# Patient Record
Sex: MALE | Race: WHITE | NOT HISPANIC OR LATINO | Employment: OTHER | URBAN - NONMETROPOLITAN AREA
[De-identification: names, ages, dates, MRNs, and addresses within clinical notes are randomized per-mention and may not be internally consistent; named-entity substitution may affect disease eponyms.]

---

## 2017-03-06 RX ORDER — DOXYCYCLINE 100 MG/1
100 CAPSULE ORAL 2 TIMES DAILY
Qty: 20 CAPSULE | Refills: 0 | Status: SHIPPED | OUTPATIENT
Start: 2017-03-06 | End: 2017-05-22

## 2017-03-06 RX ORDER — DOXYCYCLINE 100 MG/1
100 CAPSULE ORAL 2 TIMES DAILY
Qty: 20 CAPSULE | Refills: 0 | Status: SHIPPED | OUTPATIENT
Start: 2017-03-06 | End: 2017-03-06 | Stop reason: SDUPTHER

## 2017-05-05 RX ORDER — LOVASTATIN 20 MG/1
20 TABLET ORAL NIGHTLY
Qty: 30 TABLET | Refills: 3 | Status: SHIPPED | OUTPATIENT
Start: 2017-05-05 | End: 2017-05-05 | Stop reason: SDUPTHER

## 2017-05-05 RX ORDER — LOVASTATIN 20 MG/1
20 TABLET ORAL NIGHTLY
Qty: 30 TABLET | Refills: 0 | Status: SHIPPED | OUTPATIENT
Start: 2017-05-05 | End: 2017-06-20

## 2017-05-22 ENCOUNTER — OFFICE VISIT (OUTPATIENT)
Dept: FAMILY MEDICINE CLINIC | Facility: CLINIC | Age: 57
End: 2017-05-22

## 2017-05-22 ENCOUNTER — TELEPHONE (OUTPATIENT)
Dept: FAMILY MEDICINE CLINIC | Facility: CLINIC | Age: 57
End: 2017-05-22

## 2017-05-22 VITALS
HEART RATE: 78 BPM | BODY MASS INDEX: 29.73 KG/M2 | SYSTOLIC BLOOD PRESSURE: 152 MMHG | HEIGHT: 66 IN | OXYGEN SATURATION: 97 % | DIASTOLIC BLOOD PRESSURE: 70 MMHG | WEIGHT: 185 LBS | RESPIRATION RATE: 16 BRPM | TEMPERATURE: 98.4 F

## 2017-05-22 DIAGNOSIS — E03.9 ACQUIRED HYPOTHYROIDISM: ICD-10-CM

## 2017-05-22 DIAGNOSIS — G60.0 CHARCOT-MARIE-TOOTH DISEASE: ICD-10-CM

## 2017-05-22 DIAGNOSIS — E78.00 HYPERCHOLESTEROLEMIA: ICD-10-CM

## 2017-05-22 DIAGNOSIS — R91.1 SOLITARY PULMONARY NODULE ON LUNG CT: Primary | ICD-10-CM

## 2017-05-22 PROCEDURE — 99396 PREV VISIT EST AGE 40-64: CPT | Performed by: INTERNAL MEDICINE

## 2017-05-22 PROCEDURE — 99214 OFFICE O/P EST MOD 30 MIN: CPT | Performed by: INTERNAL MEDICINE

## 2017-05-22 PROCEDURE — 96160 PT-FOCUSED HLTH RISK ASSMT: CPT | Performed by: INTERNAL MEDICINE

## 2017-05-22 PROCEDURE — G0439 PPPS, SUBSEQ VISIT: HCPCS | Performed by: INTERNAL MEDICINE

## 2017-05-23 DIAGNOSIS — E87.5 HYPERKALEMIA: Primary | ICD-10-CM

## 2017-05-23 LAB
ALBUMIN SERPL-MCNC: 4 G/DL (ref 3.5–5)
ALBUMIN/GLOB SERPL: 1.7 G/DL (ref 1–2)
ALP SERPL-CCNC: 121 U/L (ref 38–126)
ALT SERPL-CCNC: 57 U/L (ref 13–69)
AST SERPL-CCNC: 56 U/L (ref 15–46)
BASOPHILS # BLD AUTO: 0.07 10*3/MM3 (ref 0–0.2)
BASOPHILS NFR BLD AUTO: 1.1 % (ref 0–2.5)
BILIRUB SERPL-MCNC: 0.3 MG/DL (ref 0.2–1.3)
BUN SERPL-MCNC: 27 MG/DL (ref 7–20)
BUN/CREAT SERPL: 22.5 (ref 6.3–21.9)
CALCIUM SERPL-MCNC: 9.5 MG/DL (ref 8.4–10.2)
CHLORIDE SERPL-SCNC: 104 MMOL/L (ref 98–107)
CHOLEST SERPL-MCNC: 168 MG/DL (ref 0–199)
CO2 SERPL-SCNC: 25 MMOL/L (ref 26–30)
CREAT SERPL-MCNC: 1.2 MG/DL (ref 0.6–1.3)
EOSINOPHIL # BLD AUTO: 0.24 10*3/MM3 (ref 0–0.7)
EOSINOPHIL NFR BLD AUTO: 3.6 % (ref 0–7)
ERYTHROCYTE [DISTWIDTH] IN BLOOD BY AUTOMATED COUNT: 13.2 % (ref 11.5–14.5)
GLOBULIN SER CALC-MCNC: 2.4 GM/DL
GLUCOSE SERPL-MCNC: 92 MG/DL (ref 74–98)
HCT VFR BLD AUTO: 45.6 % (ref 42–52)
HDLC SERPL-MCNC: 45 MG/DL (ref 40–60)
HGB BLD-MCNC: 15.1 G/DL (ref 14–18)
IMM GRANULOCYTES # BLD: 0.01 10*3/MM3 (ref 0–0.06)
IMM GRANULOCYTES NFR BLD: 0.2 % (ref 0–0.6)
LDLC SERPL CALC-MCNC: 79 MG/DL (ref 0–99)
LYMPHOCYTES # BLD AUTO: 2.2 10*3/MM3 (ref 0.6–3.4)
LYMPHOCYTES NFR BLD AUTO: 33.1 % (ref 10–50)
MCH RBC QN AUTO: 31.2 PG (ref 27–31)
MCHC RBC AUTO-ENTMCNC: 33.1 G/DL (ref 30–37)
MCV RBC AUTO: 94.2 FL (ref 80–94)
MONOCYTES # BLD AUTO: 0.68 10*3/MM3 (ref 0–0.9)
MONOCYTES NFR BLD AUTO: 10.2 % (ref 0–12)
NEUTROPHILS # BLD AUTO: 3.45 10*3/MM3 (ref 2–6.9)
NEUTROPHILS NFR BLD AUTO: 51.8 % (ref 37–80)
NRBC BLD AUTO-RTO: 0 /100 WBC (ref 0–0)
PLATELET # BLD AUTO: 207 10*3/MM3 (ref 130–400)
POTASSIUM SERPL-SCNC: 5.7 MMOL/L (ref 3.5–5.1)
PROT SERPL-MCNC: 6.4 G/DL (ref 6.3–8.2)
RBC # BLD AUTO: 4.84 10*6/MM3 (ref 4.7–6.1)
SODIUM SERPL-SCNC: 138 MMOL/L (ref 137–145)
T3FREE SERPL-MCNC: 2.7 PG/ML (ref 2–4.4)
T4 FREE SERPL-MCNC: 1.56 NG/DL (ref 0.78–2.19)
TRIGL SERPL-MCNC: 219 MG/DL
TSH SERPL DL<=0.005 MIU/L-ACNC: 1 MIU/ML (ref 0.47–4.68)
VIT B12 SERPL-MCNC: 757 PG/ML (ref 239–931)
VLDLC SERPL CALC-MCNC: 43.8 MG/DL
WBC # BLD AUTO: 6.65 10*3/MM3 (ref 4.8–10.8)

## 2017-06-20 ENCOUNTER — OFFICE VISIT (OUTPATIENT)
Dept: CARDIOLOGY | Facility: CLINIC | Age: 57
End: 2017-06-20

## 2017-06-20 VITALS
SYSTOLIC BLOOD PRESSURE: 114 MMHG | DIASTOLIC BLOOD PRESSURE: 66 MMHG | HEART RATE: 76 BPM | HEIGHT: 66 IN | BODY MASS INDEX: 29.89 KG/M2 | WEIGHT: 186 LBS

## 2017-06-20 DIAGNOSIS — Z95.0 PACEMAKER: ICD-10-CM

## 2017-06-20 PROCEDURE — 93288 INTERROG EVL PM/LDLS PM IP: CPT | Performed by: INTERNAL MEDICINE

## 2017-06-20 NOTE — PROGRESS NOTES
"              Electrophysiology PM Check           Current Outpatient Prescriptions:   •  aspirin 81 MG tablet, Take  by mouth daily., Disp: , Rfl:   •  Cholecalciferol (VITAMIN D-3) 1000 UNITS capsule, Take  by mouth., Disp: , Rfl:   •  citalopram (CeleXA) 20 MG tablet, Take 1 tablet by mouth Daily., Disp: 90 tablet, Rfl: 3  •  Docusate Calcium (STOOL SOFTENER PO), Take 100 mg by mouth 3 (Three) Times a Day., Disp: , Rfl:   •  fexofenadine (ALLEGRA) 180 MG tablet, Take 180 mg by mouth Daily., Disp: , Rfl:   •  ipratropium-albuterol (DUO-NEB) 0.5-2.5 mg/mL nebulizer, Take 3 mL by nebulization As Needed (as needed)., Disp: 360 mL, Rfl: 3  •  levothyroxine (LEVOXYL) 100 MCG tablet, Take 1 tablet by mouth Daily., Disp: 90 tablet, Rfl: 3  •  morphine (MS CONTIN) 60 MG 12 hr tablet, Take  by mouth 2 (Two) Times a Day., Disp: , Rfl:   •  omeprazole (priLOSEC) 20 MG capsule, Take 1 capsule by mouth Daily., Disp: 90 capsule, Rfl: 3  •  oxyCODONE (ROXICODONE) 10 MG tablet, Take  by mouth Daily As Needed., Disp: , Rfl:   •  promethazine (PHENERGAN) 25 MG tablet, Take  by mouth Daily., Disp: , Rfl:   •  tiZANidine (ZANAFLEX) 4 MG tablet, Take 1 tablet by mouth As Needed for muscle spasms., Disp: 30 tablet, Rfl: 5     /66 (BP Location: Right arm, Patient Position: Sitting)  Pulse 76  Ht 66\" (167.6 cm)  Wt 186 lb (84.4 kg)  BMI 30.02 kg/m2.    Physical Exam   Pulmonary/Chest:              Company SJM  Mode DDDR  Lower Rate 70 bpm  Upper rate 130 bpm       Thresholds  % pacing 89  Atrial Pacing 0.5 Volts @ 0.5 ms  Atrial Sensing 4.7 mV  Atrial Impedence 400 Ohms    % pacing 2.3  Right Ventricular Pacing 0.75 Volts @ 0.5 ms  Right Ventricular Sensing 712 mV  Right Ventricular Impedence 510 Ohms      Battery Voltage 2.93 Volts  Longevity 7.2-8.1        Episodes 0    Reprogramming 0                           Comments       NORMAL FUNCTION      "

## 2017-09-08 RX ORDER — LOVASTATIN 20 MG/1
20 TABLET ORAL NIGHTLY
Qty: 90 TABLET | Refills: 3 | Status: SHIPPED | OUTPATIENT
Start: 2017-09-08 | End: 2018-09-14 | Stop reason: SDUPTHER

## 2017-10-03 ENCOUNTER — CLINICAL SUPPORT NO REQUIREMENTS (OUTPATIENT)
Dept: CARDIOLOGY | Facility: CLINIC | Age: 57
End: 2017-10-03

## 2017-10-03 DIAGNOSIS — R00.1 BRADYCARDIA: ICD-10-CM

## 2017-10-03 PROCEDURE — 93294 REM INTERROG EVL PM/LDLS PM: CPT | Performed by: PHYSICIAN ASSISTANT

## 2017-10-03 PROCEDURE — 93296 REM INTERROG EVL PM/IDS: CPT | Performed by: PHYSICIAN ASSISTANT

## 2017-10-19 ENCOUNTER — HOSPITAL ENCOUNTER (OUTPATIENT)
Dept: CT IMAGING | Facility: HOSPITAL | Age: 57
Discharge: HOME | End: 2017-10-19
Attending: INTERNAL MEDICINE | Admitting: INTERNAL MEDICINE

## 2017-10-19 DIAGNOSIS — R91.1 SOLITARY PULMONARY NODULE ON LUNG CT: ICD-10-CM

## 2017-10-19 PROCEDURE — 71250 CT THORAX DX C-: CPT

## 2017-10-30 ENCOUNTER — OFFICE VISIT (OUTPATIENT)
Dept: FAMILY MEDICINE CLINIC | Facility: CLINIC | Age: 57
End: 2017-10-30

## 2017-10-30 VITALS
DIASTOLIC BLOOD PRESSURE: 80 MMHG | WEIGHT: 191.12 LBS | RESPIRATION RATE: 18 BRPM | OXYGEN SATURATION: 99 % | HEART RATE: 87 BPM | HEIGHT: 66 IN | SYSTOLIC BLOOD PRESSURE: 163 MMHG | TEMPERATURE: 98.5 F | BODY MASS INDEX: 30.71 KG/M2

## 2017-10-30 DIAGNOSIS — Z23 NEED FOR IMMUNIZATION AGAINST INFLUENZA: ICD-10-CM

## 2017-10-30 DIAGNOSIS — G47.33 OSA ON CPAP: ICD-10-CM

## 2017-10-30 DIAGNOSIS — E78.00 HYPERCHOLESTEROLEMIA: Primary | ICD-10-CM

## 2017-10-30 DIAGNOSIS — Z99.89 OSA ON CPAP: ICD-10-CM

## 2017-10-30 DIAGNOSIS — R91.1 SOLITARY PULMONARY NODULE ON LUNG CT: ICD-10-CM

## 2017-10-30 DIAGNOSIS — Z72.0 TOBACCO ABUSE: ICD-10-CM

## 2017-10-30 DIAGNOSIS — E03.9 ACQUIRED HYPOTHYROIDISM: ICD-10-CM

## 2017-10-30 PROCEDURE — 99213 OFFICE O/P EST LOW 20 MIN: CPT | Performed by: INTERNAL MEDICINE

## 2017-10-30 PROCEDURE — 90686 IIV4 VACC NO PRSV 0.5 ML IM: CPT | Performed by: INTERNAL MEDICINE

## 2017-10-30 PROCEDURE — 90471 IMMUNIZATION ADMIN: CPT | Performed by: INTERNAL MEDICINE

## 2017-10-30 RX ORDER — MORPHINE SULFATE 30 MG/1
30 TABLET, FILM COATED, EXTENDED RELEASE ORAL 2 TIMES DAILY
Refills: 0 | COMMUNITY
Start: 2017-10-23

## 2017-10-30 NOTE — PROGRESS NOTES
Subjective     Patient ID: Terry Mcguire is a 57 y.o. male. Patient is here for management of multiple medical problems.     Chief Complaint   Patient presents with   • Follow-up     Follow up on CT on chest      History of Present Illness     cpap waking pt ever hour.  Pt just waking up.  No smothering.   Increase sweating with c pap.  Nose stops up with cpap. Asking for options.        Recent ct.   Nodule reviewed. No nodule.              The following portions of the patient's history were reviewed and updated as appropriate: allergies, current medications, past family history, past medical history, past social history, past surgical history and problem list.    Review of Systems   Constitutional: Positive for fatigue.   Respiratory: Positive for shortness of breath. Negative for wheezing.    Psychiatric/Behavioral: Positive for sleep disturbance.       Current Outpatient Prescriptions:   •  aspirin 81 MG tablet, Take  by mouth daily., Disp: , Rfl:   •  citalopram (CeleXA) 20 MG tablet, Take 1 tablet by mouth Daily., Disp: 90 tablet, Rfl: 3  •  Docusate Calcium (STOOL SOFTENER PO), Take 100 mg by mouth 3 (Three) Times a Day., Disp: , Rfl:   •  fexofenadine (ALLEGRA) 180 MG tablet, Take 180 mg by mouth Daily., Disp: , Rfl:   •  ipratropium-albuterol (DUO-NEB) 0.5-2.5 mg/mL nebulizer, Take 3 mL by nebulization As Needed (as needed)., Disp: 360 mL, Rfl: 3  •  levothyroxine (LEVOXYL) 100 MCG tablet, Take 1 tablet by mouth Daily., Disp: 90 tablet, Rfl: 3  •  lovastatin (MEVACOR) 20 MG tablet, Take 1 tablet by mouth Every Night., Disp: 90 tablet, Rfl: 3  •  omeprazole (priLOSEC) 20 MG capsule, Take 1 capsule by mouth Daily., Disp: 90 capsule, Rfl: 3  •  oxyCODONE (ROXICODONE) 10 MG tablet, Take  by mouth Daily As Needed., Disp: , Rfl:   •  promethazine (PHENERGAN) 25 MG tablet, Take  by mouth Daily., Disp: , Rfl:   •  tiZANidine (ZANAFLEX) 4 MG tablet, Take 1 tablet by mouth As Needed for muscle spasms., Disp: 30  "tablet, Rfl: 5  •  Cholecalciferol (VITAMIN D-3) 1000 UNITS capsule, Take  by mouth., Disp: , Rfl:   •  Morphine (MS CONTIN) 30 MG 12 hr tablet, take 1 tablet by mouth three times a day, Disp: , Rfl: 0    Objective      Blood pressure 163/80, pulse 87, temperature 98.5 °F (36.9 °C), temperature source Oral, resp. rate 18, height 66\" (167.6 cm), weight 191 lb 1.9 oz (86.7 kg), SpO2 99 %.    Physical Exam     General Appearance:    Alert, cooperative, no distress, appears stated age   Head:    Normocephalic, without obvious abnormality, atraumatic   Eyes:    PERRL, conjunctiva/corneas clear, EOM's intact   Ears:    Normal TM's and external ear canals, both ears   Nose:   Nares normal, septum midline, mucosa normal, no drainage   or sinus tenderness   Throat:   Lips, mucosa, and tongue normal; teeth and gums normal   Neck:   Supple, symmetrical, trachea midline, no adenopathy;        thyroid:  No enlargement/tenderness/nodules; no carotid    bruit or JVD   Back:     Symmetric, no curvature, ROM normal, no CVA tenderness   Lungs:     Clear to auscultation bilaterally, respirations unlabored   Chest wall:    No tenderness or deformity   Heart:    Regular rate and rhythm, S1 and S2 normal, no murmur,        rub or gallop   Abdomen:     Soft, non-tender, bowel sounds active all four quadrants,     no masses, no organomegaly   Extremities:   Extremities normal, atraumatic, no cyanosis or edema   Pulses:   2+ and symmetric all extremities   Skin:   Skin color, texture, turgor normal, no rashes or lesions   Lymph nodes:   Cervical, supraclavicular, and axillary nodes normal   Neurologic:   CNII-XII intact. Normal strength, sensation and reflexes       throughout      Results for orders placed or performed in visit on 05/22/17   TSH   Result Value Ref Range    TSH 1.000 0.470 - 4.680 mIU/mL   T4, Free   Result Value Ref Range    Free T4 1.56 0.78 - 2.19 ng/dL   T3, Free   Result Value Ref Range    T3, Free 2.7 2.0 - 4.4 pg/mL "   Vitamin B12   Result Value Ref Range    Vitamin B-12 757 239 - 931 pg/mL   Comprehensive Metabolic Panel   Result Value Ref Range    Glucose 92 74 - 98 mg/dL    BUN 27 (H) 7 - 20 mg/dL    Creatinine 1.20 0.60 - 1.30 mg/dL    eGFR Non African Am 63 >60 mL/min/1.73    eGFR African Am 76 >60 mL/min/1.73    BUN/Creatinine Ratio 22.5 (H) 6.3 - 21.9    Sodium 138 137 - 145 mmol/L    Potassium 5.7 (C) 3.5 - 5.1 mmol/L    Chloride 104 98 - 107 mmol/L    Total CO2 25.0 (L) 26.0 - 30.0 mmol/L    Calcium 9.5 8.4 - 10.2 mg/dL    Total Protein 6.4 6.3 - 8.2 g/dL    Albumin 4.00 3.50 - 5.00 g/dL    Globulin 2.4 gm/dL    A/G Ratio 1.7 1.0 - 2.0 g/dL    Total Bilirubin 0.3 0.2 - 1.3 mg/dL    Alkaline Phosphatase 121 38 - 126 U/L    AST (SGOT) 56 (H) 15 - 46 U/L    ALT (SGPT) 57 13 - 69 U/L   Lipid Panel   Result Value Ref Range    Total Cholesterol 168 0 - 199 mg/dL    Triglycerides 219 (H) <150 mg/dL    HDL Cholesterol 45 40 - 60 mg/dL    VLDL Cholesterol 43.8 mg/dL    LDL Cholesterol  79 0 - 99 mg/dL   CBC & Differential   Result Value Ref Range    WBC 6.65 4.80 - 10.80 10*3/mm3    RBC 4.84 4.70 - 6.10 10*6/mm3    Hemoglobin 15.1 14.0 - 18.0 g/dL    Hematocrit 45.6 42.0 - 52.0 %    MCV 94.2 (H) 80.0 - 94.0 fL    MCH 31.2 (H) 27.0 - 31.0 pg    MCHC 33.1 30.0 - 37.0 g/dL    RDW 13.2 11.5 - 14.5 %    Platelets 207 130 - 400 10*3/mm3    Neutrophil Rel % 51.8 37.0 - 80.0 %    Lymphocyte Rel % 33.1 10.0 - 50.0 %    Monocyte Rel % 10.2 0.0 - 12.0 %    Eosinophil Rel % 3.6 0.0 - 7.0 %    Basophil Rel % 1.1 0.0 - 2.5 %    Neutrophils Absolute 3.45 2.00 - 6.90 10*3/mm3    Lymphocytes Absolute 2.20 0.60 - 3.40 10*3/mm3    Monocytes Absolute 0.68 0.00 - 0.90 10*3/mm3    Eosinophils Absolute 0.24 0.00 - 0.70 10*3/mm3    Basophils Absolute 0.07 0.00 - 0.20 10*3/mm3    Immature Granulocyte Rel % 0.2 0.0 - 0.6 %    Immature Grans Absolute 0.01 0.00 - 0.06 10*3/mm3    nRBC 0.0 0.0 - 0.0 /100 WBC         Assessment/Plan     Pt on oxycodone and  morphine.  Pt encouraged to use cpap every night given his pain meds and risk of hypoxia at night.    Ct neg of nodule.           Terry was seen today for follow-up.    Diagnoses and all orders for this visit:    Hypercholesterolemia  -     TSH  -     T4, Free  -     Vitamin B12  -     Comprehensive Metabolic Panel  -     CBC & Differential  -     Lipid Panel    Acquired hypothyroidism  -     TSH  -     T4, Free  -     Vitamin B12  -     Comprehensive Metabolic Panel  -     CBC & Differential  -     Lipid Panel    Tobacco abuse  -     TSH  -     T4, Free  -     Vitamin B12  -     Comprehensive Metabolic Panel  -     CBC & Differential  -     Lipid Panel    NETTIE on CPAP  -     TSH  -     T4, Free  -     Vitamin B12  -     Comprehensive Metabolic Panel  -     CBC & Differential  -     Lipid Panel    Solitary pulmonary nodule on lung CT      Return in about 6 months (around 4/30/2018).          There are no Patient Instructions on file for this visit.     Segundo Vo MD    Assessment/Plan           Terry was seen today for follow-up.    Diagnoses and all orders for this visit:    Hypercholesterolemia  -     TSH  -     T4, Free  -     Vitamin B12  -     Comprehensive Metabolic Panel  -     CBC & Differential  -     Lipid Panel    Acquired hypothyroidism  -     TSH  -     T4, Free  -     Vitamin B12  -     Comprehensive Metabolic Panel  -     CBC & Differential  -     Lipid Panel    Tobacco abuse  -     TSH  -     T4, Free  -     Vitamin B12  -     Comprehensive Metabolic Panel  -     CBC & Differential  -     Lipid Panel    NETTIE on CPAP  -     TSH  -     T4, Free  -     Vitamin B12  -     Comprehensive Metabolic Panel  -     CBC & Differential  -     Lipid Panel    Solitary pulmonary nodule on lung CT      Return in about 6 months (around 4/30/2018).                   There are no Patient Instructions on file for this visit.

## 2017-11-16 RX ORDER — OMEPRAZOLE 20 MG/1
CAPSULE, DELAYED RELEASE ORAL
Qty: 90 CAPSULE | Refills: 3 | Status: SHIPPED | OUTPATIENT
Start: 2017-11-16 | End: 2018-12-04 | Stop reason: SDUPTHER

## 2017-11-16 RX ORDER — LEVOTHYROXINE SODIUM 0.1 MG/1
TABLET ORAL
Qty: 90 TABLET | Refills: 3 | Status: SHIPPED | OUTPATIENT
Start: 2017-11-16 | End: 2018-12-04 | Stop reason: SDUPTHER

## 2017-11-16 RX ORDER — TIZANIDINE 4 MG/1
TABLET ORAL
Qty: 270 TABLET | Refills: 3 | Status: SHIPPED | OUTPATIENT
Start: 2017-11-16 | End: 2018-07-12

## 2018-02-01 RX ORDER — CITALOPRAM 20 MG/1
TABLET ORAL
Qty: 90 TABLET | Refills: 3 | Status: SHIPPED | OUTPATIENT
Start: 2018-02-01 | End: 2019-02-16 | Stop reason: SDUPTHER

## 2018-02-06 ENCOUNTER — CLINICAL SUPPORT NO REQUIREMENTS (OUTPATIENT)
Dept: CARDIOLOGY | Facility: CLINIC | Age: 58
End: 2018-02-06

## 2018-02-06 DIAGNOSIS — I63.9 CEREBROVASCULAR ACCIDENT (CVA), UNSPECIFIED MECHANISM (HCC): ICD-10-CM

## 2018-02-06 DIAGNOSIS — I49.5 SICK SINUS SYNDROME (HCC): Primary | ICD-10-CM

## 2018-03-08 ENCOUNTER — OFFICE VISIT (OUTPATIENT)
Dept: CARDIOLOGY | Facility: CLINIC | Age: 58
End: 2018-03-08

## 2018-03-08 VITALS
HEIGHT: 66 IN | HEART RATE: 49 BPM | WEIGHT: 201 LBS | BODY MASS INDEX: 32.3 KG/M2 | DIASTOLIC BLOOD PRESSURE: 52 MMHG | SYSTOLIC BLOOD PRESSURE: 106 MMHG

## 2018-03-08 DIAGNOSIS — I63.9 CEREBROVASCULAR ACCIDENT (CVA), UNSPECIFIED MECHANISM (HCC): ICD-10-CM

## 2018-03-08 DIAGNOSIS — E78.5 DYSLIPIDEMIA: ICD-10-CM

## 2018-03-08 DIAGNOSIS — G47.33 OSA ON CPAP: ICD-10-CM

## 2018-03-08 DIAGNOSIS — J43.1 PANLOBULAR EMPHYSEMA (HCC): ICD-10-CM

## 2018-03-08 DIAGNOSIS — R94.31 ABNORMAL ELECTROCARDIOGRAM: ICD-10-CM

## 2018-03-08 DIAGNOSIS — I49.5 SICK SINUS SYNDROME (HCC): Primary | ICD-10-CM

## 2018-03-08 DIAGNOSIS — Z72.0 TOBACCO ABUSE: ICD-10-CM

## 2018-03-08 DIAGNOSIS — I10 BENIGN HYPERTENSION: ICD-10-CM

## 2018-03-08 DIAGNOSIS — G60.0 HEREDITARY SENSORIMOTOR NEUROPATHY: ICD-10-CM

## 2018-03-08 DIAGNOSIS — Z99.89 OSA ON CPAP: ICD-10-CM

## 2018-03-08 DIAGNOSIS — R00.1 BRADYCARDIA: ICD-10-CM

## 2018-03-08 PROCEDURE — 99214 OFFICE O/P EST MOD 30 MIN: CPT | Performed by: INTERNAL MEDICINE

## 2018-03-08 PROCEDURE — 93280 PM DEVICE PROGR EVAL DUAL: CPT | Performed by: INTERNAL MEDICINE

## 2018-03-08 RX ORDER — GABAPENTIN 300 MG/1
300 CAPSULE ORAL 3 TIMES DAILY
COMMUNITY
End: 2018-07-10 | Stop reason: SDDI

## 2018-03-08 RX ORDER — BISOPROLOL FUMARATE 5 MG/1
2.5 TABLET, FILM COATED ORAL DAILY
Qty: 45 TABLET | Refills: 4 | Status: SHIPPED | OUTPATIENT
Start: 2018-03-08 | End: 2018-07-12

## 2018-03-08 NOTE — PROGRESS NOTES
Subjective:     Encounter Date:03/08/2018 - [place] Office    Patient ID: Terry Mcguire is a 57 y.o.  white male, retired/disabled , and resident of [place], [place].    PHYSICIAN:  Segundo Vo MD  [facility] CARDIOLOGIST:  Segundo Duran MD, [facility]  NEUROLOGIST:  Shukri Taylor MD  PAIN SPECIALIST:  [facility]    Chief Complaint:   Chief Complaint   Patient presents with   • Slow Heart Rate     Problem List:  1. Symptomatic bradycardia:  a. Status post [employer] pacemaker implantation in 2006.  b. Subsequent [employer] Bishop Accent DR/RF 2210 pacemaker implant, 05/22/2013  c. Abnormal interrogation with intermittent atrial tachycardias and nonsustained ventricular tachycardia, March 2018  2. Benign hypertension.  3. Hypercholesterolemia.  4.  Chest pain syndrome:  a. Abnormal Cardiolite, 09/26/2006, with inferior  wall defect.  b. Normal cardiac catheterization, 10/06/2006; normal EF.  5. Ongoing tobacco abuse with chronic obstructive pulmonary disease (1.5 PPD)  6. Obstructive sleep apnea, with CPAP.  7. Seasonal allergies/rhinitis.  8. Osteoarthritis with chronic pain issues.  9. Gastroesophageal reflux disease with hiatal hernia.  10. Hypothyroidism  on chronic supplementation.  11. Essential tremors.  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  13. Mild obesity, BMI 32.4  14. Family history of hypertension.  15. Surgical history:  a. Pacemaker implantation.  b. Left ankle surgery.  c. Multiple colonoscopies.  d. Herniorrhaphy.  e. Bilateral elbow surgery.  f. Bilateral carpal tunnel release.    No Known Allergies      Current Outpatient Prescriptions:   •  aspirin 81 MG tablet, Take  by mouth daily., Disp: , Rfl:   •  citalopram (CeleXA) 20 MG tablet, TAKE 1 TABLET BY MOUTH DAILY., Disp: 90 tablet, Rfl: 3  •  Docusate Calcium (STOOL SOFTENER PO), Take 100 mg by mouth 3 (Three) Times a Day., Disp: , Rfl:   •  fexofenadine (ALLEGRA) 180 MG tablet, Take  180 mg by mouth Daily., Disp: , Rfl:   •  gabapentin (NEURONTIN) 300 MG capsule, Take 300 mg by mouth 3 (Three) Times a Day., Disp: , Rfl:   •  levothyroxine (SYNTHROID, LEVOTHROID) 100 MCG tablet, TAKE 1 TABLET EVERY DAY, Disp: 90 tablet, Rfl: 3  •  lovastatin (MEVACOR) 20 MG tablet, Take 1 tablet by mouth Every Night., Disp: 90 tablet, Rfl: 3  •  Morphine (MS CONTIN) 30 MG 12 hr tablet, take 1 tablet by mouth three times a day, Disp: , Rfl: 0  •  omeprazole (priLOSEC) 20 MG capsule, TAKE 1 CAPSULE BY MOUTH DAILY., Disp: 90 capsule, Rfl: 3  •  oxyCODONE (ROXICODONE) 10 MG tablet, Take  by mouth Daily As Needed., Disp: , Rfl:   •  promethazine (PHENERGAN) 25 MG tablet, Take  by mouth Daily., Disp: , Rfl:   •  tiZANidine (ZANAFLEX) 4 MG tablet, TAKE 1 TABLET THREE TIMES DAILY AS NEEDED, Disp: 270 tablet, Rfl: 3    HISTORY OF PRESENT ILLNESS: Patient returns for followup after a 1-1/2 year hiatus.  He is a former patient of Dr. Ya. He has been retired since 2002 due to damaged nerves in his hands that prevented him from feeling anything.  He takes care of his dogs and does yard work and a little bit of house work.  He has shoveled snow this winter, but he does not do it all at one time.  He used to live in [place].  He does not have any chest pain, tightness, or pressure.  His lower back bothers him when he is out in the cold but not his chest.  He sleeps well at night as long as he uses his CPAP.  He has had lab work drawn, both at his physician's office and at his pain medicine specialist's office, and this has been good; data deficit.  He does complain of his feet and legs being red and almost purple when he gets up in the morning; he takes gabapentin for his leg pain.  He smokes on a daily basis, about a pack and a half per day.  He states that he and his wife have plans to quit.  The use of Chantix is discussed with him; he has never tried Chantix in the past.  Patient otherwise denies chest pain, shortness  "of breath, PND, edema, palpitations, syncope or presyncope at this time on limited activity.  No lower extremity claudication or focal motor-sensory changes.        Review of Systems   Cardiovascular: Positive for irregular heartbeat.   Endocrine:        When patient gets up in the morning, his legs are red, and his feet look purple.      Obtained and otherwise negative except as outlined in problem list and HPI.    Procedures [employer] [id number]  90% right atrial paced, 1.5% RV paced.  P wave 4.3, R wave >12.0.  Nominal atrial and ventricular threshold and impedance.  Battery voltage 2.93 volts.  Expected longevity 8 years.  14 episodes of atrial tachycardia with 1 nonsustained ventricular tachycardia consisting of 14 beats at a rate of 160/minute.  Reprogrammed with increased sensor threshold to -0.5, all manual tests completed.  Abnormal interrogation.       Objective:       Vitals:    03/08/18 1330 03/08/18 1331   BP: 102/58 106/52   BP Location: Right arm Right arm   Patient Position: Sitting Standing   Pulse: (!) 49    Weight: 91.2 kg (201 lb)    Height: 167.6 cm (66\")      Body mass index is 32.44 kg/(m^2).   Last weight:  187 lbs.    Physical Exam   Constitutional: He is oriented to person, place, and time. He appears well-developed and well-nourished.   Neck: No JVD present. Carotid bruit is not present. No thyromegaly present.   Cardiovascular: Regular rhythm, S1 normal and S2 normal.  Exam reveals no gallop, no S3 and no friction rub.    Murmur heard.   Medium-pitched early systolic murmur is present with a grade of 2/6  at the lower left sternal border  Pulses:       Carotid pulses are 1+ on the right side, and 1+ on the left side.       Radial pulses are 1+ on the right side, and 1+ on the left side.        Femoral pulses are 1+ on the right side, and 1+ on the left side.       Popliteal pulses are 1+ on the right side, and 1+ on the left side.        Dorsalis pedis pulses are 1+ on the right side, and 1+ " on the left side.        Posterior tibial pulses are 1+ on the right side, and 1+ on the left side.   Pulmonary/Chest: Effort normal. He has decreased breath sounds. He has no wheezes. He has no rhonchi. He has no rales.   Left precordial pacemaker site is nominal   Abdominal: Soft. He exhibits no mass. There is no hepatosplenomegaly. There is no tenderness. There is no guarding.   Bowel sounds audible x4   Musculoskeletal: Normal range of motion. He exhibits no edema.   Lymphadenopathy:     He has no cervical adenopathy.   Neurological: He is alert and oriented to person, place, and time.   Skin: Skin is warm, dry and intact. No rash noted.   Strong tobacco smell   Vitals reviewed.        Lab Review:   Lab Results   Component Value Date    BUN 27 (H) 05/22/2017    CREATININE 1.20 05/22/2017    EGFRIFNONA 63 05/22/2017    EGFRIFAFRI 76 05/22/2017    BCR 22.5 (H) 05/22/2017    CO2 25.0 (L) 05/22/2017    CALCIUM 9.5 05/22/2017    PROTENTOTREF 6.4 05/22/2017    ALBUMIN 4.00 05/22/2017    LABIL2 1.7 05/22/2017    AST 56 (H) 05/22/2017    ALT 57 05/22/2017       Lab Results   Component Value Date    WBC 6.65 05/22/2017    HGB 15.1 05/22/2017    HCT 45.6 05/22/2017    MCV 94.2 (H) 05/22/2017     05/22/2017       Lab Results   Component Value Date    TSH 1.000 05/22/2017     Lab Results   Component Value Date    TRIG 219 (H) 05/22/2017     Lab Results   Component Value Date    HDL 45 05/22/2017     Lab Results   Component Value Date    LDL 79 05/22/2017           Assessment:   Overall continued acceptable course with no interim cardiopulmonary complaints with acceptable functional status. We will defer additional diagnostic or therapeutic intervention from a cardiac perspective at this time other than to screen him for development of LV dysfunction and/or myocardial ischemia/scar with his continued tobacco use.  He states he will attempt to obtain and fax laboratory results to us.  I have had a long discussion with  him about the immediate and chronic need to not use tobacco.       Diagnosis Plan   1. Sick sinus syndrome  Stress Test With Myocardial Perfusion (1 Day)   2. Benign hypertension  Stress Test With Myocardial Perfusion (1 Day)   3. Bradycardia  Stress Test With Myocardial Perfusion (1 Day)   4. Abnormal electrocardiogram   Stress Test With Myocardial Perfusion (1 Day)   5. Cerebrovascular accident (CVA), unspecified mechanism  Continue current treatment   6. Panlobular emphysema  Tobacco cessation stressed   7. NETTIE on CPAP  Compliance strongly encouraged   8. Hereditary sensorimotor neuropathy  Continue followup and monitoring with Dr. Taylor   9. Tobacco abuse  Chantix prescribed   10. Dyslipidemia  No data to review          Plan:         1. Patient to continue current medications and close follow up with the above providers with the following alterations:   A. Bisoprolol 2.5 mg daily   B. Chantix   2. Tentative cardiology follow up in July 2018, or patient may return sooner PRN.  3. The patient will return in the next few weeks, as schedule tolerates, for IV Lexiscan Cardiolite study  4. 0-325 card is provided with fax number so patient can share his laboratory results.    Transcribed by Silvana David for Dr. Noam Foster at 8:12 AM on 03/08/2018      INoam MD, [facility], personally performed the services described in this documentation as scribed by the above named individual in my presence, and it is both accurate and complete. At 1:49 PM on 03/08/2018

## 2018-03-15 ENCOUNTER — TELEPHONE (OUTPATIENT)
Dept: CARDIOLOGY | Facility: CLINIC | Age: 58
End: 2018-03-15

## 2018-03-15 NOTE — TELEPHONE ENCOUNTER
Patient called inquiring where his refills were sent last week. I attempted to call him back to let him know they were sent to [facility]. There was no answer and he had no VM. I also forwarded his info to scheduling as he wondered when he would be scheduled for his stress test.

## 2018-03-20 RX ORDER — VARENICLINE TARTRATE 1 MG/1
1 TABLET, FILM COATED ORAL DAILY
Qty: 30 TABLET | Refills: 6 | Status: SHIPPED | OUTPATIENT
Start: 2018-03-20 | End: 2018-07-10 | Stop reason: SDDI

## 2018-03-26 ENCOUNTER — HOSPITAL ENCOUNTER (OUTPATIENT)
Dept: CARDIOLOGY | Facility: HOSPITAL | Age: 58
Discharge: HOME | End: 2018-03-26
Attending: INTERNAL MEDICINE

## 2018-03-26 VITALS
HEART RATE: 70 BPM | WEIGHT: 201 LBS | BODY MASS INDEX: 32.3 KG/M2 | DIASTOLIC BLOOD PRESSURE: 80 MMHG | SYSTOLIC BLOOD PRESSURE: 132 MMHG | HEIGHT: 66 IN

## 2018-03-26 DIAGNOSIS — I10 BENIGN HYPERTENSION: ICD-10-CM

## 2018-03-26 DIAGNOSIS — R00.1 BRADYCARDIA: ICD-10-CM

## 2018-03-26 DIAGNOSIS — R94.31 ABNORMAL ELECTROCARDIOGRAM: ICD-10-CM

## 2018-03-26 DIAGNOSIS — I49.5 SICK SINUS SYNDROME (HCC): ICD-10-CM

## 2018-03-26 LAB
BH CV NUCLEAR PRIOR STUDY: 3
BH CV STRESS BP STAGE 2: NORMAL
BH CV STRESS BP STAGE 3: NORMAL
BH CV STRESS COMMENTS STAGE 1: NORMAL
BH CV STRESS DOSE REGADENOSON STAGE 1: 0.4
BH CV STRESS DURATION MIN STAGE 1: 1
BH CV STRESS DURATION MIN STAGE 2: 1
BH CV STRESS DURATION MIN STAGE 3: 1
BH CV STRESS DURATION MIN STAGE 4: 1
BH CV STRESS DURATION SEC STAGE 2: 0
BH CV STRESS HR STAGE 1: 72
BH CV STRESS HR STAGE 2: 85
BH CV STRESS HR STAGE 3: 82
BH CV STRESS HR STAGE 4: 78
BH CV STRESS PROTOCOL 1: NORMAL
BH CV STRESS RECOVERY BP: NORMAL MMHG
BH CV STRESS RECOVERY HR: 75 BPM
BH CV STRESS STAGE 1: 1
BH CV STRESS STAGE 2: 2
BH CV STRESS STAGE 3: 3
BH CV STRESS STAGE 4: 4
LV EF NUC BP: 77 %
MAXIMAL PREDICTED HEART RATE: 163 BPM
PERCENT MAX PREDICTED HR: 52.76 %
STRESS BASELINE BP: NORMAL MMHG
STRESS BASELINE HR: 70 BPM
STRESS PERCENT HR: 62 %
STRESS POST EXERCISE DUR MIN: 4 MIN
STRESS POST EXERCISE DUR SEC: 0 SEC
STRESS POST PEAK BP: NORMAL MMHG
STRESS POST PEAK HR: 86 BPM
STRESS TARGET HR: 139 BPM

## 2018-03-26 PROCEDURE — 25010000002 AMINOPHYLLINE PER 250 MG: Performed by: INTERNAL MEDICINE

## 2018-03-26 PROCEDURE — 0 TECHNETIUM SESTAMIBI: Performed by: INTERNAL MEDICINE

## 2018-03-26 PROCEDURE — 78452 HT MUSCLE IMAGE SPECT MULT: CPT | Performed by: INTERNAL MEDICINE

## 2018-03-26 PROCEDURE — 78452 HT MUSCLE IMAGE SPECT MULT: CPT

## 2018-03-26 PROCEDURE — 93017 CV STRESS TEST TRACING ONLY: CPT

## 2018-03-26 PROCEDURE — 25010000002 REGADENOSON 0.4 MG/5ML SOLUTION: Performed by: INTERNAL MEDICINE

## 2018-03-26 PROCEDURE — A9500 TC99M SESTAMIBI: HCPCS | Performed by: INTERNAL MEDICINE

## 2018-03-26 PROCEDURE — 93018 CV STRESS TEST I&R ONLY: CPT | Performed by: INTERNAL MEDICINE

## 2018-03-26 RX ORDER — AMINOPHYLLINE DIHYDRATE 25 MG/ML
25 INJECTION, SOLUTION INTRAVENOUS ONCE
Status: COMPLETED | OUTPATIENT
Start: 2018-03-26 | End: 2018-03-26

## 2018-03-26 RX ADMIN — REGADENOSON 0.4 MG: 0.08 INJECTION, SOLUTION INTRAVENOUS at 11:58

## 2018-03-26 RX ADMIN — TECHNETIUM TC 99M SESTAMIBI 1 DOSE: 1 INJECTION INTRAVENOUS at 10:05

## 2018-03-26 RX ADMIN — TECHNETIUM TC 99M SESTAMIBI 1 DOSE: 1 INJECTION INTRAVENOUS at 12:05

## 2018-03-26 RX ADMIN — AMINOPHYLLINE 25 MG: 25 INJECTION, SOLUTION INTRAVENOUS at 12:28

## 2018-04-10 ENCOUNTER — OFFICE VISIT (OUTPATIENT)
Dept: INTERNAL MEDICINE | Facility: CLINIC | Age: 58
End: 2018-04-10

## 2018-04-10 VITALS
BODY MASS INDEX: 31.98 KG/M2 | SYSTOLIC BLOOD PRESSURE: 138 MMHG | DIASTOLIC BLOOD PRESSURE: 78 MMHG | HEIGHT: 66 IN | OXYGEN SATURATION: 99 % | HEART RATE: 70 BPM | RESPIRATION RATE: 16 BRPM | TEMPERATURE: 98.2 F | WEIGHT: 199 LBS

## 2018-04-10 DIAGNOSIS — M25.551 HIP PAIN, BILATERAL: ICD-10-CM

## 2018-04-10 DIAGNOSIS — E55.9 VITAMIN D DEFICIENCY: ICD-10-CM

## 2018-04-10 DIAGNOSIS — G89.29 CHRONIC BILATERAL LOW BACK PAIN WITHOUT SCIATICA: ICD-10-CM

## 2018-04-10 DIAGNOSIS — E03.9 ACQUIRED HYPOTHYROIDISM: ICD-10-CM

## 2018-04-10 DIAGNOSIS — E78.00 HYPERCHOLESTEROLEMIA: Primary | ICD-10-CM

## 2018-04-10 DIAGNOSIS — M54.50 CHRONIC BILATERAL LOW BACK PAIN WITHOUT SCIATICA: ICD-10-CM

## 2018-04-10 DIAGNOSIS — I10 BENIGN HYPERTENSION: ICD-10-CM

## 2018-04-10 DIAGNOSIS — M25.552 HIP PAIN, BILATERAL: ICD-10-CM

## 2018-04-10 PROCEDURE — 99214 OFFICE O/P EST MOD 30 MIN: CPT | Performed by: INTERNAL MEDICINE

## 2018-04-10 NOTE — PROGRESS NOTES
Subjective     Patient ID: Terry Mcguire is a 57 y.o. male. Patient is here for management of multiple medical problems.     Chief Complaint   Patient presents with   • Hyperlipidemia     6 month follow-up   • Pain     patient having pain in lower back and both hips x 2 weeks     History of Present Illness     Back and hip pain all the time for many years with acute worsening over the past 2 weeks. Woke with pain 2 weeks ago. No trauma no lifting.   Take pain meds from ptc.   Pain is getting some better. Pt can now stand.    Lying in bed helps.  No fever chills or ab pain. Eating peanuts now and now not constipated.  Both hips hurting.    Pt was ask to come in for regular visit today. He didn't get labs done yet.              The following portions of the patient's history were reviewed and updated as appropriate: allergies, current medications, past family history, past medical history, past social history, past surgical history and problem list.    Review of Systems   Constitutional: Negative for fatigue.   Musculoskeletal: Positive for arthralgias, back pain, gait problem and joint swelling.   Psychiatric/Behavioral: Positive for sleep disturbance.   All other systems reviewed and are negative.      Current Outpatient Prescriptions:   •  aspirin 81 MG tablet, Take  by mouth daily., Disp: , Rfl:   •  bisoprolol (ZEBeta) 5 MG tablet, Take 0.5 tablets by mouth Daily., Disp: 45 tablet, Rfl: 4  •  citalopram (CeleXA) 20 MG tablet, TAKE 1 TABLET BY MOUTH DAILY., Disp: 90 tablet, Rfl: 3  •  Docusate Calcium (STOOL SOFTENER PO), Take 100 mg by mouth 3 (Three) Times a Day., Disp: , Rfl:   •  fexofenadine (ALLEGRA) 180 MG tablet, Take 180 mg by mouth Daily., Disp: , Rfl:   •  gabapentin (NEURONTIN) 300 MG capsule, Take 300 mg by mouth 3 (Three) Times a Day., Disp: , Rfl:   •  levothyroxine (SYNTHROID, LEVOTHROID) 100 MCG tablet, TAKE 1 TABLET EVERY DAY, Disp: 90 tablet, Rfl: 3  •  lovastatin (MEVACOR) 20 MG tablet,  "Take 1 tablet by mouth Every Night., Disp: 90 tablet, Rfl: 3  •  Morphine (MS CONTIN) 30 MG 12 hr tablet, take 1 tablet by mouth two times a day, Disp: , Rfl: 0  •  omeprazole (priLOSEC) 20 MG capsule, TAKE 1 CAPSULE BY MOUTH DAILY., Disp: 90 capsule, Rfl: 3  •  oxyCODONE (ROXICODONE) 10 MG tablet, Take  by mouth 2 (Two) Times a Day., Disp: , Rfl:   •  promethazine (PHENERGAN) 25 MG tablet, Take  by mouth Daily., Disp: , Rfl:   •  tiZANidine (ZANAFLEX) 4 MG tablet, TAKE 1 TABLET THREE TIMES DAILY AS NEEDED, Disp: 270 tablet, Rfl: 3  •  varenicline (CHANTIX CONTINUING MONTH PAK) 1 MG tablet, Take 1 tablet by mouth Daily., Disp: 30 tablet, Rfl: 6  •  varenicline (CHANTIX STARTING MONTH PAK) 0.5 MG X 11 & 1 MG X 42 tablet, Take 0.5 mg one daily on days 1-3 and and 0.5 mg twice daily on days 4-7.Then 1 mg twice daily for a total of 12 weeks., Disp: 53 tablet, Rfl: 0  •  Tdap (BOOSTRIX) 5-2.5-18.5 LF-MCG/0.5 injection, Inject 0.5 mL into the shoulder, thigh, or buttocks 1 (One) Time for 1 dose., Disp: 0.5 mL, Rfl: 0    Objective      Blood pressure 138/78, pulse 70, temperature 98.2 °F (36.8 °C), temperature source Oral, resp. rate 16, height 167.6 cm (66\"), weight 90.3 kg (199 lb), SpO2 99 %.    Physical Exam     General Appearance:    Alert, cooperative, no distress, appears stated age   Head:    Normocephalic, without obvious abnormality, atraumatic   Eyes:    PERRL, conjunctiva/corneas clear, EOM's intact   Ears:    Normal TM's and external ear canals, both ears   Nose:   Nares normal, septum midline, mucosa normal, no drainage   or sinus tenderness   Throat:   Lips, mucosa, and tongue normal; teeth and gums normal   Neck:   Supple, symmetrical, trachea midline, no adenopathy;        thyroid:  No enlargement/tenderness/nodules; no carotid    bruit or JVD   Back:     Symmetric, no curvature, ROM normal, no CVA tenderness   Lungs:     Clear to auscultation bilaterally, respirations unlabored   Chest wall:    No " tenderness or deformity   Heart:    Regular rate and rhythm, S1 and S2 normal, no murmur,        rub or gallop   Abdomen:     Soft, non-tender, bowel sounds active all four quadrants,     no masses, no organomegaly   Extremities:   Pain in both hip with rom testing. No ttp of troch.  Neg slr.  Neg ttp of spine.     Pulses:   2+ and symmetric all extremities   Skin:   Skin color, texture, turgor normal, no rashes or lesions   Lymph nodes:   Cervical, supraclavicular, and axillary nodes normal   Neurologic:   CNII-XII intact. Normal strength, sensation and reflexes       throughout      Results for orders placed or performed during the hospital encounter of 03/26/18   Stress Test With Myocardial Perfusion (1 Day)   Result Value Ref Range    Target HR (85%) 139 bpm    Max. Pred. HR (100%) 163 bpm    BH CV STRESS PROTOCOL 1 Pharmacologic     Stage 1 1     HR Stage 1 72     Duration Min Stage 1 1     Stress Dose Regadenoson Stage 1 0.4     Stress Comments Stage 1 10 sec bolus injection     Stage 2 2     HR Stage 2 85     BP Stage 2 102/60     Duration Min Stage 2 1     Duration Sec Stage 2 0     Stage 3 3     HR Stage 3 82     BP Stage 3 120/60     Duration Min Stage 3 1     Stage 4 4     Duration Min Stage 4 1     Baseline HR 70 bpm    Baseline /80 mmHg    HR Stage 4 78     Peak HR 86 bpm    Percent Max Pred HR 52.76 %    Percent Target HR 62 %    Peak /80 mmHg    Recovery HR 75 bpm    Recovery /70 mmHg    Exercise duration (min) 4 min    Exercise duration (sec) 0 sec    Nuc Stress EF 77 %    Nuclear Prior Study 3          Assessment/Plan   Pt still smoking and not yet used chantix.  States he needs to do it.     Pt will stretch the psoas to see if that help.         Terry was seen today for hyperlipidemia and pain.    Diagnoses and all orders for this visit:    Hypercholesterolemia    Benign hypertension    Vitamin D deficiency    Acquired hypothyroidism    Hip pain, bilateral    Chronic bilateral  low back pain without sciatica  -     XR Spine Lumbar Complete With Flex & Ext; Future    Other orders  -     Tdap (BOOSTRIX) 5-2.5-18.5 LF-MCG/0.5 injection; Inject 0.5 mL into the shoulder, thigh, or buttocks 1 (One) Time for 1 dose.      Return in about 6 weeks (around 5/22/2018).          There are no Patient Instructions on file for this visit.     Segundo Vo MD    Assessment/Plan

## 2018-05-21 ENCOUNTER — TELEPHONE (OUTPATIENT)
Dept: INTERNAL MEDICINE | Facility: CLINIC | Age: 58
End: 2018-05-21

## 2018-05-31 ENCOUNTER — CLINICAL SUPPORT NO REQUIREMENTS (OUTPATIENT)
Dept: CARDIOLOGY | Facility: CLINIC | Age: 58
End: 2018-05-31

## 2018-05-31 DIAGNOSIS — I49.5 SICK SINUS SYNDROME (HCC): ICD-10-CM

## 2018-05-31 PROCEDURE — 93296 REM INTERROG EVL PM/IDS: CPT | Performed by: INTERNAL MEDICINE

## 2018-05-31 PROCEDURE — 93294 REM INTERROG EVL PM/LDLS PM: CPT | Performed by: INTERNAL MEDICINE

## 2018-07-10 ENCOUNTER — OFFICE VISIT (OUTPATIENT)
Dept: INTERNAL MEDICINE | Facility: CLINIC | Age: 58
End: 2018-07-10

## 2018-07-10 VITALS
RESPIRATION RATE: 16 BRPM | BODY MASS INDEX: 31.15 KG/M2 | SYSTOLIC BLOOD PRESSURE: 135 MMHG | OXYGEN SATURATION: 94 % | WEIGHT: 193 LBS | DIASTOLIC BLOOD PRESSURE: 68 MMHG | TEMPERATURE: 98 F | HEART RATE: 94 BPM

## 2018-07-10 DIAGNOSIS — Z00.00 ROUTINE GENERAL MEDICAL EXAMINATION AT A HEALTH CARE FACILITY: ICD-10-CM

## 2018-07-10 DIAGNOSIS — E03.9 ACQUIRED HYPOTHYROIDISM: ICD-10-CM

## 2018-07-10 DIAGNOSIS — I73.9 PVD (PERIPHERAL VASCULAR DISEASE) (HCC): Primary | ICD-10-CM

## 2018-07-10 PROCEDURE — 99396 PREV VISIT EST AGE 40-64: CPT | Performed by: INTERNAL MEDICINE

## 2018-07-10 PROCEDURE — 96160 PT-FOCUSED HLTH RISK ASSMT: CPT | Performed by: INTERNAL MEDICINE

## 2018-07-10 PROCEDURE — 99214 OFFICE O/P EST MOD 30 MIN: CPT | Performed by: INTERNAL MEDICINE

## 2018-07-10 PROCEDURE — G0438 PPPS, INITIAL VISIT: HCPCS | Performed by: INTERNAL MEDICINE

## 2018-07-10 NOTE — PROGRESS NOTES
QUICK REFERENCE INFORMATION:  The ABCs of the Annual Wellness Visit    Initial Medicare Wellness Visit    HEALTH RISK ASSESSMENT    1960    Recent Hospitalizations:  No hospitalization(s) within the last year..    Pain Scale: 4/5 daily.      Current Medical Providers:  Patient Care Team:  Segundo Vo MD as PCP - General        Smoking Status:  History   Smoking Status   • Current Every Day Smoker   • Packs/day: 1.50   • Types: Cigarettes   Smokeless Tobacco   • Never Used       Alcohol Consumption:  History   Alcohol Use   • Yes     Comment: once a year       Depression Screen:   PHQ-2/PHQ-9 Depression Screening 7/10/2018   Little interest or pleasure in doing things 0   Feeling down, depressed, or hopeless 0   Trouble falling or staying asleep, or sleeping too much -   Feeling tired or having little energy -   Poor appetite or overeating -   Feeling bad about yourself - or that you are a failure or have let yourself or your family down -   Trouble concentrating on things, such as reading the newspaper or watching television -   Moving or speaking so slowly that other people could have noticed. Or the opposite - being so fidgety or restless that you have been moving around a lot more than usual -   Thoughts that you would be better off dead, or of hurting yourself in some way -   Total Score 0   If you checked off any problems, how difficult have these problems made it for you to do your work, take care of things at home, or get along with other people? -       Health Habits and Functional and Cognitive Screening:  Functional & Cognitive Status 7/10/2018   Do you have difficulty preparing food and eating? No   Do you have difficulty bathing yourself, getting dressed or grooming yourself? No   Do you have difficulty using the toilet? No   Do you have difficulty moving around from place to place? No   Do you have trouble with steps or getting out of a bed or a chair? No   In the past year have you fallen or  experienced a near fall? No   Current Diet Well Balanced Diet   Dental Exam Up to date   Eye Exam Up to date   Exercise (times per week) 0 times per week   Current Exercise Activities Include None   Do you need help using the phone?  No   Are you deaf or do you have serious difficulty hearing?  No   Do you need help with transportation? No   Do you need help shopping? No   Do you need help preparing meals?  No   Do you need help with housework?  No   Do you need help with laundry? No   Do you need help taking your medications? No   Do you need help managing money? No   Do you ever drive or ride in a car without wearing a seat belt? No   Have you felt unusual stress, anger or loneliness in the last month? No   Who do you live with? Spouse   If you need help, do you have trouble finding someone available to you? No   Have you been bothered in the last four weeks by sexual problems? No   Do you have difficulty concentrating, remembering or making decisions? No           Does the patient have evidence of cognitive impairment? No    Asiprin use counseling: Taking ASA appropriately as indicated      Recent Lab Results:    Visual Acuity:  No exam data present    Age-appropriate Screening Schedule:  Refer to the list below for future screening recommendations based on patient's age, sex and/or medical conditions. Orders for these recommended tests are listed in the plan section. The patient has been provided with a written plan.    Health Maintenance   Topic Date Due   • TDAP/TD VACCINES (1 - Tdap) 06/04/1979   • ZOSTER VACCINE (1 of 2) 06/04/2010   • LIPID PANEL  05/22/2018   • INFLUENZA VACCINE  08/01/2018   • COLONOSCOPY  10/12/2026   • PNEUMOCOCCAL VACCINE (19-64 MEDIUM RISK)  Completed        Subjective   History of Present Illness    Terry Mcguire is a 58 y.o. male who presents for an Annual Wellness Visit.    The following portions of the patient's history were reviewed and updated as appropriate: allergies,  current medications, past family history, past medical history, past social history, past surgical history and problem list.    Outpatient Medications Prior to Visit   Medication Sig Dispense Refill   • aspirin 81 MG tablet Take  by mouth daily.     • bisoprolol (ZEBeta) 5 MG tablet Take 0.5 tablets by mouth Daily. 45 tablet 4   • citalopram (CeleXA) 20 MG tablet TAKE 1 TABLET BY MOUTH DAILY. 90 tablet 3   • Docusate Calcium (STOOL SOFTENER PO) Take 100 mg by mouth 3 (Three) Times a Day.     • fexofenadine (ALLEGRA) 180 MG tablet Take 180 mg by mouth Daily.     • levothyroxine (SYNTHROID, LEVOTHROID) 100 MCG tablet TAKE 1 TABLET EVERY DAY 90 tablet 3   • lovastatin (MEVACOR) 20 MG tablet Take 1 tablet by mouth Every Night. 90 tablet 3   • Morphine (MS CONTIN) 30 MG 12 hr tablet take 1 tablet by mouth two times a day  0   • omeprazole (priLOSEC) 20 MG capsule TAKE 1 CAPSULE BY MOUTH DAILY. 90 capsule 3   • oxyCODONE (ROXICODONE) 10 MG tablet Take  by mouth 2 (Two) Times a Day.     • promethazine (PHENERGAN) 25 MG tablet Take  by mouth Daily.     • tiZANidine (ZANAFLEX) 4 MG tablet TAKE 1 TABLET THREE TIMES DAILY AS NEEDED 270 tablet 3   • gabapentin (NEURONTIN) 300 MG capsule Take 300 mg by mouth 3 (Three) Times a Day.     • varenicline (CHANTIX CONTINUING MONTH SOUMYA) 1 MG tablet Take 1 tablet by mouth Daily. 30 tablet 6   • varenicline (CHANTIX STARTING MONTH SOUMYA) 0.5 MG X 11 & 1 MG X 42 tablet Take 0.5 mg one daily on days 1-3 and and 0.5 mg twice daily on days 4-7.Then 1 mg twice daily for a total of 12 weeks. 53 tablet 0     No facility-administered medications prior to visit.        Patient Active Problem List   Diagnosis   • Chronic obstructive pulmonary disease (CMS/HCC)   • Eczema   • Hypercholesterolemia   • Hyperthyroidism   • Hypothyroidism   • Nausea   • NETTIE on CPAP   • Hereditary sensorimotor neuropathy   • Cerebrovascular accident (CMS/HCC)   • Vitamin D deficiency   • Benign hypertension   • Chest pain  syndrome   • Tobacco abuse   • Seasonal allergic rhinitis   • Osteoarthritis   • Gastroesophageal reflux disease with hiatal hernia   • Essential tremor   • Charcot-Jolene-Tooth disease   • Obesity   • Sick sinus syndrome (CMS/HCC)   • Skin lesion of chest wall   • Dyslipidemia   • Abnormal electrocardiogram        Advance Care Planning:  has NO advance directive - not interested in additional information    Identification of Risk Factors:  Risk factors include: weight , unhealthy diet, increased fall risk and polypharmacy.    Review of Systems    Compared to one year ago, the patient feels his physical health is worse.  Compared to one year ago, the patient feels his mental health is the same.    Objective     Physical Exam    Vitals:    07/10/18 0806   BP: 135/68   Pulse: 94   Resp: 16   Temp: 98 °F (36.7 °C)   TempSrc: Oral   SpO2: 94%   Weight: 87.5 kg (193 lb)       Patient's Body mass index is 31.15 kg/m². BMI is above normal parameters. Recommendations include: no follow-up required.      Assessment/Plan   Patient Self-Management and Personalized Health Advice  The patient has been provided with information about: diet, exercise and fall prevention and preventive services including:   · Advance directive, Fall Risk assessment done, Fall Risk plan of care done.    Visit Diagnoses:    ICD-10-CM ICD-9-CM   1. PVD (peripheral vascular disease) (CMS/HCC) I73.9 443.9   2. Acquired hypothyroidism E03.9 244.9       Orders Placed This Encounter   Procedures   • TSH   • T4, Free   • Comprehensive Metabolic Panel       Outpatient Encounter Prescriptions as of 7/10/2018   Medication Sig Dispense Refill   • aspirin 81 MG tablet Take  by mouth daily.     • bisoprolol (ZEBeta) 5 MG tablet Take 0.5 tablets by mouth Daily. 45 tablet 4   • citalopram (CeleXA) 20 MG tablet TAKE 1 TABLET BY MOUTH DAILY. 90 tablet 3   • Docusate Calcium (STOOL SOFTENER PO) Take 100 mg by mouth 3 (Three) Times a Day.     • fexofenadine (ALLEGRA)  180 MG tablet Take 180 mg by mouth Daily.     • levothyroxine (SYNTHROID, LEVOTHROID) 100 MCG tablet TAKE 1 TABLET EVERY DAY 90 tablet 3   • lovastatin (MEVACOR) 20 MG tablet Take 1 tablet by mouth Every Night. 90 tablet 3   • Morphine (MS CONTIN) 30 MG 12 hr tablet take 1 tablet by mouth two times a day  0   • omeprazole (priLOSEC) 20 MG capsule TAKE 1 CAPSULE BY MOUTH DAILY. 90 capsule 3   • oxyCODONE (ROXICODONE) 10 MG tablet Take  by mouth 2 (Two) Times a Day.     • promethazine (PHENERGAN) 25 MG tablet Take  by mouth Daily.     • tiZANidine (ZANAFLEX) 4 MG tablet TAKE 1 TABLET THREE TIMES DAILY AS NEEDED 270 tablet 3   • [DISCONTINUED] gabapentin (NEURONTIN) 300 MG capsule Take 300 mg by mouth 3 (Three) Times a Day.     • [DISCONTINUED] varenicline (CHANTIX CONTINUING MONTH SOUMYA) 1 MG tablet Take 1 tablet by mouth Daily. 30 tablet 6   • [DISCONTINUED] varenicline (CHANTIX STARTING MONTH SOUMYA) 0.5 MG X 11 & 1 MG X 42 tablet Take 0.5 mg one daily on days 1-3 and and 0.5 mg twice daily on days 4-7.Then 1 mg twice daily for a total of 12 weeks. 53 tablet 0     No facility-administered encounter medications on file as of 7/10/2018.        Reviewed use of high risk medication in the elderly: yes  Reviewed for potential of harmful drug interactions in the elderly: yes    Follow Up:  Return in about 3 months (around 10/10/2018).     An After Visit Summary and PPPS with all of these plans were given to the patient.

## 2018-07-10 NOTE — PROGRESS NOTES
Subjective:     Encounter Date:07/12/2018 - [place] [facility]    Patient ID: Terry Mcguire is a 58 y.o.  white male, retired/disabled , and resident of [place], [place].     INTERNIST:  Segundo Vo MD  [facility] CARDIOLOGIST:  Segundo Duran MD, [facility]  NEUROLOGIST:  Shukri Taylor MD  PAIN SPECIALIST:  [facility]    Chief Complaint:   Chief Complaint   Patient presents with   • sick sinus syndrome     Problem List:  1. Symptomatic bradycardia:  a. Status post [employer] pacemaker implantation in 2006.  b. Subsequent [employer] Accent DR/RF 2210 pacemaker implant, 05/22/2013  c. Abnormal interrogation with intermittent atrial tachycardias and nonsustained ventricular tachycardia, March 2018, with normal nuclear stress test  2. Benign hypertension.  3. Hypercholesterolemia.  4.  Chest pain syndrome:  a. Abnormal Cardiolite, 09/26/2006, with inferior  wall defect.  b. Normal cardiac catheterization, 10/06/2006; normal EF.  c. Acceptable negative IV Lexiscan Cardiolite stress study suggestive of low probability for significant focal obstructive coronary artery disease with preserved systolic left ventricular function (LVEF 0.77).  5. Ongoing tobacco abuse with chronic obstructive pulmonary disease (1.5 PPD)  6. Obstructive sleep apnea, with CPAP.  7. Seasonal allergies/rhinitis.  8. Osteoarthritis with chronic pain issues.  9. Gastroesophageal reflux disease with hiatal hernia.  10. Hypothyroidism  on chronic supplementation.  11. Essential tremors.  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  13. Mild obesity, BMI 30.7  14. Family history of hypertension.  15. Surgical history:  a. Pacemaker implantation.  b. Left ankle surgery.  c. Multiple colonoscopies.  d. Herniorrhaphy.  e. Bilateral elbow surgery.  f. Bilateral carpal tunnel release.     No Known Allergies    Current Outpatient Prescriptions:   •  aspirin 81 MG tablet, Take  by mouth daily., Disp:  ", Rfl:   •  citalopram (CeleXA) 20 MG tablet, TAKE 1 TABLET BY MOUTH DAILY., Disp: 90 tablet, Rfl: 3  •  Docusate Calcium (STOOL SOFTENER PO), Take 100 mg by mouth 3 (Three) Times a Day., Disp: , Rfl:   •  fexofenadine (ALLEGRA) 180 MG tablet, Take 180 mg by mouth Daily., Disp: , Rfl:   •  levothyroxine (SYNTHROID, LEVOTHROID) 100 MCG tablet, TAKE 1 TABLET EVERY DAY, Disp: 90 tablet, Rfl: 3  •  lovastatin (MEVACOR) 20 MG tablet, Take 1 tablet by mouth Every Night., Disp: 90 tablet, Rfl: 3  •  Morphine (MS CONTIN) 30 MG 12 hr tablet, take 1 tablet by mouth two times a day, Disp: , Rfl: 0  •  omeprazole (priLOSEC) 20 MG capsule, TAKE 1 CAPSULE BY MOUTH DAILY., Disp: 90 capsule, Rfl: 3  •  oxyCODONE (ROXICODONE) 10 MG tablet, Take  by mouth 2 (Two) Times a Day., Disp: , Rfl:     History of Present Illness: Patient returns for scheduled 4-month followup. He says he has not made any progress on quitting smoking; he notes his wife smokes as much as he does, and she has been too stressed out recently to try to quit.  He did not try Chantix in the past because \"that's when everything all started going to hell.\"  They had to put a dog to sleep, his wife's sister , and his wife's son moved in with them and drinks a lot.  He asks about having a scan on his legs to check out his arteries \"because of my legs changing colors.\"  He notes that when he gets up in the morning, \"my legs are red, and my feet are purple.\" He states that his blood pressure has occasionally been dropping down into the 90s, so he quit taking the bisoprolol.  He notes that Dr. Vo ordered lab work and an NAYLA for him, but those results are not in his chart.  He says that he had an x-ray on his back, but he \"had to  the report and the disk from them and take it to him because they didn't send it\"; however, Dr. Vo was not able to read the disk due to the program required.  He walks for exercise and mows and \"cleans up the yard from the dogs,\" " "but he has to take pain medicine afterwards because it hurts so much.  He has no symptoms from a cardiopulmonary perspective with these activities.        Review of Systems   Eyes: Positive for blurred vision.   Cardiovascular: Positive for irregular heartbeat.   Respiratory: Positive for sleep disturbances due to breathing and snoring.    Hematologic/Lymphatic: Bruises/bleeds easily.   Skin: Positive for itching.   Musculoskeletal: Positive for arthritis, back pain, muscle cramps and muscle weakness.      Obtained and negative except as outlined in problem list and HPI.    Procedures       Objective:       Vitals:    07/12/18 1409 07/12/18 1410   BP: 122/75 105/70   BP Location: Right arm Right arm   Patient Position: Sitting Standing   Pulse: 90 89   Weight: 86.2 kg (190 lb)    Height: 167.6 cm (66\")      Body mass index is 30.67 kg/m².   Last weight:  201 lbs.    Physical Exam   Constitutional: He is oriented to person, place, and time. He appears well-developed and well-nourished.   Neck: No JVD present. Carotid bruit is not present. No thyromegaly present.   Cardiovascular: Regular rhythm, S1 normal and S2 normal.  Exam reveals no gallop, no S3 and no friction rub.    Murmur heard.   Medium-pitched early systolic murmur is present with a grade of 2/6  at the lower left sternal border  Pulses:       Carotid pulses are 1+ on the right side, and 1+ on the left side.       Radial pulses are 1+ on the right side, and 1+ on the left side.        Femoral pulses are 1+ on the right side, and 1+ on the left side.       Popliteal pulses are 1+ on the right side, and 1+ on the left side.        Dorsalis pedis pulses are 1+ on the right side, and 1+ on the left side.        Posterior tibial pulses are 1+ on the right side, and 1+ on the left side.   Pulmonary/Chest: Effort normal and breath sounds normal. He has no wheezes. He has no rhonchi. He has no rales.   Left precordial pacemaker site nominal   Abdominal: Soft. He " exhibits no mass. There is no hepatosplenomegaly. There is no tenderness. There is no guarding.   Bowel sounds audible x4   Musculoskeletal: Normal range of motion. He exhibits no edema.   Lymphadenopathy:     He has no cervical adenopathy.   Neurological: He is alert and oriented to person, place, and time.   Skin: Skin is warm, dry and intact. No rash noted.   Marked tobacco smell   Vitals reviewed.        Lab Review:   Lab Results   Component Value Date    BUN 27 (H) 05/22/2017    CREATININE 1.20 05/22/2017    EGFRIFNONA 63 05/22/2017    EGFRIFAFRI 76 05/22/2017    BCR 22.5 (H) 05/22/2017    CO2 25.0 (L) 05/22/2017    CALCIUM 9.5 05/22/2017    PROTENTOTREF 6.4 05/22/2017    ALBUMIN 4.00 05/22/2017    LABIL2 1.7 05/22/2017    AST 56 (H) 05/22/2017    ALT 57 05/22/2017       Lab Results   Component Value Date    WBC 6.65 05/22/2017    HGB 15.1 05/22/2017    HCT 45.6 05/22/2017    MCV 94.2 (H) 05/22/2017     05/22/2017       Lab Results   Component Value Date    TSH 1.000 05/22/2017       Lab Results   Component Value Date    TRIG 219 (H) 05/22/2017     Lab Results   Component Value Date    HDL 45 05/22/2017     IV Lexiscan Cardiolite stress study, March 2018:  · Patient denied chest discomfort.  · No significant ST or T wave changes noted.  · Left ventricular ejection fraction is hyperdynamic (Calculated EF > 70%) TID = 1.02.  · Diaphragmatic attenuation and GI artifacts are present; no marked qualitative epicardial coronary artery calcification noted on review of thoracic CT scan images.  · Raw images reviewed with the following abnormalities noted: mild movement at rest and post stress.  · Myocardial perfusion imaging indicates a normal myocardial perfusion study with no evidence of ischemia.  · Impressions are consistent with a low risk study.  · Findings consistent with an acceptable normal ECG stress test.  · Acceptable negative IV Lexiscan Cardiolite stress study suggestive of low probability for  significant focal obstructive coronary artery disease with preserved systolic left ventricular function (LVEF 0.77).      Assessment:       Overall continued acceptable course with no interim cardiopulmonary complaints with marginal but stable functional status. We will defer additional diagnostic or therapeutic intervention from a cardiac perspective at this time.  Hopefully, the results of his NAYLA +/- bilateral lower extremity duplex study will be shared with us; Dr. Vo has ordered  this to be performed at [facility].     Diagnosis Plan   1. Sick sinus syndrome (CMS/HCC)  Continue current treatment   2. Panlobular emphysema (CMS/HCC)  Strongly encouraged to discontinue tobacco use   3. NETTIE on CPAP  Compliance stressed   4. Tobacco abuse  I advised the patient of the risks of continuing to use tobacco, and I provided this patient with smoking cessation educational materials and discussed how to quit smoking and patient has not expressed the willingness to quit.  Counseled patient for 3-5 minutes     5. Dyslipidemia  No data to review          Plan:         1. Patient to continue current medications and close follow up with the above providers.  2. Tentative cardiology follow up in January 2019 with pacemaker check, or patient may return sooner PRN.    Transcribed by Silvana David for Dr. Noam Foster at 8:48 AM on 07/12/2018    I, Noam Foster MD, [facility], personally performed the services described in this documentation as scribed by the above named individual in my presence, and it is both accurate and complete. At 2:34 PM on 07/12/2018

## 2018-07-10 NOTE — PROGRESS NOTES
Subjective     Patient ID: Terry Mcguire is a 58 y.o. male. Patient is here for management of multiple medical problems.     Chief Complaint   Patient presents with   • Hyperlipidemia   • Back Pain     Follow up. Patient sees pain clinic.      History of Present Illness       Episodes of low bp sbp in the 80's 5-6 times a week.    Recent stress test.  Dr Foster.   Pt concerned of leg PVD.   Low bp started prior to starting bisoprolol.  No syncope.    Doesn't drink water.  Drinks a lot of diet Mt dew and milk.        The following portions of the patient's history were reviewed and updated as appropriate: allergies, current medications, past family history, past medical history, past social history, past surgical history and problem list.    Review of Systems   Constitutional: Negative for fatigue.   Respiratory: Negative for cough and shortness of breath.    Musculoskeletal: Positive for arthralgias, gait problem and joint swelling.   Psychiatric/Behavioral: Negative for sleep disturbance.   All other systems reviewed and are negative.      Current Outpatient Prescriptions:   •  aspirin 81 MG tablet, Take  by mouth daily., Disp: , Rfl:   •  citalopram (CeleXA) 20 MG tablet, TAKE 1 TABLET BY MOUTH DAILY., Disp: 90 tablet, Rfl: 3  •  Docusate Calcium (STOOL SOFTENER PO), Take 100 mg by mouth 3 (Three) Times a Day., Disp: , Rfl:   •  fexofenadine (ALLEGRA) 180 MG tablet, Take 180 mg by mouth Daily., Disp: , Rfl:   •  levothyroxine (SYNTHROID, LEVOTHROID) 100 MCG tablet, TAKE 1 TABLET EVERY DAY, Disp: 90 tablet, Rfl: 3  •  lovastatin (MEVACOR) 20 MG tablet, Take 1 tablet by mouth Every Night., Disp: 90 tablet, Rfl: 3  •  Morphine (MS CONTIN) 30 MG 12 hr tablet, take 1 tablet by mouth two times a day, Disp: , Rfl: 0  •  omeprazole (priLOSEC) 20 MG capsule, TAKE 1 CAPSULE BY MOUTH DAILY., Disp: 90 capsule, Rfl: 3  •  oxyCODONE (ROXICODONE) 10 MG tablet, Take  by mouth 2 (Two) Times a Day., Disp: , Rfl:     Objective       Blood pressure 135/68, pulse 94, temperature 98 °F (36.7 °C), temperature source Oral, resp. rate 16, weight 87.5 kg (193 lb), SpO2 94 %.    Physical Exam     General Appearance:    Alert, cooperative, no distress, appears stated age   Head:    Normocephalic, without obvious abnormality, atraumatic   Eyes:    PERRL, conjunctiva/corneas clear, EOM's intact   Ears:    Normal TM's and external ear canals, both ears   Nose:   Nares normal, septum midline, mucosa normal, no drainage   or sinus tenderness   Throat:   Lips, mucosa, and tongue normal; teeth and gums normal   Neck:   Supple, symmetrical, trachea midline, no adenopathy;        thyroid:  No enlargement/tenderness/nodules; no carotid    bruit or JVD   Back:     Symmetric, no curvature, ROM normal, no CVA tenderness   Lungs:     Clear to auscultation bilaterally, respirations unlabored   Chest wall:    No tenderness or deformity   Heart:    Regular rate and rhythm, S1 and S2 normal, no murmur,        rub or gallop   Abdomen:     Soft, non-tender, bowel sounds active all four quadrants,     no masses, no organomegaly   Extremities:   Extremities normal, atraumatic, no cyanosis or edema   Pulses:   2+ and symmetric all extremities   Skin:   Skin color, texture, turgor normal, no rashes or lesions   Lymph nodes:   Cervical, supraclavicular, and axillary nodes normal   Neurologic:   CNII-XII intact. Normal strength, sensation and reflexes       throughout      Results for orders placed or performed during the hospital encounter of 03/26/18   Stress Test With Myocardial Perfusion (1 Day)   Result Value Ref Range    Target HR (85%) 139 bpm    Max. Pred. HR (100%) 163 bpm    BH CV STRESS PROTOCOL 1 Pharmacologic     Stage 1 1     HR Stage 1 72     Duration Min Stage 1 1     Stress Dose Regadenoson Stage 1 0.4     Stress Comments Stage 1 10 sec bolus injection     Stage 2 2     HR Stage 2 85     BP Stage 2 102/60     Duration Min Stage 2 1     Duration Sec Stage 2  0     Stage 3 3     HR Stage 3 82     BP Stage 3 120/60     Duration Min Stage 3 1     Stage 4 4     Duration Min Stage 4 1     Baseline HR 70 bpm    Baseline /80 mmHg    HR Stage 4 78     Peak HR 86 bpm    Percent Max Pred HR 52.76 %    Percent Target HR 62 %    Peak /80 mmHg    Recovery HR 75 bpm    Recovery /70 mmHg    Exercise duration (min) 4 min    Exercise duration (sec) 0 sec    Nuc Stress EF 77 %    Nuclear Prior Study 3          Assessment/Plan   Low bp like volum depletion with med concentration. Pt cautioned on opiate use.  Pt will have to aggressively hydrate.        Terry was seen today for hyperlipidemia and back pain.    Diagnoses and all orders for this visit:    PVD (peripheral vascular disease) (CMS/HCC)  -     Doppler Ankle Brachial Index Multi Level CAR  -     Comprehensive Metabolic Panel    Acquired hypothyroidism  -     TSH  -     T4, Free  -     Comprehensive Metabolic Panel    Routine general medical examination at a health care facility      Return in about 3 months (around 10/10/2018).          There are no Patient Instructions on file for this visit.     Segundo Vo MD    Assessment/Plan

## 2018-07-12 ENCOUNTER — OFFICE VISIT (OUTPATIENT)
Dept: CARDIOLOGY | Facility: CLINIC | Age: 58
End: 2018-07-12

## 2018-07-12 VITALS
SYSTOLIC BLOOD PRESSURE: 105 MMHG | BODY MASS INDEX: 30.53 KG/M2 | HEART RATE: 89 BPM | WEIGHT: 190 LBS | HEIGHT: 66 IN | DIASTOLIC BLOOD PRESSURE: 70 MMHG

## 2018-07-12 DIAGNOSIS — Z72.0 TOBACCO ABUSE: ICD-10-CM

## 2018-07-12 DIAGNOSIS — J43.1 PANLOBULAR EMPHYSEMA (HCC): ICD-10-CM

## 2018-07-12 DIAGNOSIS — G47.33 OSA ON CPAP: ICD-10-CM

## 2018-07-12 DIAGNOSIS — Z99.89 OSA ON CPAP: ICD-10-CM

## 2018-07-12 DIAGNOSIS — I49.5 SICK SINUS SYNDROME (HCC): Primary | ICD-10-CM

## 2018-07-12 DIAGNOSIS — E78.5 DYSLIPIDEMIA: ICD-10-CM

## 2018-07-12 PROCEDURE — 99214 OFFICE O/P EST MOD 30 MIN: CPT | Performed by: INTERNAL MEDICINE

## 2018-09-04 ENCOUNTER — CLINICAL SUPPORT NO REQUIREMENTS (OUTPATIENT)
Dept: CARDIOLOGY | Facility: CLINIC | Age: 58
End: 2018-09-04

## 2018-09-04 DIAGNOSIS — R00.1 SINUS BRADYCARDIA: ICD-10-CM

## 2018-09-04 PROCEDURE — 93296 REM INTERROG EVL PM/IDS: CPT | Performed by: INTERNAL MEDICINE

## 2018-09-04 PROCEDURE — 93294 REM INTERROG EVL PM/LDLS PM: CPT | Performed by: INTERNAL MEDICINE

## 2018-09-14 RX ORDER — LOVASTATIN 20 MG/1
20 TABLET ORAL NIGHTLY
Qty: 90 TABLET | Refills: 3 | Status: SHIPPED | OUTPATIENT
Start: 2018-09-14 | End: 2019-10-04 | Stop reason: SDUPTHER

## 2018-10-04 ENCOUNTER — CLINICAL SUPPORT NO REQUIREMENTS (OUTPATIENT)
Dept: CARDIOLOGY | Facility: CLINIC | Age: 58
End: 2018-10-04

## 2018-10-04 DIAGNOSIS — I49.5 SICK SINUS SYNDROME (HCC): ICD-10-CM

## 2018-11-29 ENCOUNTER — CLINICAL SUPPORT NO REQUIREMENTS (OUTPATIENT)
Dept: CARDIOLOGY | Facility: CLINIC | Age: 58
End: 2018-11-29

## 2018-11-29 DIAGNOSIS — R00.1 BRADYCARDIA: Primary | ICD-10-CM

## 2018-12-05 RX ORDER — TIZANIDINE 4 MG/1
TABLET ORAL
Qty: 90 TABLET | Refills: 0 | Status: SHIPPED | OUTPATIENT
Start: 2018-12-05 | End: 2019-02-16 | Stop reason: SDUPTHER

## 2018-12-05 RX ORDER — OMEPRAZOLE 20 MG/1
CAPSULE, DELAYED RELEASE ORAL
Qty: 30 CAPSULE | Refills: 0 | Status: SHIPPED | OUTPATIENT
Start: 2018-12-05 | End: 2019-02-16 | Stop reason: SDUPTHER

## 2018-12-05 RX ORDER — LEVOTHYROXINE SODIUM 0.1 MG/1
TABLET ORAL
Qty: 30 TABLET | Refills: 0 | Status: SHIPPED | OUTPATIENT
Start: 2018-12-05 | End: 2019-02-16 | Stop reason: SDUPTHER

## 2019-01-28 ENCOUNTER — PATIENT MESSAGE (OUTPATIENT)
Dept: CARDIOLOGY | Facility: CLINIC | Age: 59
End: 2019-01-28

## 2019-02-16 RX ORDER — OMEPRAZOLE 20 MG/1
20 CAPSULE, DELAYED RELEASE ORAL DAILY
Qty: 90 CAPSULE | Refills: 0 | Status: SHIPPED | OUTPATIENT
Start: 2019-02-16 | End: 2019-05-30 | Stop reason: SDUPTHER

## 2019-02-16 RX ORDER — TIZANIDINE 4 MG/1
4 TABLET ORAL 3 TIMES DAILY PRN
Qty: 90 TABLET | Refills: 0 | Status: SHIPPED | OUTPATIENT
Start: 2019-02-16 | End: 2019-05-30 | Stop reason: SDUPTHER

## 2019-02-16 RX ORDER — LEVOTHYROXINE SODIUM 0.1 MG/1
100 TABLET ORAL DAILY
Qty: 90 TABLET | Refills: 0 | Status: SHIPPED | OUTPATIENT
Start: 2019-02-16 | End: 2019-05-30 | Stop reason: SDUPTHER

## 2019-02-16 RX ORDER — CITALOPRAM 20 MG/1
20 TABLET ORAL DAILY
Qty: 90 TABLET | Refills: 0 | Status: SHIPPED | OUTPATIENT
Start: 2019-02-16 | End: 2019-05-30 | Stop reason: SDUPTHER

## 2019-03-23 ENCOUNTER — CLINICAL SUPPORT NO REQUIREMENTS (OUTPATIENT)
Dept: CARDIOLOGY | Facility: CLINIC | Age: 59
End: 2019-03-23

## 2019-03-23 DIAGNOSIS — R00.1 BRADYCARDIA, SINUS: ICD-10-CM

## 2019-03-23 PROCEDURE — 93296 REM INTERROG EVL PM/IDS: CPT | Performed by: INTERNAL MEDICINE

## 2019-03-23 PROCEDURE — 93294 REM INTERROG EVL PM/LDLS PM: CPT | Performed by: INTERNAL MEDICINE

## 2019-04-03 NOTE — PROGRESS NOTES
Subjective:     Encounter Date:04/11/2019 - [place] [facility]     Patient ID: Terry Mcguire is a 58 y.o.  white male, retired/disabled , and resident of [place], [place].     INTERNIST:  Segundo Vo MD  [facility] CARDIOLOGIST:  Segundo Duran MD, [facility]  NEUROLOGIST:  Shukri Taylor MD  PAIN SPECIALIST:  [facility]    Chief Complaint:   Chief Complaint   Patient presents with   • SSS     Problem List:  1. Symptomatic bradycardia:  a. Status post [employer] pacemaker implantation in 2006.  b. Subsequent [employer] Accent DR/RF 2210 pacemaker implant, 05/22/2013  c. Abnormal interrogation with intermittent atrial tachycardias and nonsustained ventricular tachycardia, March 2018, with normal nuclear stress test  d. Abnormal acceptable pacemaker interrogation, April 2019  2. Benign hypertension.  3. Hypercholesterolemia.  4.  Chest pain syndrome:  a. Abnormal Cardiolite, 09/26/2006, with inferior  wall defect.  b. Normal cardiac catheterization, 10/06/2006; normal EF.  c. Acceptable negative IV Lexiscan Cardiolite stress study suggestive of low probability for significant focal obstructive coronary artery disease with preserved systolic left ventricular function (LVEF 0.77).  5. Ongoing tobacco abuse with chronic obstructive pulmonary disease (1.5 PPD)  6. Obstructive sleep apnea, with CPAP.  7. Seasonal allergies/rhinitis.  8. Osteoarthritis with chronic pain issues.  9. Gastroesophageal reflux disease with hiatal hernia.  10. Hypothyroidism  on chronic supplementation.  11. Essential tremors.  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  13. Mild obesity, BMI 31  14. Family history of hypertension.  15. Surgical history:  a. Pacemaker implantation.  b. Left ankle surgery.  c. Multiple colonoscopies.  d. Herniorrhaphy.  e. Bilateral elbow surgery.  f. Bilateral carpal tunnel release.    No Known Allergies    Current Outpatient Medications:   •  aspirin 81  "MG tablet, Take  by mouth daily., Disp: , Rfl:   •  citalopram (CeleXA) 20 MG tablet, Take 1 tablet by mouth Daily. PATIENT NEEDS FOLLOW UP APPT FOR FURTHER REFILLS., Disp: 90 tablet, Rfl: 0  •  fexofenadine (ALLEGRA) 180 MG tablet, Take 180 mg by mouth Daily., Disp: , Rfl:   •  levothyroxine (SYNTHROID, LEVOTHROID) 100 MCG tablet, Take 1 tablet by mouth Daily. PATIENT NEEDS FOLLOW UP APPT FOR FURTHER REFILLS., Disp: 90 tablet, Rfl: 0  •  lovastatin (MEVACOR) 20 MG tablet, TAKE 1 TABLET BY MOUTH EVERY NIGHT., Disp: 90 tablet, Rfl: 3  •  Morphine (MS CONTIN) 30 MG 12 hr tablet, take 1 tablet by mouth two times a day, Disp: , Rfl: 0  •  omeprazole (priLOSEC) 20 MG capsule, Take 1 capsule by mouth Daily. PATIENT NEEDS FOLLOW UP APPT FOR FURTHER REFILLS., Disp: 90 capsule, Rfl: 0  •  oxyCODONE (ROXICODONE) 10 MG tablet, Take  by mouth 2 (Two) Times a Day., Disp: , Rfl:   •  tiZANidine (ZANAFLEX) 4 MG tablet, Take 1 tablet by mouth 3 (Three) Times a Day As Needed for Muscle Spasms. PATIENT NEEDS FOLLOW UP APPT FOR FURTHER REFILLS., Disp: 90 tablet, Rfl: 0    History of Present Illness: Patient returns for followup after a 9-month hiatus; he was a no-show for an appointment in January 2019.  He continues to smoke, and he says that he has been under a lot of stress due to family matters and with losing a couple of family pets (dogs) due to old age.  He notes that he has tried Chantix in the past, but it gave him \"real bad dreams.\"  He asks if we have the results of the echo on his legs, and the information we have is reviewed with him.  He says that when he wakes up, his legs are red.  He states that he never feels his heart beating fast or irregular.  He had his pacemaker interrogated today, and he says they called him once because \"something had peaked ,\" but he did not feel anything.  He has no chest pain, tightness, or pressure with his daily activities. Patient otherwise denies chest pain, shortness of breath, PND, " "edema, palpitations, syncope or presyncope at this time.  He continues to smoke 2 packs a day.        Review of Systems   HENT: Positive for tinnitus.    Hematologic/Lymphatic: Bruises/bleeds easily.   Skin: Positive for poor wound healing.   Musculoskeletal: Positive for back pain, muscle cramps, muscle weakness, myalgias, neck pain and stiffness.   Neurological: Positive for headaches.      Obtained and negative except as outlined in problem list and HPI.    Procedures [employer] Bishop Accent DR CHAPARRO 2210 DOI 05/22/2013 - DDDR , underlying sinus 60s, atrial paced 90%, ventricular paced 2%, P wave 4.2 volts, R wave >12.0 volts, nominal atrial or ventricular threshold and impedance.  Battery voltage 2.92.  CHRISTIAN 6.5-7.4.  56 HBR with 5 consecutive PVC episodes.  990 mode switches, less than 1% of the time, without atrial fibrillation.  Home monitor in place. Abnormal acceptable interrogation without reprogramming.       Objective:       Vitals:    04/11/19 1503 04/11/19 1506   BP: 128/69 113/63   BP Location: Left arm Left arm   Patient Position: Sitting Standing   Pulse: 70    Weight: 87.1 kg (192 lb)    Height: 167.6 cm (66\")      Body mass index is 30.99 kg/m².   Last weight:  190 lbs.    Physical Exam   Constitutional: He is oriented to person, place, and time. He appears well-developed and well-nourished.   Neck: No JVD present. Carotid bruit is not present. No thyromegaly present.   Cardiovascular: Regular rhythm, S1 normal and S2 normal. Exam reveals no gallop, no S3 and no friction rub.   Murmur heard.   Medium-pitched early systolic murmur is present with a grade of 2/6 at the lower left sternal border.  Pulses:       Carotid pulses are 1+ on the right side, and 1+ on the left side.       Radial pulses are 1+ on the right side, and 1+ on the left side.        Femoral pulses are 1+ on the right side, and 1+ on the left side.       Popliteal pulses are 1+ on the right side, and 1+ on the left side.        Dorsalis " pedis pulses are 1+ on the right side, and 1+ on the left side.        Posterior tibial pulses are 1+ on the right side, and 1+ on the left side.   Pulmonary/Chest: Effort normal. He has decreased breath sounds. He has no wheezes. He has no rhonchi. He has no rales.   Left precordial pacemaker site is nominal   Abdominal: Soft. He exhibits no mass. There is no hepatosplenomegaly. There is no tenderness. There is no guarding.   Bowel sounds audible x4   Musculoskeletal: Normal range of motion. He exhibits no edema.   Lymphadenopathy:     He has no cervical adenopathy.   Neurological: He is alert and oriented to person, place, and time.   Skin: Skin is warm, dry and intact. No rash noted.   Marked tobacco smell   Vitals reviewed.        Lab Review:   Lower arterial Doppler, 08/14/2018, [facility]:  Exercise treadmill testing was performed for a total of 03:47 minutes.  The patient's post-exercise systolic blood pressure was 127 mmHg.    08/16/2018:  · Hepatic function panel - protein 6.7, bilirubin 0.4, alk phos 133, AST 52, ALT 39  · Renal panel - glucose 99, BUN 21, creatinine 1.31, sodium 135, potassium 4.5, chloride 98, CO2 - 20, calcium 9.2, phosphorus 3.2, albumin 4.4  · GGT - 239  · CBC - WBC 6.9, RBC 5.38, hemoglobin 16.5, hematocrit 48.2%, MCV 90, MCH 30.7, MCHC 34.2, RDW 13.8, platelets 187, normal indices and differential    Lab Results   Component Value Date    BUN 27 (H) 05/22/2017    CREATININE 1.20 05/22/2017    EGFRIFNONA 63 05/22/2017    EGFRIFAFRI 76 05/22/2017    BCR 22.5 (H) 05/22/2017    CO2 25.0 (L) 05/22/2017    CALCIUM 9.5 05/22/2017    PROTENTOTREF 6.4 05/22/2017    ALBUMIN 4.00 05/22/2017    LABIL2 1.7 05/22/2017    AST 56 (H) 05/22/2017    ALT 57 05/22/2017       Lab Results   Component Value Date    WBC 6.65 05/22/2017    HGB 15.1 05/22/2017    HCT 45.6 05/22/2017    MCV 94.2 (H) 05/22/2017     05/22/2017       Lab Results   Component Value Date    TSH 1.000 05/22/2017       Lab Results    Component Value Date    TRIG 219 (H) 05/22/2017     Lab Results   Component Value Date    HDL 45 05/22/2017           Assessment:       Overall continued acceptable course with no interim cardiopulmonary complaints with fair functional status. We will defer additional diagnostic or therapeutic intervention from a cardiac perspective at this time.     Diagnosis Plan   1. Sick sinus syndrome (CMS/HCC)  Acceptable device interrogation Continue current treatment.    2. Benign hypertension  Acceptable control; Continue current medications and followup with Dr. Vo   3. Cerebrovascular accident (CVA), unspecified mechanism (CMS/HCC)  No recurrent neurologic symptoms or events documented   4. Panlobular emphysema (CMS/HCC)  Urgent need to discontinue tobacco use; patient very refractory to discontinuation.  I advised the patient of the risks of continuing to use tobacco, and I provided this patient with smoking cessation educational materials and discussed how to quit smoking and patient has expressed no willingness to quit.  Counseled patient for 3-5 minutes     5. Chest pain syndrome  No recurrent symptoms          Plan:         1. Patient to continue current medications and close follow up with the above providers.  2. Tentative cardiology follow up in March or April 2020, or patient may return sooner PRN.    Transcribed by Silvana David for Dr. Noam Foster at 9:36 AM on 04/11/2019    I, Noam Foster MD, [facility], personally performed the services described in this documentation as scribed by the above named individual in my presence, and it is both accurate and complete. At 3:22 PM on 04/11/2019

## 2019-04-11 ENCOUNTER — OFFICE VISIT (OUTPATIENT)
Dept: CARDIOLOGY | Facility: CLINIC | Age: 59
End: 2019-04-11

## 2019-04-11 VITALS
BODY MASS INDEX: 30.86 KG/M2 | DIASTOLIC BLOOD PRESSURE: 63 MMHG | HEART RATE: 70 BPM | HEIGHT: 66 IN | WEIGHT: 192 LBS | SYSTOLIC BLOOD PRESSURE: 113 MMHG

## 2019-04-11 DIAGNOSIS — I63.9 CEREBROVASCULAR ACCIDENT (CVA), UNSPECIFIED MECHANISM (HCC): ICD-10-CM

## 2019-04-11 DIAGNOSIS — I49.5 SICK SINUS SYNDROME (HCC): Primary | ICD-10-CM

## 2019-04-11 DIAGNOSIS — I10 BENIGN HYPERTENSION: ICD-10-CM

## 2019-04-11 DIAGNOSIS — R07.9 CHEST PAIN SYNDROME: ICD-10-CM

## 2019-04-11 DIAGNOSIS — J43.1 PANLOBULAR EMPHYSEMA (HCC): ICD-10-CM

## 2019-04-11 PROCEDURE — 99214 OFFICE O/P EST MOD 30 MIN: CPT | Performed by: INTERNAL MEDICINE

## 2019-04-11 PROCEDURE — 93288 INTERROG EVL PM/LDLS PM IP: CPT | Performed by: INTERNAL MEDICINE

## 2019-04-11 PROCEDURE — 99406 BEHAV CHNG SMOKING 3-10 MIN: CPT | Performed by: INTERNAL MEDICINE

## 2019-05-31 RX ORDER — LEVOTHYROXINE SODIUM 0.1 MG/1
100 TABLET ORAL DAILY
Qty: 90 TABLET | Refills: 0 | Status: SHIPPED | OUTPATIENT
Start: 2019-05-31 | End: 2019-10-04 | Stop reason: SDUPTHER

## 2019-05-31 RX ORDER — BISOPROLOL FUMARATE 5 MG/1
TABLET, FILM COATED ORAL
Qty: 45 TABLET | Refills: 3 | Status: SHIPPED | OUTPATIENT
Start: 2019-05-31 | End: 2019-12-10 | Stop reason: ALTCHOICE

## 2019-05-31 RX ORDER — OMEPRAZOLE 20 MG/1
20 CAPSULE, DELAYED RELEASE ORAL DAILY
Qty: 90 CAPSULE | Refills: 0 | Status: SHIPPED | OUTPATIENT
Start: 2019-05-31 | End: 2019-12-10 | Stop reason: SDUPTHER

## 2019-05-31 RX ORDER — TIZANIDINE 4 MG/1
TABLET ORAL
Qty: 90 TABLET | Refills: 0 | Status: SHIPPED | OUTPATIENT
Start: 2019-05-31 | End: 2019-09-18

## 2019-05-31 RX ORDER — CITALOPRAM 20 MG/1
20 TABLET ORAL DAILY
Qty: 90 TABLET | Refills: 0 | Status: SHIPPED | OUTPATIENT
Start: 2019-05-31 | End: 2019-12-10

## 2019-06-27 ENCOUNTER — CLINICAL SUPPORT NO REQUIREMENTS (OUTPATIENT)
Dept: CARDIOLOGY | Facility: CLINIC | Age: 59
End: 2019-06-27

## 2019-06-27 DIAGNOSIS — R00.1 BRADYCARDIA, SINUS: ICD-10-CM

## 2019-06-27 PROCEDURE — 93294 REM INTERROG EVL PM/LDLS PM: CPT | Performed by: INTERNAL MEDICINE

## 2019-06-27 PROCEDURE — 93296 REM INTERROG EVL PM/IDS: CPT | Performed by: INTERNAL MEDICINE

## 2019-07-24 ENCOUNTER — CLINICAL SUPPORT NO REQUIREMENTS (OUTPATIENT)
Dept: CARDIOLOGY | Facility: CLINIC | Age: 59
End: 2019-07-24

## 2019-07-24 DIAGNOSIS — R00.1 SINUS BRADYCARDIA: Primary | ICD-10-CM

## 2019-09-13 ENCOUNTER — TELEPHONE (OUTPATIENT)
Dept: INTERNAL MEDICINE | Facility: CLINIC | Age: 59
End: 2019-09-13

## 2019-09-16 DIAGNOSIS — Z00.00 ROUTINE GENERAL MEDICAL EXAMINATION AT A HEALTH CARE FACILITY: ICD-10-CM

## 2019-09-16 DIAGNOSIS — R53.83 FATIGUE, UNSPECIFIED TYPE: ICD-10-CM

## 2019-09-16 DIAGNOSIS — Z12.5 PROSTATE CANCER SCREENING: Primary | ICD-10-CM

## 2019-09-16 DIAGNOSIS — E78.00 HYPERCHOLESTEREMIA: ICD-10-CM

## 2019-09-18 ENCOUNTER — OFFICE VISIT (OUTPATIENT)
Dept: INTERNAL MEDICINE | Facility: CLINIC | Age: 59
End: 2019-09-18

## 2019-09-18 VITALS
OXYGEN SATURATION: 97 % | HEIGHT: 66 IN | BODY MASS INDEX: 29.7 KG/M2 | SYSTOLIC BLOOD PRESSURE: 124 MMHG | TEMPERATURE: 98.9 F | DIASTOLIC BLOOD PRESSURE: 65 MMHG | HEART RATE: 70 BPM | WEIGHT: 184.8 LBS | RESPIRATION RATE: 16 BRPM

## 2019-09-18 DIAGNOSIS — L29.9 EAR ITCHING: ICD-10-CM

## 2019-09-18 DIAGNOSIS — R91.1 LUNG NODULE: ICD-10-CM

## 2019-09-18 DIAGNOSIS — I10 BENIGN HYPERTENSION: Primary | ICD-10-CM

## 2019-09-18 DIAGNOSIS — G60.0 CHARCOT-MARIE-TOOTH DISEASE: ICD-10-CM

## 2019-09-18 DIAGNOSIS — Z00.00 ROUTINE GENERAL MEDICAL EXAMINATION AT A HEALTH CARE FACILITY: ICD-10-CM

## 2019-09-18 DIAGNOSIS — M62.838 MUSCLE SPASM: ICD-10-CM

## 2019-09-18 DIAGNOSIS — Z72.0 TOBACCO ABUSE: ICD-10-CM

## 2019-09-18 PROCEDURE — 96160 PT-FOCUSED HLTH RISK ASSMT: CPT | Performed by: INTERNAL MEDICINE

## 2019-09-18 PROCEDURE — G0439 PPPS, SUBSEQ VISIT: HCPCS | Performed by: INTERNAL MEDICINE

## 2019-09-18 PROCEDURE — 99396 PREV VISIT EST AGE 40-64: CPT | Performed by: INTERNAL MEDICINE

## 2019-09-18 RX ORDER — CYCLOBENZAPRINE HCL 10 MG
10 TABLET ORAL 3 TIMES DAILY PRN
Qty: 20 TABLET | Refills: 11 | Status: SHIPPED | OUTPATIENT
Start: 2019-09-18 | End: 2019-12-10 | Stop reason: SDUPTHER

## 2019-09-18 NOTE — PROGRESS NOTES
QUICK REFERENCE INFORMATION:  The ABCs of the Annual Wellness Visit    Medicare Annual Wellness Visit    Subjective   History of Present Illness    Terry Mcguire is a 59 y.o. male who presents for an Annual Wellness Visit. In addition, we addressed the following health issues:itching ear, muscle spasm  Chronic pain:6/10      PMH, PSH, SocHx, FamHx, Allergies, and Medications: Reviewed and updated.     Outpatient Medications Prior to Visit   Medication Sig Dispense Refill   • aspirin 81 MG tablet Take  by mouth daily.     • bisoprolol (ZEBeta) 5 MG tablet TAKE 1/2 TABLET EVERY DAY 45 tablet 3   • citalopram (CeleXA) 20 MG tablet TAKE 1 TABLET BY MOUTH DAILY. PATIENT NEEDS FOLLOW UP APPT FOR FURTHER REFILLS. 90 tablet 0   • fexofenadine (ALLEGRA) 180 MG tablet Take 180 mg by mouth Daily.     • levothyroxine (SYNTHROID, LEVOTHROID) 100 MCG tablet TAKE 1 TABLET BY MOUTH DAILY. PATIENT NEEDS FOLLOW UP APPT FOR FURTHER REFILLS. 90 tablet 0   • lovastatin (MEVACOR) 20 MG tablet TAKE 1 TABLET BY MOUTH EVERY NIGHT. 90 tablet 3   • Morphine (MS CONTIN) 30 MG 12 hr tablet take 1 tablet by mouth two times a day  0   • omeprazole (priLOSEC) 20 MG capsule TAKE 1 CAPSULE BY MOUTH DAILY. PATIENT NEEDS FOLLOW UP APPT FOR FURTHER REFILLS. 90 capsule 0   • oxyCODONE (ROXICODONE) 10 MG tablet Take  by mouth 2 (Two) Times a Day.     • tiZANidine (ZANAFLEX) 4 MG tablet TAKE 1 TABLET THREE TIMES DAILY AS NEEDED FOR MUSCLE SPASM(S) (NEED MD APPOINTMENT) 90 tablet 0     No facility-administered medications prior to visit.        Patient Active Problem List   Diagnosis   • Chronic obstructive pulmonary disease (CMS/HCC)   • Eczema   • Hypercholesterolemia   • Hyperthyroidism   • Hypothyroidism   • Nausea   • NETTIE on CPAP   • Hereditary sensorimotor neuropathy   • Cerebrovascular accident (CMS/HCC)   • Vitamin D deficiency   • Benign hypertension   • Chest pain syndrome   • Tobacco abuse   • Seasonal allergic rhinitis   • Osteoarthritis    • Gastroesophageal reflux disease with hiatal hernia   • Essential tremor   • Charcot-Jolene-Tooth disease   • Obesity   • Sick sinus syndrome (CMS/HCC)   • Skin lesion of chest wall   • Dyslipidemia   • Abnormal electrocardiogram        Health Habits:  Dental Exam. up to date  Eye Exam. up to date  No exam data present  Exercise: 2 times/week.  Current exercise activities include: walking    Health Risk Assessment:  The patient has completed a Health Risk Assessment. This has been reviewed with them and has been scanned into the patient's chart.    Current Medical Providers:  Patient Care Team:  Segundo Vo MD as PCP - General    The [facility] providers who are involved in the care of this patient are listed above. Additional providers and suppliers are listed below:  [facility].        Recent Hospitalizations:  No hospitalization(s) within the last year..    Recent Lab Results:  CMP:  Lab Results   Component Value Date    GLU 92 05/22/2017    BUN 27 (H) 05/22/2017    CREATININE 1.20 05/22/2017    EGFRIFNONA 63 05/22/2017    EGFRIFAFRI 76 05/22/2017    BCR 22.5 (H) 05/22/2017     05/22/2017    K 5.7 (C) 05/22/2017    CO2 25.0 (L) 05/22/2017    CALCIUM 9.5 05/22/2017    PROTENTOTREF 6.4 05/22/2017    ALBUMIN 4.00 05/22/2017    LABGLOBREF 2.4 05/22/2017    LABIL2 1.7 05/22/2017    BILITOT 0.3 05/22/2017    ALKPHOS 121 05/22/2017    AST 56 (H) 05/22/2017    ALT 57 05/22/2017       LIPID PANEL:  Lab Results   Component Value Date    CHLPL 168 05/22/2017    TRIG 219 (H) 05/22/2017    HDL 45 05/22/2017    VLDL 43.8 05/22/2017    LDL 79 05/22/2017       HbA1c:  No results found for: HGBA1C    URINE MICROALBUMIN:  No results found for: MICROALBUR, POCMALB    PSA:  No results found for: PSA    Age-appropriate Screening Schedule:  Refer to the list below for future screening recommendations based on patient's age, sex and/or medical conditions. Orders for these recommended tests are listed in  the plan section. The patient has been provided with a written plan.    Health Maintenance   Topic Date Due   • TDAP/TD VACCINES (1 - Tdap) 06/04/1979   • ZOSTER VACCINE (1 of 2) 06/04/2010   • LIPID PANEL  05/22/2018   • COLONOSCOPY  10/12/2026   • PNEUMOCOCCAL VACCINE (19-64 MEDIUM RISK)  Completed   • INFLUENZA VACCINE  Addressed       Depression Screen:   PHQ-2/PHQ-9 Depression Screening 9/18/2019   Little interest or pleasure in doing things 0   Feeling down, depressed, or hopeless 0   Trouble falling or staying asleep, or sleeping too much -   Feeling tired or having little energy -   Poor appetite or overeating -   Feeling bad about yourself - or that you are a failure or have let yourself or your family down -   Trouble concentrating on things, such as reading the newspaper or watching television -   Moving or speaking so slowly that other people could have noticed. Or the opposite - being so fidgety or restless that you have been moving around a lot more than usual -   Thoughts that you would be better off dead, or of hurting yourself in some way -   Total Score 0   If you checked off any problems, how difficult have these problems made it for you to do your work, take care of things at home, or get along with other people? -         Functional and Cognitive Screening:  Functional & Cognitive Status 9/18/2019   Do you have difficulty preparing food and eating? No   Do you have difficulty bathing yourself, getting dressed or grooming yourself? No   Do you have difficulty using the toilet? No   Do you have difficulty moving around from place to place? No   Do you have trouble with steps or getting out of a bed or a chair? No   Current Diet Well Balanced Diet   Dental Exam Up to date   Eye Exam Up to date   Exercise (times per week) 2 times per week   Current Exercise Activities Include Walking   Do you need help using the phone?  No   Are you deaf or do you have serious difficulty hearing?  No   Do you need  "help with transportation? No   Do you need help shopping? No   Do you need help preparing meals?  No   Do you need help with housework?  No   Do you need help with laundry? No   Do you need help taking your medications? No   Do you need help managing money? No   Do you ever drive or ride in a car without wearing a seat belt? No   Have you felt unusual stress, anger or loneliness in the last month? No   Who do you live with? Spouse   If you need help, do you have trouble finding someone available to you? No   Have you been bothered in the last four weeks by sexual problems? Yes   Do you have difficulty concentrating, remembering or making decisions? No       Does the patient have evidence of cognitive impairment? No    Advanced Care Planning:  Patient does not have an advance directive - not interested in additional information    Identification of Risk Factors:  Risk factors include: Advance Directive Discussion  Fall Risk.    Review of Systems    Compared to one year ago, the patient feels his physical health is worse.  Compared to one year ago, the patient feels his mental health is the same.    Objective     Physical Exam    Vitals:    09/18/19 1119   BP: 124/65   BP Location: Left arm   Patient Position: Sitting   Cuff Size: Adult   Pulse: 70   Resp: 16   Temp: 98.9 °F (37.2 °C)   TempSrc: Oral   SpO2: 97%   Weight: 83.8 kg (184 lb 12.8 oz)   Height: 167.6 cm (66\")       Body mass index is 29.83 kg/m².  Discussed the patient's BMI with him. The BMI is in the acceptable range.    Assessment/Plan   Patient Self-Management and Personalized Health Advice  The patient has been provided with information about: diet, exercise, weight management and tobacco cessation and preventive services including:   · Annual Wellness Visit (AWV)  · Influenza Vaccine and Administration.    Visit Diagnoses:    ICD-10-CM ICD-9-CM   1. Benign hypertension I10 401.1   2. Charcot-Jolene-Tooth disease G60.0 356.1   3. Ear itching L29.9 " 698.9   4. Muscle spasm M62.838 728.85   5. Lung nodule R91.1 793.11   6. Tobacco abuse Z72.0 305.1   7. Routine general medical examination at a health care facility Z00.00 V70.0       Orders Placed This Encounter   Procedures   • CT Chest Without Contrast     Standing Status:   Future     Standing Expiration Date:   9/18/2020     Scheduling Instructions:      Set up in [place].   • Ambulatory Referral to ENT (Otolaryngology)     Referral Priority:   Routine     Referral Type:   Consultation     Referral Reason:   Specialty Services Required     Referred to Provider:   John Lockett MD     Requested Specialty:   Otolaryngology     Number of Visits Requested:   1       Outpatient Encounter Medications as of 9/18/2019   Medication Sig Dispense Refill   • aspirin 81 MG tablet Take  by mouth daily.     • bisoprolol (ZEBeta) 5 MG tablet TAKE 1/2 TABLET EVERY DAY 45 tablet 3   • citalopram (CeleXA) 20 MG tablet TAKE 1 TABLET BY MOUTH DAILY. PATIENT NEEDS FOLLOW UP APPT FOR FURTHER REFILLS. 90 tablet 0   • fexofenadine (ALLEGRA) 180 MG tablet Take 180 mg by mouth Daily.     • levothyroxine (SYNTHROID, LEVOTHROID) 100 MCG tablet TAKE 1 TABLET BY MOUTH DAILY. PATIENT NEEDS FOLLOW UP APPT FOR FURTHER REFILLS. 90 tablet 0   • lovastatin (MEVACOR) 20 MG tablet TAKE 1 TABLET BY MOUTH EVERY NIGHT. 90 tablet 3   • Morphine (MS CONTIN) 30 MG 12 hr tablet take 1 tablet by mouth two times a day  0   • omeprazole (priLOSEC) 20 MG capsule TAKE 1 CAPSULE BY MOUTH DAILY. PATIENT NEEDS FOLLOW UP APPT FOR FURTHER REFILLS. 90 capsule 0   • oxyCODONE (ROXICODONE) 10 MG tablet Take  by mouth 2 (Two) Times a Day.     • [DISCONTINUED] tiZANidine (ZANAFLEX) 4 MG tablet TAKE 1 TABLET THREE TIMES DAILY AS NEEDED FOR MUSCLE SPASM(S) (NEED MD APPOINTMENT) 90 tablet 0   • cyclobenzaprine (FLEXERIL) 10 MG tablet Take 1 tablet by mouth 3 (Three) Times a Day As Needed for Muscle Spasms. 20 tablet 11     No facility-administered encounter  medications on file as of 9/18/2019.        Reviewed use of high risk medication in the elderly: yes  Reviewed for potential of harmful drug interactions in the elderly: yes    Follow Up:  Return in about 3 months (around 12/18/2019).     An After Visit Summary and PPPS with all of these plans were given to the patient.             Terry was seen today for hyperlipidemia and ear issues.    Diagnoses and all orders for this visit:    Benign hypertension    Charcot-Jolene-Tooth disease    Ear itching  -     Ambulatory Referral to ENT (Otolaryngology)    Muscle spasm  -     cyclobenzaprine (FLEXERIL) 10 MG tablet; Take 1 tablet by mouth 3 (Three) Times a Day As Needed for Muscle Spasms.    Lung nodule    Tobacco abuse  -     CT Chest Without Contrast; Future    Routine general medical examination at a health care facility

## 2019-09-18 NOTE — PROGRESS NOTES
Subjective     Patient ID: Terry Mcguire is a 59 y.o. male. Patient is here for management of multiple medical problems.     Chief Complaint   Patient presents with   • Hyperlipidemia     follow-up on labs   • ear issues     patient states his ears have been itching x 6 months, left ear is worse     History of Present Illness     Pt had labs done last Monday at [facility]. No results yet for me to see.    Ears itching x 6 month. Something crawling in side.  No otc.  Using Q tips.      Muscle spasm and zanaflex not working as well as flexaril.  Pt would like to change.      The following portions of the patient's history were reviewed and updated as appropriate: allergies, current medications, past family history, past medical history, past social history, past surgical history and problem list.    Review of Systems   Constitutional: Positive for fatigue.   Genitourinary: Negative for scrotal swelling, testicular pain and urgency.   Musculoskeletal: Positive for arthralgias, back pain and gait problem.   Psychiatric/Behavioral: Negative for self-injury and sleep disturbance. The patient is not nervous/anxious.    All other systems reviewed and are negative.      Current Outpatient Medications:   •  aspirin 81 MG tablet, Take  by mouth daily., Disp: , Rfl:   •  bisoprolol (ZEBeta) 5 MG tablet, TAKE 1/2 TABLET EVERY DAY, Disp: 45 tablet, Rfl: 3  •  citalopram (CeleXA) 20 MG tablet, TAKE 1 TABLET BY MOUTH DAILY. PATIENT NEEDS FOLLOW UP APPT FOR FURTHER REFILLS., Disp: 90 tablet, Rfl: 0  •  fexofenadine (ALLEGRA) 180 MG tablet, Take 180 mg by mouth Daily., Disp: , Rfl:   •  levothyroxine (SYNTHROID, LEVOTHROID) 100 MCG tablet, TAKE 1 TABLET BY MOUTH DAILY. PATIENT NEEDS FOLLOW UP APPT FOR FURTHER REFILLS., Disp: 90 tablet, Rfl: 0  •  lovastatin (MEVACOR) 20 MG tablet, TAKE 1 TABLET BY MOUTH EVERY NIGHT., Disp: 90 tablet, Rfl: 3  •  Morphine (MS CONTIN) 30 MG 12 hr tablet, take 1 tablet by mouth two times a day,  "Disp: , Rfl: 0  •  omeprazole (priLOSEC) 20 MG capsule, TAKE 1 CAPSULE BY MOUTH DAILY. PATIENT NEEDS FOLLOW UP APPT FOR FURTHER REFILLS., Disp: 90 capsule, Rfl: 0  •  oxyCODONE (ROXICODONE) 10 MG tablet, Take  by mouth 2 (Two) Times a Day., Disp: , Rfl:   •  cyclobenzaprine (FLEXERIL) 10 MG tablet, Take 1 tablet by mouth 3 (Three) Times a Day As Needed for Muscle Spasms., Disp: 20 tablet, Rfl: 11    Objective      Blood pressure 124/65, pulse 70, temperature 98.9 °F (37.2 °C), temperature source Oral, resp. rate 16, height 167.6 cm (66\"), weight 83.8 kg (184 lb 12.8 oz), SpO2 97 %.    Physical Exam     General Appearance:    Alert, cooperative, no distress, appears stated age   Head:    Normocephalic, without obvious abnormality, atraumatic   Eyes:    PERRL, conjunctiva/corneas clear, EOM's intact   Ears:    abNormal TM right with tube. Left with ambnormal cone of light.   and external ear canals, both ears   Nose:   Nares normal, septum midline, mucosa normal, no drainage   or sinus tenderness   Throat:   Lips, mucosa, and tongue normal; teeth and gums normal   Neck:   Supple, symmetrical, trachea midline, no adenopathy;        thyroid:  No enlargement/tenderness/nodules; no carotid    bruit or JVD   Back:     Symmetric, no curvature, ROM normal, no CVA tenderness   Lungs:     Clear to auscultation bilaterally, respirations unlabored   Chest wall:    No tenderness or deformity   Heart:    Regular rate and rhythm, S1 and S2 normal, no murmur,        rub or gallop   Abdomen:     Soft, non-tender, bowel sounds active all four quadrants,     no masses, no organomegaly   Extremities:   Hammer toes. High arch feet.     Pulses:   2+ and symmetric all extremities   Skin:   Skin color, texture, turgor normal, no rashes or lesions   Lymph nodes:   Cervical, supraclavicular, and axillary nodes normal   Neurologic:   CNII-XII intact. Normal strength, sensation and reflexes       throughout      Results for orders placed or " performed during the hospital encounter of 03/26/18   Stress Test With Myocardial Perfusion (1 Day)   Result Value Ref Range    Target HR (85%) 139 bpm    Max. Pred. HR (100%) 163 bpm    BH CV STRESS PROTOCOL 1 Pharmacologic     Stage 1 1     HR Stage 1 72     Duration Min Stage 1 1     Stress Dose Regadenoson Stage 1 0.4     Stress Comments Stage 1 10 sec bolus injection     Stage 2 2     HR Stage 2 85     BP Stage 2 102/60     Duration Min Stage 2 1     Duration Sec Stage 2 0     Stage 3 3     HR Stage 3 82     BP Stage 3 120/60     Duration Min Stage 3 1     Stage 4 4     Duration Min Stage 4 1     Baseline HR 70 bpm    Baseline /80 mmHg    HR Stage 4 78     Peak HR 86 bpm    Percent Max Pred HR 52.76 %    Percent Target HR 62 %    Peak /80 mmHg    Recovery HR 75 bpm    Recovery /70 mmHg    Exercise duration (min) 4 min    Exercise duration (sec) 0 sec    Nuc Stress EF 77 %    Nuclear Prior Study 3          Assessment/Plan       Terry was seen today for hyperlipidemia and ear issues.    Diagnoses and all orders for this visit:    Benign hypertension    Charcot-Jolene-Tooth disease    Ear itching  -     Ambulatory Referral to ENT (Otolaryngology)    Muscle spasm  -     cyclobenzaprine (FLEXERIL) 10 MG tablet; Take 1 tablet by mouth 3 (Three) Times a Day As Needed for Muscle Spasms.      No Follow-up on file.          There are no Patient Instructions on file for this visit.     Segundo Vo MD    Assessment/Plan

## 2019-09-26 ENCOUNTER — CLINICAL SUPPORT NO REQUIREMENTS (OUTPATIENT)
Dept: CARDIOLOGY | Facility: CLINIC | Age: 59
End: 2019-09-26

## 2019-09-26 DIAGNOSIS — I49.5 SICK SINUS SYNDROME (HCC): ICD-10-CM

## 2019-09-26 PROCEDURE — 93296 REM INTERROG EVL PM/IDS: CPT | Performed by: PHYSICIAN ASSISTANT

## 2019-09-26 PROCEDURE — 93294 REM INTERROG EVL PM/LDLS PM: CPT | Performed by: PHYSICIAN ASSISTANT

## 2019-10-02 ENCOUNTER — TELEPHONE (OUTPATIENT)
Dept: INTERNAL MEDICINE | Facility: CLINIC | Age: 59
End: 2019-10-02

## 2019-10-04 RX ORDER — LEVOTHYROXINE SODIUM 0.1 MG/1
TABLET ORAL
Qty: 90 TABLET | Refills: 3 | Status: SHIPPED | OUTPATIENT
Start: 2019-10-04 | End: 2019-12-10 | Stop reason: SDUPTHER

## 2019-10-04 RX ORDER — LOVASTATIN 20 MG/1
TABLET ORAL
Qty: 90 TABLET | Refills: 3 | Status: SHIPPED | OUTPATIENT
Start: 2019-10-04 | End: 2020-10-29

## 2019-12-10 ENCOUNTER — OFFICE VISIT (OUTPATIENT)
Dept: INTERNAL MEDICINE | Facility: CLINIC | Age: 59
End: 2019-12-10

## 2019-12-10 VITALS
OXYGEN SATURATION: 98 % | DIASTOLIC BLOOD PRESSURE: 60 MMHG | HEIGHT: 66 IN | TEMPERATURE: 98.5 F | WEIGHT: 187.8 LBS | RESPIRATION RATE: 16 BRPM | HEART RATE: 70 BPM | BODY MASS INDEX: 30.18 KG/M2 | SYSTOLIC BLOOD PRESSURE: 122 MMHG

## 2019-12-10 DIAGNOSIS — R73.03 PREDIABETES: ICD-10-CM

## 2019-12-10 DIAGNOSIS — M54.50 ACUTE RIGHT-SIDED LOW BACK PAIN WITHOUT SCIATICA: ICD-10-CM

## 2019-12-10 DIAGNOSIS — M62.838 MUSCLE SPASM: ICD-10-CM

## 2019-12-10 DIAGNOSIS — L98.9 SKIN LESION OF FOOT: Primary | ICD-10-CM

## 2019-12-10 PROCEDURE — 99213 OFFICE O/P EST LOW 20 MIN: CPT | Performed by: INTERNAL MEDICINE

## 2019-12-10 RX ORDER — OMEPRAZOLE 20 MG/1
20 CAPSULE, DELAYED RELEASE ORAL DAILY
Qty: 90 CAPSULE | Refills: 3 | Status: SHIPPED | OUTPATIENT
Start: 2019-12-10 | End: 2021-03-12 | Stop reason: SDUPTHER

## 2019-12-10 RX ORDER — LEVOCETIRIZINE DIHYDROCHLORIDE 5 MG/1
5 TABLET, FILM COATED ORAL DAILY
Refills: 0 | COMMUNITY
Start: 2019-11-18

## 2019-12-10 RX ORDER — LEVOTHYROXINE SODIUM 0.1 MG/1
100 TABLET ORAL DAILY
Qty: 90 TABLET | Refills: 3 | Status: SHIPPED | OUTPATIENT
Start: 2019-12-10 | End: 2020-12-29

## 2019-12-10 RX ORDER — CYCLOBENZAPRINE HCL 10 MG
10 TABLET ORAL 3 TIMES DAILY PRN
Qty: 60 TABLET | Refills: 11 | Status: SHIPPED | OUTPATIENT
Start: 2019-12-10 | End: 2021-01-05 | Stop reason: SDUPTHER

## 2019-12-10 NOTE — PROGRESS NOTES
Subjective     Patient ID: Terry Mcguire is a 59 y.o. male. Patient is here for management of multiple medical problems.     Chief Complaint   Patient presents with   • Hypertension     3 month follow-up   • Hyperlipidemia     3 month follow-up   • Pain     patient having pain in his lower back, on the right side x 4 months   • Toe nail issues     patient states the toe nail on big toe of his right foot turned black x 2 months, patient states he does not remember injuring it.     History of Present Illness   Black lesiion under right toe nail on great toe.  X 3 month and not resolving.  No trauma.      Pain in right lower back.  Waxes and wanes x 2 month and now resolved. Will be back. Thought may be infection.  No otc meds.          The following portions of the patient's history were reviewed and updated as appropriate: allergies, current medications, past family history, past medical history, past social history, past surgical history and problem list.    Review of Systems   Constitutional: Positive for fatigue.   HENT: Negative for congestion, dental problem, ear discharge, ear pain and hearing loss.    Gastrointestinal: Positive for abdominal pain. Negative for blood in stool, constipation, diarrhea and nausea.   Skin: Positive for wound.   Psychiatric/Behavioral: Positive for sleep disturbance.   All other systems reviewed and are negative.      Current Outpatient Medications:   •  aspirin 81 MG tablet, Take  by mouth daily., Disp: , Rfl:   •  cyclobenzaprine (FLEXERIL) 10 MG tablet, Take 1 tablet by mouth 3 (Three) Times a Day As Needed for Muscle Spasms., Disp: 60 tablet, Rfl: 11  •  levocetirizine (XYZAL) 5 MG tablet, Take 5 mg by mouth Daily., Disp: , Rfl: 0  •  levothyroxine (SYNTHROID, LEVOTHROID) 100 MCG tablet, Take 1 tablet by mouth Daily., Disp: 90 tablet, Rfl: 3  •  lovastatin (MEVACOR) 20 MG tablet, TAKE 1 TABLET EVERY NIGHT, Disp: 90 tablet, Rfl: 3  •  Morphine (MS CONTIN) 30 MG 12 hr  "tablet, take 1 tablet by mouth two times a day, Disp: , Rfl: 0  •  omeprazole (priLOSEC) 20 MG capsule, Take 1 capsule by mouth Daily., Disp: 90 capsule, Rfl: 3  •  oxyCODONE (ROXICODONE) 10 MG tablet, Take  by mouth 2 (Two) Times a Day., Disp: , Rfl:     Objective      Blood pressure 122/60, pulse 70, temperature 98.5 °F (36.9 °C), temperature source Oral, resp. rate 16, height 167.6 cm (66\"), weight 85.2 kg (187 lb 12.8 oz), SpO2 98 %.    Physical Exam     General Appearance:    Alert, cooperative, no distress, appears stated age   Head:    Normocephalic, without obvious abnormality, atraumatic   Eyes:    PERRL, conjunctiva/corneas clear, EOM's intact   Ears:    Normal TM's and external ear canals, both ears   Nose:   Nares normal, septum midline, mucosa normal, no drainage   or sinus tenderness   Throat:   Lips, mucosa, and tongue normal; teeth and gums normal   Neck:   Supple, symmetrical, trachea midline, no adenopathy;        thyroid:  No enlargement/tenderness/nodules; no carotid    bruit or JVD   Back:     Symmetric, no curvature, ROM normal, no CVA tenderness   Lungs:     Clear to auscultation bilaterally, respirations unlabored   Chest wall:    No tenderness or deformity   Heart:    Regular rate and rhythm, S1 and S2 normal, no murmur,        rub or gallop   Abdomen:     Soft, non-tender, bowel sounds active all four quadrants,     no masses, no organomegaly   Extremities:   Extremities normal, atraumatic, no cyanosis or edema   Pulses:   2+ and symmetric all extremities   Skin:   Skin color, texture, turgor normal, no rashes or lesions   Lymph nodes:   Cervical, supraclavicular, and axillary nodes normal   Neurologic:   CNII-XII intact. Normal strength, sensation and reflexes       throughout      Results for orders placed or performed during the hospital encounter of 03/26/18   Stress Test With Myocardial Perfusion (1 Day)   Result Value Ref Range    Target HR (85%) 139 bpm    Max. Pred. HR (100%) 163 " bpm    BH CV STRESS PROTOCOL 1 Pharmacologic     Stage 1 1     HR Stage 1 72     Duration Min Stage 1 1     Stress Dose Regadenoson Stage 1 0.4     Stress Comments Stage 1 10 sec bolus injection     Stage 2 2     HR Stage 2 85     BP Stage 2 102/60     Duration Min Stage 2 1     Duration Sec Stage 2 0     Stage 3 3     HR Stage 3 82     BP Stage 3 120/60     Duration Min Stage 3 1     Stage 4 4     Duration Min Stage 4 1     Baseline HR 70 bpm    Baseline /80 mmHg    HR Stage 4 78     Peak HR 86 bpm    Percent Max Pred HR 52.76 %    Percent Target HR 62 %    Peak /80 mmHg    Recovery HR 75 bpm    Recovery /70 mmHg    Exercise duration (min) 4 min    Exercise duration (sec) 0 sec    Nuc Stress EF 77 %    Nuclear Prior Study 3          Assessment/Plan   bp stable. Labs stable.    eval toe for melanoma.          Terry was seen today for hypertension, hyperlipidemia, pain and toe nail issues.    Diagnoses and all orders for this visit:    Skin lesion of foot  -     Ambulatory Referral to Podiatry    Muscle spasm  -     cyclobenzaprine (FLEXERIL) 10 MG tablet; Take 1 tablet by mouth 3 (Three) Times a Day As Needed for Muscle Spasms.    Acute right-sided low back pain without sciatica  -     cyclobenzaprine (FLEXERIL) 10 MG tablet; Take 1 tablet by mouth 3 (Three) Times a Day As Needed for Muscle Spasms.        Other orders  -     omeprazole (priLOSEC) 20 MG capsule; Take 1 capsule by mouth Daily.  -     levothyroxine (SYNTHROID, LEVOTHROID) 100 MCG tablet; Take 1 tablet by mouth Daily.      Return in about 5 months (around 5/10/2020).          There are no Patient Instructions on file for this visit.     Segundo Vo MD    Assessment/Plan

## 2019-12-12 LAB
APPEARANCE UR: CLEAR
BACTERIA #/AREA URNS HPF: NORMAL /HPF
BACTERIA UR CULT: NO GROWTH
BACTERIA UR CULT: NORMAL
BILIRUB UR QL STRIP: NEGATIVE
CASTS URNS MICRO: NORMAL
COLOR UR: YELLOW
EPI CELLS #/AREA URNS HPF: NORMAL /HPF
GLUCOSE UR QL: NEGATIVE
HGB UR QL STRIP: NEGATIVE
KETONES UR QL STRIP: NEGATIVE
LEUKOCYTE ESTERASE UR QL STRIP: NEGATIVE
NITRITE UR QL STRIP: NEGATIVE
PH UR STRIP: 6.5 [PH] (ref 5–8)
PROT UR QL STRIP: NEGATIVE
RBC #/AREA URNS HPF: NORMAL /HPF
SP GR UR: 1.01 (ref 1–1.03)
UROBILINOGEN UR STRIP-MCNC: NORMAL MG/DL
WBC #/AREA URNS HPF: NORMAL /HPF

## 2019-12-26 ENCOUNTER — CLINICAL SUPPORT NO REQUIREMENTS (OUTPATIENT)
Dept: CARDIOLOGY | Facility: CLINIC | Age: 59
End: 2019-12-26

## 2019-12-26 DIAGNOSIS — I49.5 SICK SINUS SYNDROME (HCC): Primary | ICD-10-CM

## 2019-12-26 DIAGNOSIS — R00.1 SINUS BRADYCARDIA: ICD-10-CM

## 2019-12-26 DIAGNOSIS — I63.9 CEREBROVASCULAR ACCIDENT (CVA), UNSPECIFIED MECHANISM (HCC): ICD-10-CM

## 2019-12-26 PROCEDURE — 93294 REM INTERROG EVL PM/LDLS PM: CPT | Performed by: INTERNAL MEDICINE

## 2019-12-26 PROCEDURE — 93296 REM INTERROG EVL PM/IDS: CPT | Performed by: INTERNAL MEDICINE

## 2020-02-25 LAB
ALBUMIN SERPL-MCNC: 4 G/DL (ref 3.5–5.2)
ALBUMIN/GLOB SERPL: 1.7 G/DL
ALP SERPL-CCNC: 85 U/L (ref 39–117)
ALT SERPL-CCNC: 13 U/L (ref 1–41)
AST SERPL-CCNC: 18 U/L (ref 1–40)
BASOPHILS # BLD AUTO: 0.08 10*3/MM3 (ref 0–0.2)
BASOPHILS NFR BLD AUTO: 1 % (ref 0–1.5)
BILIRUB SERPL-MCNC: 0.2 MG/DL (ref 0.2–1.2)
BUN SERPL-MCNC: 21 MG/DL (ref 6–20)
BUN/CREAT SERPL: 15.3 (ref 7–25)
CALCIUM SERPL-MCNC: 9.2 MG/DL (ref 8.6–10.5)
CHLORIDE SERPL-SCNC: 103 MMOL/L (ref 98–107)
CHOLEST SERPL-MCNC: 179 MG/DL (ref 0–200)
CO2 SERPL-SCNC: 24.7 MMOL/L (ref 22–29)
CREAT SERPL-MCNC: 1.37 MG/DL (ref 0.76–1.27)
EOSINOPHIL # BLD AUTO: 0.27 10*3/MM3 (ref 0–0.4)
EOSINOPHIL NFR BLD AUTO: 3.5 % (ref 0.3–6.2)
ERYTHROCYTE [DISTWIDTH] IN BLOOD BY AUTOMATED COUNT: 12.6 % (ref 12.3–15.4)
GLOBULIN SER CALC-MCNC: 2.3 GM/DL
GLUCOSE SERPL-MCNC: 113 MG/DL (ref 65–99)
HCT VFR BLD AUTO: 44.7 % (ref 37.5–51)
HDLC SERPL-MCNC: 41 MG/DL (ref 40–60)
HGB BLD-MCNC: 15.4 G/DL (ref 13–17.7)
IMM GRANULOCYTES # BLD AUTO: 0.02 10*3/MM3 (ref 0–0.05)
IMM GRANULOCYTES NFR BLD AUTO: 0.3 % (ref 0–0.5)
LDLC SERPL CALC-MCNC: 103 MG/DL (ref 0–100)
LYMPHOCYTES # BLD AUTO: 2.26 10*3/MM3 (ref 0.7–3.1)
LYMPHOCYTES NFR BLD AUTO: 29.1 % (ref 19.6–45.3)
MCH RBC QN AUTO: 31.7 PG (ref 26.6–33)
MCHC RBC AUTO-ENTMCNC: 34.5 G/DL (ref 31.5–35.7)
MCV RBC AUTO: 92 FL (ref 79–97)
MONOCYTES # BLD AUTO: 0.8 10*3/MM3 (ref 0.1–0.9)
MONOCYTES NFR BLD AUTO: 10.3 % (ref 5–12)
NEUTROPHILS # BLD AUTO: 4.34 10*3/MM3 (ref 1.7–7)
NEUTROPHILS NFR BLD AUTO: 55.8 % (ref 42.7–76)
NRBC BLD AUTO-RTO: 0 /100 WBC (ref 0–0.2)
PLATELET # BLD AUTO: 227 10*3/MM3 (ref 140–450)
POTASSIUM SERPL-SCNC: 4.8 MMOL/L (ref 3.5–5.2)
PROT SERPL-MCNC: 6.3 G/DL (ref 6–8.5)
PSA SERPL-MCNC: 0.58 NG/ML (ref 0–4)
RBC # BLD AUTO: 4.86 10*6/MM3 (ref 4.14–5.8)
SODIUM SERPL-SCNC: 141 MMOL/L (ref 136–145)
T4 FREE SERPL-MCNC: 1.64 NG/DL (ref 0.93–1.7)
TRIGL SERPL-MCNC: 174 MG/DL (ref 0–150)
TSH SERPL DL<=0.005 MIU/L-ACNC: 0.46 UIU/ML (ref 0.27–4.2)
VIT B12 SERPL-MCNC: 600 PG/ML (ref 211–946)
VLDLC SERPL CALC-MCNC: 34.8 MG/DL
WBC # BLD AUTO: 7.77 10*3/MM3 (ref 3.4–10.8)

## 2020-03-03 NOTE — PROGRESS NOTES
Subjective:     Encounter Date:03/12/2020 - [place] [facility]     Patient ID: Terry Mcguire is a 59 y.o.  white male, retired/disabled , and resident of [place], [place].     INTERNIST:  Segundo Vo MD  [facility] CARDIOLOGIST:  Segundo Duran MD, [facility]  NEUROLOGIST:  Shukri Taylor MD  PAIN SPECIALIST:  [facility]    Chief Complaint:   Chief Complaint   Patient presents with   • Sick sinus syndrome     Problem List:  1. Symptomatic bradycardia:  a. Status post [employer] pacemaker implantation in 2006.  b. Subsequent [employer] Accent DR/RF 2210 pacemaker implant, 05/22/2013  c. Abnormal interrogation with intermittent atrial tachycardias and nonsustained ventricular tachycardia, March 2018, with normal nuclear stress test  d. Abnormal acceptable pacemaker interrogation, April 2019  2. Benign hypertension.  3. Hypercholesterolemia.  4.  Chest pain syndrome:  a. Abnormal Cardiolite, 09/26/2006, with inferior  wall defect.  b. Normal cardiac catheterization, 10/06/2006; normal EF.  c. Acceptable negative IV Lexiscan Cardiolite stress study suggestive of low probability for significant focal obstructive coronary artery disease with preserved systolic left ventricular function (LVEF 0.77), March 2018.  5. Ongoing tobacco abuse with chronic obstructive pulmonary disease (1.5 PPD)  6. Obstructive sleep apnea, with CPAP.  7. Seasonal allergies/rhinitis.  8. Osteoarthritis with chronic pain issues.  9. Gastroesophageal reflux disease with hiatal hernia.  10. Hypothyroidism  on chronic supplementation.  11. Essential tremors.  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  13. Mild obesity, BMI 30.3  14. Family history of hypertension.  15. Surgical history:  a. Pacemaker implantation.  b. Left ankle surgery.  c. Multiple colonoscopies.  d. Herniorrhaphy.  e. Bilateral elbow surgery.  f. Bilateral carpal tunnel release.     No Known Allergies    Current  "Outpatient Medications:   •  aspirin 81 MG tablet, Take  by mouth daily., Disp: , Rfl:   •  cyclobenzaprine (FLEXERIL) 10 MG tablet, Take 1 tablet by mouth 3 (Three) Times a Day As Needed for Muscle Spasms., Disp: 60 tablet, Rfl: 11  •  levocetirizine (XYZAL) 5 MG tablet, Take 5 mg by mouth Daily., Disp: , Rfl: 0  •  levothyroxine (SYNTHROID, LEVOTHROID) 100 MCG tablet, Take 1 tablet by mouth Daily., Disp: 90 tablet, Rfl: 3  •  lovastatin (MEVACOR) 20 MG tablet, TAKE 1 TABLET EVERY NIGHT, Disp: 90 tablet, Rfl: 3  •  Morphine (MS CONTIN) 30 MG 12 hr tablet, take 1 tablet by mouth two times a day, Disp: , Rfl: 0  •  omeprazole (priLOSEC) 20 MG capsule, Take 1 capsule by mouth Daily., Disp: 90 capsule, Rfl: 3  •  oxyCODONE (ROXICODONE) 10 MG tablet, Take  by mouth 2 (Two) Times a Day., Disp: , Rfl:     History of Present Illness: Patient returns for scheduled 11-month followup. The patient is found to have a low-grade fever today, and precautionary measures are taken due to Covid-19.  He did not get a flu shot in the fall \"because I always get sick.\"  He is assured that he cannot catch the flu by getting vaccinated.  He continues to smoke a pack a day, down from 1.5 packs per day.  Complete smoking cessation is strongly recommended.  He has pursued a fairly stable course over the interim period without hospitalizations, ED visits, prolonged antibiotics, or surgery.  He is quite upset about his pain medication doses and feels that, on the current doses, his pain is not well controlled; he continues to follow with a pain physician in [place].  He has had occasional nocturnal chest pressure, relieved by antacids, that he attributes to reflux, particularly when he eats food with tomato sauce.  He is fairly sedentary and inactive and helps care for his invalid wife, who is also a heavy smoker.  Patient otherwise denies chest pain, shortness of breath, PND, edema, palpitations, syncope or presyncope at this time on limited " "activity.        Review of Systems   Respiratory: Positive for wheezing.    Hematologic/Lymphatic: Bruises/bleeds easily.   Musculoskeletal: Positive for arthritis, back pain and muscle weakness.   Gastrointestinal: Positive for heartburn.      Obtained and negative except as outlined in problem list and HPI.    Procedures [employer] CHAPARRO 2210 - mode DDDR  (implant date 05/22/2013) - RA paced 84%, RV paced 1.8%.  P wave 4.6 mV, R wave >12 mV.   Nominal threshold and impedance for both atrial and ventricular leads.  Battery voltage 2.89 volts.  CHRISTIAN 5.2-5.9 years.  Underlying rhythm sinus, 68 bpm.  High ventricular rates x12, all 1:1 AV conduction with AMS <1% and the longest episode lasting 36 minutes and 6 seconds.   Home monitor in place with active transmission.  Abnormal acceptable interrogation without reprogramming.       Objective:       Vitals:    03/12/20 1124 03/12/20 1128   BP: 142/80 136/76   BP Location: Left arm Left arm   Patient Position: Sitting Standing   Pulse: 70    Temp: 99.5 °F (37.5 °C)    SpO2: 96%    Weight: 84.4 kg (186 lb)    Height: 170.2 cm (67\")      Body mass index is 29.13 kg/m².   Last weight:  192 lbs.    Physical Exam   Constitutional: He is oriented to person, place, and time. He appears well-developed and well-nourished.   Neck: No JVD present. Carotid bruit is not present. No thyromegaly present.   Cardiovascular: Regular rhythm, S1 normal, S2 normal and normal heart sounds. Exam reveals no gallop, no S3 and no friction rub.   No murmur heard.  Pulses:       Carotid pulses are 1+ on the right side, and 1+ on the left side.       Radial pulses are 1+ on the right side, and 1+ on the left side.        Femoral pulses are 1+ on the right side, and 1+ on the left side.       Popliteal pulses are 1+ on the right side, and 1+ on the left side.        Dorsalis pedis pulses are 1+ on the right side, and 1+ on the left side.        Posterior tibial pulses are 1+ on the right " side, and 1+ on the left side.   Pulmonary/Chest: Effort normal. He has decreased breath sounds. He has no wheezes. He has no rhonchi. He has no rales.   Left precordial pacemaker site is nominal   Abdominal: Soft. He exhibits no mass. There is no hepatosplenomegaly. There is no tenderness. There is no guarding.   Bowel sounds audible x4   Musculoskeletal: Normal range of motion. He exhibits no edema.   Lymphadenopathy:     He has no cervical adenopathy.   Neurological: He is alert and oriented to person, place, and time.   Skin: Skin is warm, dry and intact. No rash noted.   Strong tobacco smell   Vitals reviewed.        Lab Review:   09/30/2019, chest CT:  1. Mild emphysema  2. Stable right lower lobe pulmonary nodules  3. ASCVD, thoracoabdominal aorta and coronary arteries, dual lead pacemaker  4. Other findings as noted, unchanged      Lab Results   Component Value Date    BUN 21 (H) 02/25/2020    CREATININE 1.37 (H) 02/25/2020    EGFRIFNONA 53 (L) 02/25/2020    EGFRIFAFRI 64 02/25/2020    BCR 15.3 02/25/2020    CO2 24.7 02/25/2020    CALCIUM 9.2 02/25/2020    PROTENTOTREF 6.3 02/25/2020    ALBUMIN 4.00 02/25/2020    LABIL2 1.7 02/25/2020    AST 18 02/25/2020    ALT 13 02/25/2020   Sodium - 141  Potassium - 4.8  Chloride - 103  Glucose  - 113    Vitamin B12 - 600   PSA - 0.579    Lab Results   Component Value Date    WBC 7.77 02/25/2020    HGB 15.4 02/25/2020    HCT 44.7 02/25/2020    MCV 92.0 02/25/2020     02/25/2020       Lab Results   Component Value Date    TSH 0.461 02/25/2020   Free T4 - 1.64    Lab Results   Component Value Date    TRIG 174 (H) 02/25/2020    TRIG 219 (H) 05/22/2017     Lab Results   Component Value Date    HDL 41 02/25/2020    HDL 45 05/22/2017   Total cholesterol - 179  LDL cholesterol - 103        Assessment:       Overall continued acceptable course with no new interim cardiopulmonary complaints with marginal stable functional status. We will defer additional diagnostic or  therapeutic intervention from a cardiac perspective at this time.     Diagnosis Plan   1. Benign hypertension  Acceptable control; Continue current treatment.    2. Sick sinus syndrome (CMS/HCC)  Overall acceptable pacemaker function; defer additional therapy at this time in view of rare intermittent atrial tachycardia.   3. NETTIE on CPAP  Compliance stressed   4. Tobacco abuse  I advised the patient of the risks of continuing to use tobacco, and I provided this patient with smoking cessation educational materials and discussed how to quit smoking and patient has expressed no willingness to quit.  Counseled patient for 3-5 minutes     5. Dyslipidemia  Fair control; continue heart healthy diet and lovastatin          Plan:         1. Patient to continue current medications and close follow up with the above providers.  2. Influenza immunization is strongly recommended.  3. Tentative cardiology follow up in November or December 2020, or patient may return sooner PRN.    Transcribed by Silvana David for Dr. Noam Foster at 9:08 AM on 03/12/2020    INoam MD, [facility], personally performed the services described in this documentation as scribed by the above named individual in my presence, and it is both accurate and complete. At 11:43 AM on 03/12/2020

## 2020-03-12 ENCOUNTER — OFFICE VISIT (OUTPATIENT)
Dept: CARDIOLOGY | Facility: CLINIC | Age: 60
End: 2020-03-12

## 2020-03-12 VITALS
BODY MASS INDEX: 29.19 KG/M2 | HEART RATE: 70 BPM | WEIGHT: 186 LBS | HEIGHT: 67 IN | DIASTOLIC BLOOD PRESSURE: 76 MMHG | SYSTOLIC BLOOD PRESSURE: 136 MMHG | OXYGEN SATURATION: 96 % | TEMPERATURE: 99.5 F

## 2020-03-12 DIAGNOSIS — I10 BENIGN HYPERTENSION: Primary | ICD-10-CM

## 2020-03-12 DIAGNOSIS — Z99.89 OSA ON CPAP: ICD-10-CM

## 2020-03-12 DIAGNOSIS — G47.33 OSA ON CPAP: ICD-10-CM

## 2020-03-12 DIAGNOSIS — I49.5 SICK SINUS SYNDROME (HCC): ICD-10-CM

## 2020-03-12 DIAGNOSIS — E78.5 DYSLIPIDEMIA: ICD-10-CM

## 2020-03-12 DIAGNOSIS — Z72.0 TOBACCO ABUSE: ICD-10-CM

## 2020-03-12 PROCEDURE — 99214 OFFICE O/P EST MOD 30 MIN: CPT | Performed by: INTERNAL MEDICINE

## 2020-03-12 PROCEDURE — 93288 INTERROG EVL PM/LDLS PM IP: CPT | Performed by: INTERNAL MEDICINE

## 2020-08-25 ENCOUNTER — TELEPHONE (OUTPATIENT)
Dept: INTERNAL MEDICINE | Facility: CLINIC | Age: 60
End: 2020-08-25

## 2020-10-29 RX ORDER — LOVASTATIN 20 MG/1
TABLET ORAL
Qty: 90 TABLET | Refills: 3 | Status: SHIPPED | OUTPATIENT
Start: 2020-10-29 | End: 2021-08-25

## 2020-11-12 ENCOUNTER — OFFICE VISIT (OUTPATIENT)
Dept: CARDIOLOGY | Facility: CLINIC | Age: 60
End: 2020-11-12

## 2020-11-12 VITALS
WEIGHT: 190 LBS | SYSTOLIC BLOOD PRESSURE: 132 MMHG | HEIGHT: 66 IN | TEMPERATURE: 96.4 F | HEART RATE: 88 BPM | BODY MASS INDEX: 30.53 KG/M2 | DIASTOLIC BLOOD PRESSURE: 70 MMHG

## 2020-11-12 DIAGNOSIS — I10 BENIGN HYPERTENSION: Primary | ICD-10-CM

## 2020-11-12 DIAGNOSIS — I49.5 SICK SINUS SYNDROME (HCC): ICD-10-CM

## 2020-11-12 DIAGNOSIS — E78.5 DYSLIPIDEMIA: ICD-10-CM

## 2020-11-12 DIAGNOSIS — Z72.0 TOBACCO ABUSE: ICD-10-CM

## 2020-11-12 DIAGNOSIS — Z99.89 OSA ON CPAP: ICD-10-CM

## 2020-11-12 DIAGNOSIS — G47.33 OSA ON CPAP: ICD-10-CM

## 2020-11-12 PROCEDURE — 99214 OFFICE O/P EST MOD 30 MIN: CPT | Performed by: INTERNAL MEDICINE

## 2020-11-12 PROCEDURE — 93288 INTERROG EVL PM/LDLS PM IP: CPT | Performed by: INTERNAL MEDICINE

## 2020-11-12 NOTE — PROGRESS NOTES
Subjective:     Encounter Date:11/12/2020    Patient ID: Terry Mcguire is a 60 y.o.  white male, retired/disabled , and resident of [place], [place].     INTERNIST:  Segundo Vo MD  Counts include 234 beds at the [facility] CARDIOLOGIST:  Segundo Duran MD, [facility]  NEUROLOGIST:  Shukri Taylor MD  PAIN SPECIALIST:  [facility]    Chief Complaint:   Chief Complaint   Patient presents with   • Benign hypertension       Problem List:  1. Symptomatic bradycardia:  a. Status post [employer] pacemaker implantation in 2006.  b. Subsequent [employer] Bishop Accent DR/RF 2210 pacemaker implant, 05/22/2013  c. Abnormal interrogation with intermittent atrial tachycardias and nonsustained ventricular tachycardia, March 2018, with normal nuclear stress test  d. Abnormal acceptable pacemaker interrogation, April 2019, November 2020  2. Benign hypertension.  3. Hypercholesterolemia.  4.  Chest pain syndrome:  a. Abnormal Cardiolite, 09/26/2006, with inferior  wall defect.  b. Normal cardiac catheterization, 10/06/2006; normal EF.  c. Acceptable negative IV Lexiscan Cardiolite stress study suggestive of low probability for significant focal obstructive coronary artery disease with preserved systolic left ventricular function (LVEF 0.77), March 2018.  5. Ongoing tobacco abuse with chronic obstructive pulmonary disease (1.5 PPD)  6. Obstructive sleep apnea, with CPAP.  7. Seasonal allergies/rhinitis.  8. Osteoarthritis with chronic pain issues.  9. Gastroesophageal reflux disease with hiatal hernia.  10. Hypothyroidism  on chronic supplementation.  11. Essential tremors.  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  13. Mild obesity, BMI 30.67  14. Family history of hypertension.  15. Surgical history:  a. Pacemaker implantation.  b. Left ankle surgery.  c. Multiple colonoscopies.  d. Herniorrhaphy.  e. Bilateral elbow surgery.  f. Bilateral carpal tunnel release.    No Known Allergies    Current Outpatient  Medications:   •  aspirin 81 MG tablet, Take  by mouth daily., Disp: , Rfl:   •  cyclobenzaprine (FLEXERIL) 10 MG tablet, Take 1 tablet by mouth 3 (Three) Times a Day As Needed for Muscle Spasms., Disp: 60 tablet, Rfl: 11  •  levocetirizine (XYZAL) 5 MG tablet, Take 5 mg by mouth Daily., Disp: , Rfl: 0  •  levothyroxine (SYNTHROID, LEVOTHROID) 100 MCG tablet, Take 1 tablet by mouth Daily., Disp: 90 tablet, Rfl: 3  •  lovastatin (MEVACOR) 20 MG tablet, TAKE 1 TABLET EVERY NIGHT, Disp: 90 tablet, Rfl: 3  •  Morphine (MS CONTIN) 30 MG 12 hr tablet, take 1 tablet by mouth two times a day, Disp: , Rfl: 0  •  omeprazole (priLOSEC) 20 MG capsule, Take 1 capsule by mouth Daily., Disp: 90 capsule, Rfl: 3  •  oxyCODONE (ROXICODONE) 10 MG tablet, Take  by mouth 2 (Two) Times a Day., Disp: , Rfl:     History of Present Illness: Patient returns for scheduled 8 month follow up. Since last visit patient has felt fair overall despite having severe back pain due to his CMT. He was offered a cortisone shot for his pain but he has refused the treatment due to struggles his wife experienced in the past. He is checked every two month at the pain clinic. Patient otherwise denies chest pain, shortness of breath, PND, edema, palpitations, syncope or presyncope at this time. He is still smoking as of today, and says that he cannot quit smoking unless his wife quits as well. He stays active by walking every day and going outside with his dogs. He has not had influenza immunization this fall. He has had no interim ER visits, hospitalizations, serious illnesses, or surgeries.         ROS   Obtained and negative except as outlined in problem list and HPI.    Procedures  [employer] Accent DRRF 2210 DDDR 70  AP 79%   1.3%  P wave 4.0 mv  R wave >12.0 mv  Nominal atrial ventricular threshold and impedance   Battery voltage 2.86 V  CHRISTIAN 4.1-4.6 years  Underlying rhythm Normal sinus 60s  Not pacemaker dependent  Home monitor in place and  "transmitting  600 mode switches (longest 16 minutes,<1%)     Objective:       Vitals:    11/12/20 1414 11/12/20 1416   BP: 140/70 132/70   BP Location: Right arm Right arm   Patient Position: Sitting Standing   Pulse: 80 88   Temp: 96.4 °F (35.8 °C)    Weight: 86.2 kg (190 lb)    Height: 167.6 cm (66\")      Body mass index is 30.67 kg/m².  Last weight: 186 lbs    Vitals signs reviewed.   Constitutional:       Appearance: Well-developed.   Neck:      Thyroid: No thyromegaly.      Vascular: No carotid bruit or JVD.      Lymphadenopathy: No cervical adenopathy.   Pulmonary:      Effort: Pulmonary effort is normal.      Breath sounds: Decreased breath sounds present. No wheezing. No rhonchi. No rales.   Chest:      Comments: Left precordial pacemaker site is nominal  Cardiovascular:      Regular rhythm.      Murmurs: There is a grade 2/6 mid frequency early systolic murmur at the LLSB.      No gallop. No S3 gallop.   Pulses:     Dorsalis pedis: 2+ bilaterally.     Posterior tibial: 2+ bilaterally.  Edema:     Peripheral edema absent.   Abdominal:      Palpations: Abdomen is soft. There is no abdominal mass.      Tenderness: There is no abdominal tenderness. There is no guarding.   Musculoskeletal: Normal range of motion.   Skin:     General: Skin is warm and dry.      Findings: No rash.      Comments: Strong tobacco smell   Neurological:      Mental Status: Alert and oriented to person, place, and time.           Lab Review:   Lab Results   Component Value Date    BUN 21 (H) 02/25/2020    CREATININE 1.37 (H) 02/25/2020    EGFRIFNONA 53 (L) 02/25/2020    EGFRIFAFRI 64 02/25/2020    BCR 15.3 02/25/2020     02/25/2020    K 4.8 02/25/2020     02/25/2020    CO2 24.7 02/25/2020    CALCIUM 9.2 02/25/2020    PROTENTOTREF 6.3 02/25/2020    ALBUMIN 4.00 02/25/2020    LABIL2 1.7 02/25/2020    AST 18 02/25/2020    ALT 13 02/25/2020       Lab Results   Component Value Date    WBC 7.77 02/25/2020    HGB 15.4 02/25/2020    " HCT 44.7 02/25/2020    MCV 92.0 02/25/2020     02/25/2020       Lab Results   Component Value Date    TSH 0.461 02/25/2020       Lab Results   Component Value Date    TRIG 174 (H) 02/25/2020    TRIG 219 (H) 05/22/2017     Lab Results   Component Value Date    HDL 41 02/25/2020    HDL 45 05/22/2017     Lab Results   Component Value Date     (H) 02/25/2020    LDL 79 05/22/2017     CT chest without contrast, 09/30/19:  1. Mild emphysema  2. Stable right lower lobe pulmonary nodules  3. ASCVD, thoracoabdominal aorta and coronary arteries. Dual lead pacemaker  4. Other findings as noted, unchanged        Assessment:       Overall continued acceptable course with no new interim cardiopulmonary complaints with good functional status. We will defer additional diagnostic or therapeutic intervention from a cardiac perspective at this time.     Diagnosis Plan   1. Benign hypertension  Acceptable control; Continue current treatment.    2. Sick sinus syndrome (CMS/HCC)  Overall acceptable pacemaker function; defer additional therapy at this time in view of rare intermittent atrial tachycardia.   3. NETTIE on CPAP  Compliance stressed   4. Tobacco abuse  Complete cessation strongly encouraged.   5. Dyslipidemia  Suboptimal control; continue heart healthy diet and lovastatin.          Plan:         1. Patient to continue current medications and close follow up with the above providers.  2. Tentative cardiology follow up in May 2021 or patient may return sooner PRN.  3. Influenza immunization encouraged.    Scribed for Noam Foster MD by Derik Kohler 11/12/2020  14:36 EST      I, Noam Foster MD, [facility], personally performed the services described in this documentation as scribed by the above named individual in my presence, and it is both accurate and complete. At 14:44 EST on 11/12/2020

## 2020-12-29 ENCOUNTER — TELEPHONE (OUTPATIENT)
Dept: INTERNAL MEDICINE | Facility: CLINIC | Age: 60
End: 2020-12-29

## 2020-12-29 RX ORDER — LEVOTHYROXINE SODIUM 0.1 MG/1
TABLET ORAL
Qty: 90 TABLET | Refills: 3 | Status: SHIPPED | OUTPATIENT
Start: 2020-12-29 | End: 2021-04-22 | Stop reason: SDUPTHER

## 2020-12-29 NOTE — TELEPHONE ENCOUNTER
PATIENT IS REQUESTING A CALL BACK TO DISCUSS GETTING LAB ORDERS PRIOR TO HIS UP COMING APPOINTMENT THAT IS SCHEDULED 1/5/2021    CALL BACK NUMBER [phone]

## 2020-12-29 NOTE — TELEPHONE ENCOUNTER
LOV was 12/10/2019, no show on 05/11/2020. Patient requesting refill on levothyroxine. Called patient to schedule a follow up, left patient a message to call back to schedule an appointment. IF patient calls back please get patient scheduled.  Hub may deliver message.

## 2020-12-30 DIAGNOSIS — E78.00 HYPERCHOLESTEREMIA: ICD-10-CM

## 2020-12-30 DIAGNOSIS — Z12.5 PROSTATE CANCER SCREENING: Primary | ICD-10-CM

## 2020-12-30 DIAGNOSIS — Z00.00 ROUTINE GENERAL MEDICAL EXAMINATION AT A HEALTH CARE FACILITY: ICD-10-CM

## 2021-01-01 PROCEDURE — 93294 REM INTERROG EVL PM/LDLS PM: CPT | Performed by: INTERNAL MEDICINE

## 2021-01-01 PROCEDURE — 93296 REM INTERROG EVL PM/IDS: CPT | Performed by: INTERNAL MEDICINE

## 2021-01-05 ENCOUNTER — OFFICE VISIT (OUTPATIENT)
Dept: INTERNAL MEDICINE | Facility: CLINIC | Age: 61
End: 2021-01-05

## 2021-01-05 DIAGNOSIS — R35.0 URINARY FREQUENCY: ICD-10-CM

## 2021-01-05 DIAGNOSIS — E03.9 ACQUIRED HYPOTHYROIDISM: ICD-10-CM

## 2021-01-05 DIAGNOSIS — M62.838 MUSCLE SPASM: ICD-10-CM

## 2021-01-05 DIAGNOSIS — G60.0 CHARCOT-MARIE-TOOTH DISEASE: ICD-10-CM

## 2021-01-05 DIAGNOSIS — N18.2 CRI (CHRONIC RENAL INSUFFICIENCY), STAGE 2 (MILD): Primary | ICD-10-CM

## 2021-01-05 DIAGNOSIS — M54.50 ACUTE RIGHT-SIDED LOW BACK PAIN WITHOUT SCIATICA: ICD-10-CM

## 2021-01-05 DIAGNOSIS — E78.00 HYPERCHOLESTEROLEMIA: ICD-10-CM

## 2021-01-05 DIAGNOSIS — G60.0 HEREDITARY SENSORIMOTOR NEUROPATHY: ICD-10-CM

## 2021-01-05 PROCEDURE — 99213 OFFICE O/P EST LOW 20 MIN: CPT | Performed by: INTERNAL MEDICINE

## 2021-01-05 RX ORDER — TOPIRAMATE 25 MG/1
25 TABLET ORAL 2 TIMES DAILY
COMMUNITY
Start: 2020-11-30 | End: 2021-06-10

## 2021-01-05 RX ORDER — CYCLOBENZAPRINE HCL 10 MG
10 TABLET ORAL 4 TIMES DAILY PRN
Qty: 120 TABLET | Refills: 11 | Status: SHIPPED | OUTPATIENT
Start: 2021-01-05 | End: 2021-01-05 | Stop reason: SDUPTHER

## 2021-01-05 RX ORDER — CYCLOBENZAPRINE HCL 10 MG
10 TABLET ORAL 4 TIMES DAILY PRN
Qty: 120 TABLET | Refills: 11 | Status: SHIPPED | OUTPATIENT
Start: 2021-01-05 | End: 2022-01-01

## 2021-01-05 NOTE — PROGRESS NOTES
Subjective     Patient ID: Terry Mcguire is a 60 y.o. male. Patient is here for management of multiple medical problems.     Chief Complaint   Patient presents with   • Hypertension   • Sleep Apnea     History of Present Illness       htn      sol      Hypothyroid      Hyperholest      CMT  Worsening muscle spasm. Needs increase in meds.      Increase urin frequency    The following portions of the patient's history were reviewed and updated as appropriate: allergies, current medications, past family history, past medical history, past social history, past surgical history and problem list.    Review of Systems   Constitutional: Positive for fatigue.   Musculoskeletal: Positive for arthralgias, back pain and gait problem.       Current Outpatient Medications:   •  aspirin 81 MG tablet, Take  by mouth daily., Disp: , Rfl:   •  cyclobenzaprine (FLEXERIL) 10 MG tablet, Take 1 tablet by mouth 3 (Three) Times a Day As Needed for Muscle Spasms., Disp: 60 tablet, Rfl: 11  •  levocetirizine (XYZAL) 5 MG tablet, Take 5 mg by mouth Daily., Disp: , Rfl: 0  •  levothyroxine (SYNTHROID, LEVOTHROID) 100 MCG tablet, TAKE 1 TABLET DAILY. PATIENT NEEDS FOLLOW UP APPT FOR FURTHER REFILLS., Disp: 90 tablet, Rfl: 3  •  lovastatin (MEVACOR) 20 MG tablet, TAKE 1 TABLET EVERY NIGHT, Disp: 90 tablet, Rfl: 3  •  Morphine (MS CONTIN) 30 MG 12 hr tablet, take 1 tablet by mouth two times a day, Disp: , Rfl: 0  •  omeprazole (priLOSEC) 20 MG capsule, Take 1 capsule by mouth Daily., Disp: 90 capsule, Rfl: 3  •  oxyCODONE (ROXICODONE) 10 MG tablet, Take  by mouth 2 (Two) Times a Day., Disp: , Rfl:   •  topiramate (TOPAMAX) 25 MG tablet, Take 25 mg by mouth 2 (two) times a day., Disp: , Rfl:     Objective      There were no vitals taken for this visit.    Physical Exam     General Appearance:    Alert, cooperative, no distress, appears stated age   Head:    Normocephalic, without obvious abnormality, atraumatic   Eyes:     Ears:     Nose:     Throat:    Neck:    Back:      Lungs:      Chest wall:     Heart:     Abdomen:      Extremities:    Pulses:    Skin:    Lymph nodes:    Neurologic:       Results for orders placed or performed in visit on 12/30/20   Lipid Panel    Specimen: Blood   Result Value Ref Range    Total Cholesterol 164 0 - 200 mg/dL    Triglycerides 158 (H) 0 - 150 mg/dL    HDL Cholesterol 39 (L) 40 - 60 mg/dL    VLDL Cholesterol Darwin 28 5 - 40 mg/dL    LDL Chol Calc (NIH) 97 0 - 100 mg/dL   PSA Screen    Specimen: Blood   Result Value Ref Range    PSA 0.424 0.000 - 4.000 ng/mL   Comprehensive Metabolic Panel    Specimen: Blood   Result Value Ref Range    Glucose 109 (H) 65 - 99 mg/dL    BUN 28 (H) 8 - 23 mg/dL    Creatinine 1.37 (H) 0.76 - 1.27 mg/dL    eGFR Non African Am 53 (L) >60 mL/min/1.73    eGFR African Am 64 >60 mL/min/1.73    BUN/Creatinine Ratio 20.4 7.0 - 25.0    Sodium 141 136 - 145 mmol/L    Potassium 4.6 3.5 - 5.2 mmol/L    Chloride 108 (H) 98 - 107 mmol/L    Total CO2 25.5 22.0 - 29.0 mmol/L    Calcium 9.1 8.6 - 10.5 mg/dL    Total Protein 6.4 6.0 - 8.5 g/dL    Albumin 4.20 3.50 - 5.20 g/dL    Globulin 2.2 gm/dL    A/G Ratio 1.9 g/dL    Total Bilirubin 0.2 0.0 - 1.2 mg/dL    Alkaline Phosphatase 97 39 - 117 U/L    AST (SGOT) 21 1 - 40 U/L    ALT (SGPT) 15 1 - 41 U/L   Vitamin B12    Specimen: Blood   Result Value Ref Range    Vitamin B-12 632 211 - 946 pg/mL   TSH    Specimen: Blood   Result Value Ref Range    TSH 0.358 0.270 - 4.200 uIU/mL   T4, Free    Specimen: Blood   Result Value Ref Range    Free T4 1.50 0.93 - 1.70 ng/dL   Vitamin B6    Specimen: Blood   Result Value Ref Range    Vitamin B6     CBC & Differential    Specimen: Blood   Result Value Ref Range    WBC 6.98 3.40 - 10.80 10*3/mm3    RBC 5.32 4.14 - 5.80 10*6/mm3    Hemoglobin 16.4 13.0 - 17.7 g/dL    Hematocrit 48.5 37.5 - 51.0 %    MCV 91.2 79.0 - 97.0 fL    MCH 30.8 26.6 - 33.0 pg    MCHC 33.8 31.5 - 35.7 g/dL    RDW 13.0 12.3 - 15.4 %    Platelets 207  140 - 450 10*3/mm3    Neutrophil Rel % 52.2 42.7 - 76.0 %    Lymphocyte Rel % 33.0 19.6 - 45.3 %    Monocyte Rel % 9.7 5.0 - 12.0 %    Eosinophil Rel % 3.9 0.3 - 6.2 %    Basophil Rel % 1.1 0.0 - 1.5 %    Neutrophils Absolute 3.64 1.70 - 7.00 10*3/mm3    Lymphocytes Absolute 2.30 0.70 - 3.10 10*3/mm3    Monocytes Absolute 0.68 0.10 - 0.90 10*3/mm3    Eosinophils Absolute 0.27 0.00 - 0.40 10*3/mm3    Basophils Absolute 0.08 0.00 - 0.20 10*3/mm3    Immature Granulocyte Rel % 0.1 0.0 - 0.5 %    Immature Grans Absolute 0.01 0.00 - 0.05 10*3/mm3    nRBC 0.0 0.0 - 0.2 /100 WBC         Assessment/Plan       Diagnoses and all orders for this visit:    1. CRI (chronic renal insufficiency), stage 2 (mild) (Primary)  -     Vitamin B12  -     CBC & Differential  -     Comprehensive Metabolic Panel  -     TSH  -     T4, Free    2. Acquired hypothyroidism  -     Vitamin B12  -     CBC & Differential  -     Comprehensive Metabolic Panel  -     TSH  -     T4, Free    3. Hypercholesterolemia  -     Vitamin B12  -     CBC & Differential  -     Comprehensive Metabolic Panel  -     TSH  -     T4, Free      No follow-ups on file.          There are no Patient Instructions on file for this visit.     Segundo Vo MD    Assessment/Plan

## 2021-01-06 LAB
ALBUMIN SERPL-MCNC: 4.2 G/DL (ref 3.5–5.2)
ALBUMIN/GLOB SERPL: 1.9 G/DL
ALP SERPL-CCNC: 97 U/L (ref 39–117)
ALT SERPL-CCNC: 15 U/L (ref 1–41)
AST SERPL-CCNC: 21 U/L (ref 1–40)
BASOPHILS # BLD AUTO: 0.08 10*3/MM3 (ref 0–0.2)
BASOPHILS NFR BLD AUTO: 1.1 % (ref 0–1.5)
BILIRUB SERPL-MCNC: 0.2 MG/DL (ref 0–1.2)
BUN SERPL-MCNC: 28 MG/DL (ref 8–23)
BUN/CREAT SERPL: 20.4 (ref 7–25)
CALCIUM SERPL-MCNC: 9.1 MG/DL (ref 8.6–10.5)
CHLORIDE SERPL-SCNC: 108 MMOL/L (ref 98–107)
CHOLEST SERPL-MCNC: 164 MG/DL (ref 0–200)
CO2 SERPL-SCNC: 25.5 MMOL/L (ref 22–29)
CREAT SERPL-MCNC: 1.37 MG/DL (ref 0.76–1.27)
EOSINOPHIL # BLD AUTO: 0.27 10*3/MM3 (ref 0–0.4)
EOSINOPHIL NFR BLD AUTO: 3.9 % (ref 0.3–6.2)
ERYTHROCYTE [DISTWIDTH] IN BLOOD BY AUTOMATED COUNT: 13 % (ref 12.3–15.4)
GLOBULIN SER CALC-MCNC: 2.2 GM/DL
GLUCOSE SERPL-MCNC: 109 MG/DL (ref 65–99)
HCT VFR BLD AUTO: 48.5 % (ref 37.5–51)
HDLC SERPL-MCNC: 39 MG/DL (ref 40–60)
HGB BLD-MCNC: 16.4 G/DL (ref 13–17.7)
IMM GRANULOCYTES # BLD AUTO: 0.01 10*3/MM3 (ref 0–0.05)
IMM GRANULOCYTES NFR BLD AUTO: 0.1 % (ref 0–0.5)
LDLC SERPL CALC-MCNC: 97 MG/DL (ref 0–100)
LYMPHOCYTES # BLD AUTO: 2.3 10*3/MM3 (ref 0.7–3.1)
LYMPHOCYTES NFR BLD AUTO: 33 % (ref 19.6–45.3)
MCH RBC QN AUTO: 30.8 PG (ref 26.6–33)
MCHC RBC AUTO-ENTMCNC: 33.8 G/DL (ref 31.5–35.7)
MCV RBC AUTO: 91.2 FL (ref 79–97)
MONOCYTES # BLD AUTO: 0.68 10*3/MM3 (ref 0.1–0.9)
MONOCYTES NFR BLD AUTO: 9.7 % (ref 5–12)
NEUTROPHILS # BLD AUTO: 3.64 10*3/MM3 (ref 1.7–7)
NEUTROPHILS NFR BLD AUTO: 52.2 % (ref 42.7–76)
NRBC BLD AUTO-RTO: 0 /100 WBC (ref 0–0.2)
PLATELET # BLD AUTO: 207 10*3/MM3 (ref 140–450)
POTASSIUM SERPL-SCNC: 4.6 MMOL/L (ref 3.5–5.2)
PROT SERPL-MCNC: 6.4 G/DL (ref 6–8.5)
PSA SERPL-MCNC: 0.42 NG/ML (ref 0–4)
RBC # BLD AUTO: 5.32 10*6/MM3 (ref 4.14–5.8)
SODIUM SERPL-SCNC: 141 MMOL/L (ref 136–145)
T4 FREE SERPL-MCNC: 1.5 NG/DL (ref 0.93–1.7)
TRIGL SERPL-MCNC: 158 MG/DL (ref 0–150)
TSH SERPL DL<=0.005 MIU/L-ACNC: 0.36 UIU/ML (ref 0.27–4.2)
VIT B12 SERPL-MCNC: 632 PG/ML (ref 211–946)
VIT B6 SERPL-MCNC: 8.2 UG/L (ref 5.3–46.7)
VLDLC SERPL CALC-MCNC: 28 MG/DL (ref 5–40)
WBC # BLD AUTO: 6.98 10*3/MM3 (ref 3.4–10.8)

## 2021-01-06 RX ORDER — MULTIVITAMIN WITH IRON
100 TABLET ORAL DAILY
Qty: 90 TABLET | Refills: 3 | Status: SHIPPED | OUTPATIENT
Start: 2021-01-06 | End: 2021-06-10

## 2021-03-12 ENCOUNTER — TELEPHONE (OUTPATIENT)
Dept: INTERNAL MEDICINE | Facility: CLINIC | Age: 61
End: 2021-03-12

## 2021-03-12 RX ORDER — OMEPRAZOLE 40 MG/1
40 CAPSULE, DELAYED RELEASE ORAL DAILY
Qty: 90 CAPSULE | Refills: 3 | Status: SHIPPED | OUTPATIENT
Start: 2021-03-12 | End: 2022-01-01 | Stop reason: SDUPTHER

## 2021-03-12 NOTE — TELEPHONE ENCOUNTER
PT CALLED TO REQUEST REFILL FOR RX  omeprazole (priLOSEC) 20 MG capsule, PT WOULD LIKE FOR RX  TO BR RAISED TO AT LEAST 40MG.    PLEASE ADVISE.  CALL BACK:[phone]      [facility] - [place], [place] - [address]

## 2021-04-22 NOTE — TELEPHONE ENCOUNTER
Caller: Terry Mcguire    Relationship: Self    Best call back number: [phone]  Medication needed:   Requested Prescriptions     Pending Prescriptions Disp Refills   • levothyroxine (SYNTHROID, LEVOTHROID) 100 MCG tablet 90 tablet 3       When do you need the refill by: AS SOON AS POSSIBLE    What additional details did the patient provide when requesting the medication:     PATIENT STATED [employer] REQUESTED REFILL.  PATIENT STATED HE HAS BEEN OUT FOR SEVERAL DAYS.    PATIENT REQUESTING PARTIAL REFILL UNTIL ORDER IS RECEIVED FROM [facility] FOR HIS    levothyroxine (SYNTHROID, LEVOTHROID) 100 MCG tablet    PLEASE SEND PARTIAL REFILL TO:    [id number] [place] [address] AT [address] - [phone] [facility] [phone] FX  [phone]      Does the patient have less than a 3 day supply:  [x] Yes  [] No    What is the patient's preferred pharmacy: [facility] - [facility] [address] [facility] - [phone] [facility] [phone] FX

## 2021-04-23 RX ORDER — LEVOTHYROXINE SODIUM 0.1 MG/1
100 TABLET ORAL DAILY
Qty: 30 TABLET | Refills: 3 | Status: SHIPPED | OUTPATIENT
Start: 2021-04-23 | End: 2022-01-01

## 2021-04-23 RX ORDER — LEVOTHYROXINE SODIUM 0.1 MG/1
100 TABLET ORAL DAILY
Qty: 90 TABLET | Refills: 3 | Status: SHIPPED | OUTPATIENT
Start: 2021-04-23 | End: 2021-04-23 | Stop reason: SDUPTHER

## 2021-05-11 PROCEDURE — 93296 REM INTERROG EVL PM/IDS: CPT | Performed by: INTERNAL MEDICINE

## 2021-05-11 PROCEDURE — 93294 REM INTERROG EVL PM/LDLS PM: CPT | Performed by: INTERNAL MEDICINE

## 2021-06-02 ENCOUNTER — TELEPHONE (OUTPATIENT)
Dept: CARDIOLOGY | Facility: CLINIC | Age: 61
End: 2021-06-02

## 2021-06-02 NOTE — TELEPHONE ENCOUNTER
"Noted; I am perplexed by his request.  He may need to discuss this issue with his electrophysiologist and I do feel we need to have more information concerning the \"magnets\" on his oxygen mask.    Thanks!  "

## 2021-06-02 NOTE — TELEPHONE ENCOUNTER
Called pt and gave KTS recommendations above. Pt verbalizes understanding and agreeable to plan.    Patient is going to have medical supply company call us to give us more information.

## 2021-06-02 NOTE — TELEPHONE ENCOUNTER
Patient called and stated that he needs letter of clearance from cardiology saying that is it ok for pt to wear CPAP mask that has magnets on it, due to pacemaker.    Patient did not have any further details regarding the mask.     Please advise.

## 2021-06-03 NOTE — TELEPHONE ENCOUNTER
Noted; concur with mask use but would ensure that these magnets are never placed around his device.    Thanks!

## 2021-06-03 NOTE — TELEPHONE ENCOUNTER
Geri Cortez called and stated that the magnetic strength of magnets on CPAP mask is <400 mt and there was a warning to keep magnets/mask 2 inches away from implanted medical devices.     Would this be ok for patient to wear? Patient not currently seeing an electrophysiologist.    Please advise.

## 2021-06-03 NOTE — TELEPHONE ENCOUNTER
Pt notified of KTS recommendations above. Letter faxed to [facility] with KTS recommendations above. F# [phone]

## 2021-06-09 NOTE — PROGRESS NOTES
Subjective:     Encounter Date:06/10/2021    Patient ID: Terry Mcguire is a 61 y.o.  white male, retired/disabled , and resident of [place], [place].     INTERNIST:  Segundo Vo MD  [facility] CARDIOLOGIST:  Segundo Duran MD, [facility]  NEUROLOGIST:  Shukri Taylor MD  PAIN SPECIALIST:  [facility]    Chief Complaint:   Chief Complaint   Patient presents with   • Hyperlipidemia   • sick sinus syndrome       Problem List:  1. Symptomatic bradycardia:  a. Status post [employer] pacemaker implantation in 2006.  b. Subsequent [employer] Bishop Accent DR/RF 2210 pacemaker implant, 05/22/2013  c. Abnormal interrogation with intermittent atrial tachycardias and nonsustained ventricular tachycardia, March 2018, with normal nuclear stress test  d. Abnormal acceptable pacemaker interrogation, April 2019, June 2021  2. Benign hypertension.  3. Hypercholesterolemia.  4.  Chest pain syndrome:  a. Abnormal Cardiolite, 09/26/2006, with inferior  wall defect.  b. Normal cardiac catheterization, 10/06/2006; normal EF.  c. Acceptable negative IV Lexiscan Cardiolite stress study suggestive of low probability for significant focal obstructive coronary artery disease with preserved systolic left ventricular function (LVEF 0.77), March 2018.  d. Residual class I symptoms, June 2021  5. Ongoing tobacco abuse with chronic obstructive pulmonary disease (1.5 PPD)  6. Obstructive sleep apnea, with CPAP.  7. Seasonal allergies/rhinitis.  8. Osteoarthritis with chronic pain issues.  9. Gastroesophageal reflux disease with hiatal hernia.  10. Hypothyroidism  on chronic supplementation.  11. Essential tremors.  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  13. Mild obesity, BMI 30.67  14. Family history of hypertension.  15. Surgical history:  a. Pacemaker implantation.  b. Left ankle surgery.  c. Multiple colonoscopies.  d. Herniorrhaphy.  e. Bilateral elbow surgery.  f. Bilateral carpal  tunnel release.      No Known Allergies    Current Outpatient Medications:   •  aspirin 81 MG tablet, Take  by mouth daily., Disp: , Rfl:   •  cyclobenzaprine (FLEXERIL) 10 MG tablet, Take 1 tablet by mouth 4 (Four) Times a Day As Needed for Muscle Spasms., Disp: 120 tablet, Rfl: 11  •  levocetirizine (XYZAL) 5 MG tablet, Take 5 mg by mouth Daily., Disp: , Rfl: 0  •  levothyroxine (SYNTHROID, LEVOTHROID) 100 MCG tablet, Take 1 tablet by mouth Daily., Disp: 30 tablet, Rfl: 3  •  lovastatin (MEVACOR) 20 MG tablet, TAKE 1 TABLET EVERY NIGHT, Disp: 90 tablet, Rfl: 3  •  Morphine (MS CONTIN) 30 MG 12 hr tablet, take 1 tablet by mouth two times a day, Disp: , Rfl: 0  •  omeprazole (priLOSEC) 40 MG capsule, Take 1 capsule by mouth Daily., Disp: 90 capsule, Rfl: 3  •  oxyCODONE (ROXICODONE) 10 MG tablet, Take  by mouth 2 (Two) Times a Day., Disp: , Rfl:   •  tamsulosin (FLOMAX) 0.4 MG capsule 24 hr capsule, Take 0.4 mg by mouth Daily., Disp: , Rfl:     History of Present Illness: Patient returns for a scheduled 6-month follow up. He has been doing well overall from a cardiopulmonary standpoint. He reports having a lower back pain that results in his right leg going numb causing him to collapse. He agrees to see a back surgeon in [place]. He used to smoke 1.5 packs a day and now 1 pack a day. He mentions his wife has been having health issues with her blood pressure. He reports using his CPAP nightly but is in the processing of receiving a new mask. Patient denies chest pain, shortness of breath, palpitations, edema, dizziness, and syncope. Patient has had no interim ER visits, hospitalizations, serious illnesses, or injuries. He has not received his Coronavirus immunizations and would not like them.             ROS   Obtained and negative except as outlined in problem list and HPI.    Procedures     [employer] PPM 2210   · Mode: DDDR / 70 per min  · P wave: 4.0  · R wave: > 12.0  · Nominal threshold and impedance for RA and  "RV leads  · Battery voltage 2.83 (3 years)  · NSR at 68 per min  · Not dependant   · 9 brief atrial tachycardia episodes lasting 5-50 sec  · Home monitor in place and actively transmitting   · Acceptable interrogation without reprogramming   · Implant date 22 May 2013       Objective:       Vitals:    06/10/21 1332 06/10/21 1333   BP: 140/64 134/62   BP Location: Left arm Left arm   Patient Position: Sitting Standing   Pulse: 72 71   SpO2: 94%    Weight: 84.4 kg (186 lb)    Height: 167.6 cm (66\")      Body mass index is 30.02 kg/m².  Last weight: 190 lb    Vitals reviewed.   Constitutional:       Appearance: Well-developed.   Neck:      Thyroid: No thyromegaly.      Vascular: No carotid bruit or JVD.      Lymphadenopathy: No cervical adenopathy.   Pulmonary:      Effort: Pulmonary effort is normal.      Breath sounds: Decreased breath sounds present. No wheezing. No rhonchi. No rales.   Chest:      Comments: Nominal left precordial pacemaker site  Cardiovascular:      Regular rhythm.      Murmurs: There is a grade 1/6 mid frequency midsystolic murmur at the URSB, radiating to the neck.      No gallop. No S3 gallop.   Pulses:     Dorsalis pedis: 1+ bilaterally.     Posterior tibial: 1+ bilaterally.  Edema:     Peripheral edema absent.   Abdominal:      Palpations: Abdomen is soft. There is no abdominal mass.      Tenderness: There is no abdominal tenderness. There is no guarding.   Musculoskeletal: Normal range of motion. Skin:     General: Skin is warm and dry.      Findings: No rash.   Neurological:      Mental Status: Alert and oriented to person, place, and time.           Lab Review:   Lab Results   Component Value Date    BUN 28 (H) 12/31/2020    CREATININE 1.37 (H) 12/31/2020    EGFRIFNONA 53 (L) 12/31/2020    EGFRIFAFRI 64 12/31/2020    BCR 20.4 12/31/2020     12/31/2020    K 4.6 12/31/2020     (H) 12/31/2020    CO2 25.5 12/31/2020    CALCIUM 9.1 12/31/2020    PROTENTOTREF 6.4 12/31/2020    " ALBUMIN 4.20 12/31/2020    LABIL2 1.9 12/31/2020    AST 21 12/31/2020    ALT 15 12/31/2020       Lab Results   Component Value Date    WBC 6.98 12/31/2020    HGB 16.4 12/31/2020    HCT 48.5 12/31/2020    MCV 91.2 12/31/2020     12/31/2020       Lab Results   Component Value Date    TSH 0.358 12/31/2020       Lab Results   Component Value Date    TRIG 158 (H) 12/31/2020    TRIG 174 (H) 02/25/2020    TRIG 219 (H) 05/22/2017     Lab Results   Component Value Date    HDL 39 (L) 12/31/2020    HDL 41 02/25/2020    HDL 45 05/22/2017     Lab Results   Component Value Date    LDL 97 12/31/2020     (H) 02/25/2020    LDL 79 05/22/2017             Assessment:       Overall continued acceptable course with no new interim cardiopulmonary complaints with good functional status. We will defer additional diagnostic or therapeutic intervention from a cardiac perspective at this time.      Diagnosis Plan   1. Benign hypertension  Acceptable control; continue current treatment   2. Sick sinus syndrome (CMS/HCC)  Stable clinical course with acceptable device assessment   3. NETTIE on CPAP  Compliance stressed   4. Tobacco abuse  Smoking cessation encouraged   5. Dyslipidemia  LDL 97 December 2020; continue lovastatin   6.  Lower back pain Referral to Dr. Julio Castaneda in [place]          Plan:         1. Patient to continue current medications and close follow up with the above providers.  2. Tentative cardiology follow up in December 2021 or patient may return sooner PRN.  3. Referal to Dr. Julio Castaneda for lower back pain and right leg weakness    Scribed for Noam Foster MD by Radhika Freire. 6/10/2021 13:44 EDT    I, Noam Foster MD, [facility], personally performed the services described in this documentation as scribed by the above named individual in my presence, and it is both accurate and complete. At 16:04 EDT on 06/10/2021

## 2021-06-10 ENCOUNTER — OFFICE VISIT (OUTPATIENT)
Dept: CARDIOLOGY | Facility: CLINIC | Age: 61
End: 2021-06-10

## 2021-06-10 VITALS
WEIGHT: 186 LBS | HEART RATE: 71 BPM | BODY MASS INDEX: 29.89 KG/M2 | HEIGHT: 66 IN | SYSTOLIC BLOOD PRESSURE: 134 MMHG | DIASTOLIC BLOOD PRESSURE: 62 MMHG | OXYGEN SATURATION: 94 %

## 2021-06-10 DIAGNOSIS — I49.5 SICK SINUS SYNDROME (HCC): ICD-10-CM

## 2021-06-10 DIAGNOSIS — E78.5 DYSLIPIDEMIA: ICD-10-CM

## 2021-06-10 DIAGNOSIS — Z72.0 TOBACCO ABUSE: ICD-10-CM

## 2021-06-10 DIAGNOSIS — G47.33 OSA ON CPAP: ICD-10-CM

## 2021-06-10 DIAGNOSIS — Z99.89 OSA ON CPAP: ICD-10-CM

## 2021-06-10 DIAGNOSIS — I10 BENIGN HYPERTENSION: Primary | ICD-10-CM

## 2021-06-10 PROCEDURE — 99214 OFFICE O/P EST MOD 30 MIN: CPT | Performed by: INTERNAL MEDICINE

## 2021-06-10 PROCEDURE — 93288 INTERROG EVL PM/LDLS PM IP: CPT | Performed by: INTERNAL MEDICINE

## 2021-06-10 RX ORDER — TAMSULOSIN HYDROCHLORIDE 0.4 MG/1
0.4 CAPSULE ORAL DAILY
COMMUNITY
Start: 2021-03-31

## 2021-08-10 PROCEDURE — 93294 REM INTERROG EVL PM/LDLS PM: CPT | Performed by: INTERNAL MEDICINE

## 2021-08-10 PROCEDURE — 93296 REM INTERROG EVL PM/IDS: CPT | Performed by: INTERNAL MEDICINE

## 2021-08-16 ENCOUNTER — OFFICE VISIT (OUTPATIENT)
Dept: INTERNAL MEDICINE | Facility: CLINIC | Age: 61
End: 2021-08-16

## 2021-08-16 VITALS
HEIGHT: 66 IN | BODY MASS INDEX: 29.73 KG/M2 | OXYGEN SATURATION: 100 % | HEART RATE: 96 BPM | TEMPERATURE: 97.7 F | DIASTOLIC BLOOD PRESSURE: 72 MMHG | RESPIRATION RATE: 16 BRPM | SYSTOLIC BLOOD PRESSURE: 116 MMHG | WEIGHT: 185 LBS

## 2021-08-16 DIAGNOSIS — N18.2 CRI (CHRONIC RENAL INSUFFICIENCY), STAGE 2 (MILD): Primary | ICD-10-CM

## 2021-08-16 DIAGNOSIS — G60.0 HEREDITARY SENSORIMOTOR NEUROPATHY: ICD-10-CM

## 2021-08-16 PROCEDURE — 99213 OFFICE O/P EST LOW 20 MIN: CPT | Performed by: INTERNAL MEDICINE

## 2021-08-16 PROCEDURE — G0439 PPPS, SUBSEQ VISIT: HCPCS | Performed by: INTERNAL MEDICINE

## 2021-08-16 PROCEDURE — 96160 PT-FOCUSED HLTH RISK ASSMT: CPT | Performed by: INTERNAL MEDICINE

## 2021-08-16 PROCEDURE — 1170F FXNL STATUS ASSESSED: CPT | Performed by: INTERNAL MEDICINE

## 2021-08-16 PROCEDURE — 1159F MED LIST DOCD IN RCRD: CPT | Performed by: INTERNAL MEDICINE

## 2021-08-16 RX ORDER — NEOMYCIN SULFATE, POLYMYXIN B SULFATE AND HYDROCORTISONE 10; 3.5; 1 MG/ML; MG/ML; [USP'U]/ML
SUSPENSION/ DROPS AURICULAR (OTIC)
COMMUNITY
Start: 2021-06-29 | End: 2022-01-01

## 2021-08-16 RX ORDER — NAPROXEN 375 MG/1
375 TABLET ORAL 2 TIMES DAILY PRN
COMMUNITY
Start: 2021-06-22

## 2021-08-16 NOTE — PROGRESS NOTES
"Subjective     Patient ID: Terry Mcguire is a 61 y.o. male. Patient is here for management of multiple medical problems.     Chief Complaint   Patient presents with   • Follow-up     lab results     History of Present Illness     renal insiuf.        Bmp not done        The following portions of the patient's history were reviewed and updated as appropriate: allergies, current medications, past family history, past medical history, past social history, past surgical history and problem list.    Review of Systems    Current Outpatient Medications:   •  aspirin 81 MG tablet, Take  by mouth daily., Disp: , Rfl:   •  cyclobenzaprine (FLEXERIL) 10 MG tablet, Take 1 tablet by mouth 4 (Four) Times a Day As Needed for Muscle Spasms., Disp: 120 tablet, Rfl: 11  •  levocetirizine (XYZAL) 5 MG tablet, Take 5 mg by mouth Daily., Disp: , Rfl: 0  •  levothyroxine (SYNTHROID, LEVOTHROID) 100 MCG tablet, Take 1 tablet by mouth Daily., Disp: 30 tablet, Rfl: 3  •  lovastatin (MEVACOR) 20 MG tablet, TAKE 1 TABLET EVERY NIGHT, Disp: 90 tablet, Rfl: 3  •  Morphine (MS CONTIN) 30 MG 12 hr tablet, take 1 tablet by mouth two times a day, Disp: , Rfl: 0  •  naproxen (NAPROSYN) 375 MG tablet, TAKE 1 TABLET BY MOUTH TWICE DAILY AS NEEDED FOR ARTHRITIS PAIN, Disp: , Rfl:   •  neomycin-polymyxin-hydrocortisone (CORTISPORIN) 3.5-73786-3 otic suspension, SHAKE LIQUID AND INSTILL 4 DROPS IN BOTH EARS THREE TIMES DAILY, Disp: , Rfl:   •  omeprazole (priLOSEC) 40 MG capsule, Take 1 capsule by mouth Daily., Disp: 90 capsule, Rfl: 3  •  oxyCODONE (ROXICODONE) 10 MG tablet, Take  by mouth 2 (Two) Times a Day., Disp: , Rfl:   •  tamsulosin (FLOMAX) 0.4 MG capsule 24 hr capsule, Take 0.4 mg by mouth Daily., Disp: , Rfl:     Objective      Blood pressure 116/72, pulse 96, temperature 97.7 °F (36.5 °C), temperature source Temporal, resp. rate 16, height 167.6 cm (66\"), weight 83.9 kg (185 lb), SpO2 100 %.    Physical Exam     General Appearance:    " Alert, cooperative, no distress, appears stated age   Head:    Normocephalic, without obvious abnormality, atraumatic   Eyes:    PERRL, conjunctiva/corneas clear, EOM's intact   Ears:    Normal TM's and external ear canals, both ears   Nose:   Nares normal, septum midline, mucosa normal, no drainage   or sinus tenderness   Throat:   Lips, mucosa, and tongue normal; teeth and gums normal   Neck:   Supple, symmetrical, trachea midline, no adenopathy;        thyroid:  No enlargement/tenderness/nodules; no carotid    bruit or JVD   Back:     Symmetric, no curvature, ROM normal, no CVA tenderness   Lungs:     Clear to auscultation bilaterally, respirations unlabored   Chest wall:    No tenderness or deformity   Heart:    Regular rate and rhythm, S1 and S2 normal, no murmur,        rub or gallop   Abdomen:     Soft, non-tender, bowel sounds active all four quadrants,     no masses, no organomegaly   Extremities:   Extremities normal, atraumatic, no cyanosis or edema   Pulses:   2+ and symmetric all extremities   Skin:   Skin color, texture, turgor normal, no rashes or lesions   Lymph nodes:   Cervical, supraclavicular, and axillary nodes normal   Neurologic:   CNII-XII intact. Normal strength, sensation and reflexes       throughout      Results for orders placed or performed in visit on 12/30/20   Lipid Panel    Specimen: Blood   Result Value Ref Range    Total Cholesterol 164 0 - 200 mg/dL    Triglycerides 158 (H) 0 - 150 mg/dL    HDL Cholesterol 39 (L) 40 - 60 mg/dL    VLDL Cholesterol [place] 28 5 - 40 mg/dL    LDL Chol Calc (NIH) 97 0 - 100 mg/dL   PSA Screen    Specimen: Blood   Result Value Ref Range    PSA 0.424 0.000 - 4.000 ng/mL   Comprehensive Metabolic Panel    Specimen: Blood   Result Value Ref Range    Glucose 109 (H) 65 - 99 mg/dL    BUN 28 (H) 8 - 23 mg/dL    Creatinine 1.37 (H) 0.76 - 1.27 mg/dL    eGFR Non African Am 53 (L) >60 mL/min/1.73    eGFR African Am 64 >60 mL/min/1.73    BUN/Creatinine Ratio 20.4  7.0 - 25.0    Sodium 141 136 - 145 mmol/L    Potassium 4.6 3.5 - 5.2 mmol/L    Chloride 108 (H) 98 - 107 mmol/L    Total CO2 25.5 22.0 - 29.0 mmol/L    Calcium 9.1 8.6 - 10.5 mg/dL    Total Protein 6.4 6.0 - 8.5 g/dL    Albumin 4.20 3.50 - 5.20 g/dL    Globulin 2.2 gm/dL    A/G Ratio 1.9 g/dL    Total Bilirubin 0.2 0.0 - 1.2 mg/dL    Alkaline Phosphatase 97 39 - 117 U/L    AST (SGOT) 21 1 - 40 U/L    ALT (SGPT) 15 1 - 41 U/L   Vitamin B12    Specimen: Blood   Result Value Ref Range    Vitamin B-12 632 211 - 946 pg/mL   TSH    Specimen: Blood   Result Value Ref Range    TSH 0.358 0.270 - 4.200 uIU/mL   T4, Free    Specimen: Blood   Result Value Ref Range    Free T4 1.50 0.93 - 1.70 ng/dL   Vitamin B6    Specimen: Blood   Result Value Ref Range    Vitamin B6 8.2 5.3 - 46.7 ug/L   CBC & Differential    Specimen: Blood   Result Value Ref Range    WBC 6.98 3.40 - 10.80 10*3/mm3    RBC 5.32 4.14 - 5.80 10*6/mm3    Hemoglobin 16.4 13.0 - 17.7 g/dL    Hematocrit 48.5 37.5 - 51.0 %    MCV 91.2 79.0 - 97.0 fL    MCH 30.8 26.6 - 33.0 pg    MCHC 33.8 31.5 - 35.7 g/dL    RDW 13.0 12.3 - 15.4 %    Platelets 207 140 - 450 10*3/mm3    Neutrophil Rel % 52.2 42.7 - 76.0 %    Lymphocyte Rel % 33.0 19.6 - 45.3 %    Monocyte Rel % 9.7 5.0 - 12.0 %    Eosinophil Rel % 3.9 0.3 - 6.2 %    Basophil Rel % 1.1 0.0 - 1.5 %    Neutrophils Absolute 3.64 1.70 - 7.00 10*3/mm3    Lymphocytes Absolute 2.30 0.70 - 3.10 10*3/mm3    Monocytes Absolute 0.68 0.10 - 0.90 10*3/mm3    Eosinophils Absolute 0.27 0.00 - 0.40 10*3/mm3    Basophils Absolute 0.08 0.00 - 0.20 10*3/mm3    Immature Granulocyte Rel % 0.1 0.0 - 0.5 %    Immature Grans Absolute 0.01 0.00 - 0.05 10*3/mm3    nRBC 0.0 0.0 - 0.2 /100 WBC         Assessment/Plan     Cr down to 1.29 oin t 5/4/21 labs. Pt will hydrated and repeat labs.      Diagnoses and all orders for this visit:    1. CRI (chronic renal insufficiency), stage 2 (mild) (Primary)    2. Hereditary sensorimotor  neuropathy      Return in about 6 months (around 2/16/2022).          There are no Patient Instructions on file for this visit.     Segundo Vo MD    Assessment/Plan

## 2021-08-16 NOTE — PROGRESS NOTES
QUICK REFERENCE INFORMATION:  The ABCs of the Annual Wellness Visit    Medicare Annual Wellness Visit    Subjective   History of Present Illness    Terry Mcguire is a 61 y.o. male who presents for an Annual Wellness Visit. In addition, we addressed the following health issues:    Chronic pain 4/10        PMH, PSH, SocHx, FamHx, Allergies, and Medications: Reviewed and updated.     Outpatient Medications Prior to Visit   Medication Sig Dispense Refill   • aspirin 81 MG tablet Take  by mouth daily.     • cyclobenzaprine (FLEXERIL) 10 MG tablet Take 1 tablet by mouth 4 (Four) Times a Day As Needed for Muscle Spasms. 120 tablet 11   • levocetirizine (XYZAL) 5 MG tablet Take 5 mg by mouth Daily.  0   • levothyroxine (SYNTHROID, LEVOTHROID) 100 MCG tablet Take 1 tablet by mouth Daily. 30 tablet 3   • lovastatin (MEVACOR) 20 MG tablet TAKE 1 TABLET EVERY NIGHT 90 tablet 3   • Morphine (MS CONTIN) 30 MG 12 hr tablet take 1 tablet by mouth two times a day  0   • naproxen (NAPROSYN) 375 MG tablet TAKE 1 TABLET BY MOUTH TWICE DAILY AS NEEDED FOR ARTHRITIS PAIN     • neomycin-polymyxin-hydrocortisone (CORTISPORIN) 3.5-81398-6 otic suspension SHAKE LIQUID AND INSTILL 4 DROPS IN BOTH EARS THREE TIMES DAILY     • omeprazole (priLOSEC) 40 MG capsule Take 1 capsule by mouth Daily. 90 capsule 3   • oxyCODONE (ROXICODONE) 10 MG tablet Take  by mouth 2 (Two) Times a Day.     • tamsulosin (FLOMAX) 0.4 MG capsule 24 hr capsule Take 0.4 mg by mouth Daily.       No facility-administered medications prior to visit.       Patient Active Problem List   Diagnosis   • Chronic obstructive pulmonary disease (CMS/HCC)   • Eczema   • Hypercholesterolemia   • Hyperthyroidism   • Hypothyroidism   • Nausea   • NETTIE on CPAP   • Hereditary sensorimotor neuropathy   • Cerebrovascular accident (CMS/HCC)   • Vitamin D deficiency   • Benign hypertension   • Chest pain syndrome   • Tobacco abuse   • Seasonal allergic rhinitis   • Osteoarthritis   •  Gastroesophageal reflux disease with hiatal hernia   • Essential tremor   • Charcot-Jolene-Tooth disease   • Obesity   • Sick sinus syndrome (CMS/HCC)   • Skin lesion of chest wall   • Dyslipidemia   • Abnormal electrocardiogram        Health Habits:  Dental Exam. no teeth  Eye Exam. not up to date - not done  No exam data present  Exercise: 2 times/week.  Current exercise activities include: yard work    Health Risk Assessment:  The patient has completed a Health Risk Assessment. This has been reviewed with them and has been scanned into the patient's chart.    Current Medical Providers:  Patient Care Team:  Segundo Vo MD as PCP - General (Internal Medicine)    The [facility] providers who are involved in the care of this patient are listed above. Additional providers and suppliers are listed below:  Dr Foster.      Recent Hospitalizations:  No recent hospitalization(s)..    Recent Lab Results:  CMP:  Lab Results   Component Value Date     (H) 12/31/2020    BUN 28 (H) 12/31/2020    CREATININE 1.37 (H) 12/31/2020    EGFRIFNONA 53 (L) 12/31/2020    EGFRIFAFRI 64 12/31/2020    BCR 20.4 12/31/2020     12/31/2020    K 4.6 12/31/2020    CO2 25.5 12/31/2020    CALCIUM 9.1 12/31/2020    PROTENTOTREF 6.4 12/31/2020    ALBUMIN 4.20 12/31/2020    LABGLOBREF 2.2 12/31/2020    LABIL2 1.9 12/31/2020    BILITOT 0.2 12/31/2020    ALKPHOS 97 12/31/2020    AST 21 12/31/2020    ALT 15 12/31/2020       LIPID PANEL:  Lab Results   Component Value Date    CHLPL 164 12/31/2020    TRIG 158 (H) 12/31/2020    HDL 39 (L) 12/31/2020    VLDL 28 12/31/2020    LDL 97 12/31/2020       HbA1c:  No results found for: HGBA1C    URINE MICROALBUMIN:  No results found for: MICROALBUR, POCMALB    PSA:  Lab Results   Component Value Date    PSA 0.424 12/31/2020       Age-appropriate Screening Schedule:  Refer to the list below for future screening recommendations based on patient's age, sex and/or medical conditions. Orders for  these recommended tests are listed in the plan section. The patient has been provided with a written plan.    Health Maintenance   Topic Date Due   • TDAP/TD VACCINES (1 - Tdap) Never done   • ZOSTER VACCINE (1 of 2) Never done   • INFLUENZA VACCINE  10/01/2021   • LIPID PANEL  12/31/2021       Depression Screen:   PHQ-2/PHQ-9 Depression Screening 8/16/2021   Little interest or pleasure in doing things 0   Feeling down, depressed, or hopeless 0   Trouble falling or staying asleep, or sleeping too much -   Feeling tired or having little energy -   Poor appetite or overeating -   Feeling bad about yourself - or that you are a failure or have let yourself or your family down -   Trouble concentrating on things, such as reading the newspaper or watching television -   Moving or speaking so slowly that other people could have noticed. Or the opposite - being so fidgety or restless that you have been moving around a lot more than usual -   Thoughts that you would be better off dead, or of hurting yourself in some way -   Total Score 0   If you checked off any problems, how difficult have these problems made it for you to do your work, take care of things at home, or get along with other people? -         Functional and Cognitive Screening:  Functional & Cognitive Status 8/16/2021   Do you have difficulty preparing food and eating? No   Do you have difficulty bathing yourself, getting dressed or grooming yourself? No   Do you have difficulty using the toilet? No   Do you have difficulty moving around from place to place? No   Do you have trouble with steps or getting out of a bed or a chair? No   Current Diet Well Balanced Diet   Dental Exam Not up to date   Eye Exam Not up to date   Exercise (times per week) 2 times per week   Current Exercises Include Yard Work   Current Exercise Activities Include -   Do you need help using the phone?  No   Are you deaf or do you have serious difficulty hearing?  No   Do you need help  "with transportation? No   Do you need help shopping? No   Do you need help preparing meals?  No   Do you need help with housework?  No   Do you need help with laundry? No   Do you need help taking your medications? No   Do you need help managing money? No   Do you ever drive or ride in a car without wearing a seat belt? No   Have you felt unusual stress, anger or loneliness in the last month? No   Who do you live with? Spouse   If you need help, do you have trouble finding someone available to you? No   Have you been bothered in the last four weeks by sexual problems? No   Do you have difficulty concentrating, remembering or making decisions? No       Does the patient have evidence of cognitive impairment? No    Advanced Care Planning:  ACP discussion was held with the patient during this visit. Patient does not have an advance directive, declines further assistance.    Identification of Risk Factors:  Risk factors include: Advance Directive Discussion  Fall Risk  Obesity/Overweight .    Review of Systems    Compared to one year ago, the patient feels his physical health is the same.  Compared to one year ago, the patient feels his mental health is the same.    Objective     Physical Exam    Vitals:    08/16/21 1456   BP: 116/72   BP Location: Right arm   Patient Position: Sitting   Cuff Size: Adult   Pulse: 96   Resp: 16   Temp: 97.7 °F (36.5 °C)   TempSrc: Temporal   SpO2: 100%   Weight: 83.9 kg (185 lb)   Height: 167.6 cm (66\")       Body mass index is 29.86 kg/m².  Discussed the patient's BMI with him. The BMI is in the acceptable range.    Assessment/Plan   Patient Self-Management and Personalized Health Advice  The patient has been provided with information about: diet, exercise and weight management and preventive services including:   · Annual Wellness Visit (AWV).    Visit Diagnoses:    ICD-10-CM ICD-9-CM   1. CRI (chronic renal insufficiency), stage 2 (mild)  N18.2 585.2   2. Hereditary sensorimotor " neuropathy  G60.0 356.1       No orders of the defined types were placed in this encounter.      Outpatient Encounter Medications as of 8/16/2021   Medication Sig Dispense Refill   • aspirin 81 MG tablet Take  by mouth daily.     • cyclobenzaprine (FLEXERIL) 10 MG tablet Take 1 tablet by mouth 4 (Four) Times a Day As Needed for Muscle Spasms. 120 tablet 11   • levocetirizine (XYZAL) 5 MG tablet Take 5 mg by mouth Daily.  0   • levothyroxine (SYNTHROID, LEVOTHROID) 100 MCG tablet Take 1 tablet by mouth Daily. 30 tablet 3   • lovastatin (MEVACOR) 20 MG tablet TAKE 1 TABLET EVERY NIGHT 90 tablet 3   • Morphine (MS CONTIN) 30 MG 12 hr tablet take 1 tablet by mouth two times a day  0   • naproxen (NAPROSYN) 375 MG tablet TAKE 1 TABLET BY MOUTH TWICE DAILY AS NEEDED FOR ARTHRITIS PAIN     • neomycin-polymyxin-hydrocortisone (CORTISPORIN) 3.5-94564-7 otic suspension SHAKE LIQUID AND INSTILL 4 DROPS IN BOTH EARS THREE TIMES DAILY     • omeprazole (priLOSEC) 40 MG capsule Take 1 capsule by mouth Daily. 90 capsule 3   • oxyCODONE (ROXICODONE) 10 MG tablet Take  by mouth 2 (Two) Times a Day.     • tamsulosin (FLOMAX) 0.4 MG capsule 24 hr capsule Take 0.4 mg by mouth Daily.       No facility-administered encounter medications on file as of 8/16/2021.       Reviewed use of high risk medication in the elderly: yes  Reviewed for potential of harmful drug interactions in the elderly: yes    Follow Up:  Return in about 6 months (around 2/16/2022).     An After Visit Summary and PPPS with all of these plans were given to the patient.             Diagnoses and all orders for this visit:    1. CRI (chronic renal insufficiency), stage 2 (mild) (Primary)    2. Hereditary sensorimotor neuropathy

## 2021-08-25 RX ORDER — LOVASTATIN 20 MG/1
TABLET ORAL
Qty: 90 TABLET | Refills: 3 | Status: SHIPPED | OUTPATIENT
Start: 2021-08-25 | End: 2022-01-01 | Stop reason: SDUPTHER

## 2021-08-25 NOTE — TELEPHONE ENCOUNTER
Rx Refill Note  Requested Prescriptions     Pending Prescriptions Disp Refills   • lovastatin (MEVACOR) 20 MG tablet [Pharmacy Med Name: LOVASTATIN 20 MG Tablet] 90 tablet 3     Sig: TAKE 1 TABLET EVERY NIGHT      Last office visit with prescribing clinician: 8/16/2021      Next office visit with prescribing clinician: 2/16/2022            Tyrone Dixon MA  08/25/21, 07:54 EDT

## 2022-01-01 ENCOUNTER — READMISSION MANAGEMENT (OUTPATIENT)
Dept: CALL CENTER | Facility: HOSPITAL | Age: 62
End: 2022-01-01

## 2022-01-01 ENCOUNTER — HOSPITAL ENCOUNTER (OUTPATIENT)
Dept: PET IMAGING | Facility: HOSPITAL | Age: 62
Discharge: HOME | End: 2022-06-06

## 2022-01-01 ENCOUNTER — OFFICE VISIT (OUTPATIENT)
Dept: CARDIOLOGY | Facility: CLINIC | Age: 62
End: 2022-01-01

## 2022-01-01 ENCOUNTER — HOSPITAL ENCOUNTER (OUTPATIENT)
Dept: PULMONOLOGY | Facility: HOSPITAL | Age: 62
Discharge: HOME | End: 2022-08-17

## 2022-01-01 ENCOUNTER — HOSPITAL ENCOUNTER (OUTPATIENT)
Dept: CT IMAGING | Facility: HOSPITAL | Age: 62
Discharge: HOME | End: 2022-06-01

## 2022-01-01 ENCOUNTER — OFFICE VISIT (OUTPATIENT)
Dept: PULMONOLOGY | Facility: CLINIC | Age: 62
End: 2022-01-01

## 2022-01-01 ENCOUNTER — LAB (OUTPATIENT)
Dept: LAB | Facility: HOSPITAL | Age: 62
End: 2022-01-01

## 2022-01-01 ENCOUNTER — HOSPITAL ENCOUNTER (OUTPATIENT)
Dept: PULMONOLOGY | Facility: HOSPITAL | Age: 62
Discharge: HOME | End: 2022-07-13
Admitting: THORACIC SURGERY (CARDIOTHORACIC VASCULAR SURGERY)

## 2022-01-01 ENCOUNTER — APPOINTMENT (OUTPATIENT)
Dept: GENERAL RADIOLOGY | Facility: HOSPITAL | Age: 62
End: 2022-01-01

## 2022-01-01 ENCOUNTER — HOSPITAL ENCOUNTER (OUTPATIENT)
Dept: GENERAL RADIOLOGY | Facility: HOSPITAL | Age: 62
Discharge: HOME | End: 2022-08-17

## 2022-01-01 ENCOUNTER — DOCUMENTATION (OUTPATIENT)
Dept: CARDIAC REHAB | Facility: HOSPITAL | Age: 62
End: 2022-01-01

## 2022-01-01 ENCOUNTER — HOSPITAL ENCOUNTER (INPATIENT)
Facility: HOSPITAL | Age: 62
LOS: 4 days | Discharge: HOME | End: 2022-08-22
Attending: THORACIC SURGERY (CARDIOTHORACIC VASCULAR SURGERY) | Admitting: THORACIC SURGERY (CARDIOTHORACIC VASCULAR SURGERY)

## 2022-01-01 ENCOUNTER — DOCUMENTATION (OUTPATIENT)
Dept: PULMONOLOGY | Facility: CLINIC | Age: 62
End: 2022-01-01

## 2022-01-01 ENCOUNTER — APPOINTMENT (OUTPATIENT)
Dept: CT IMAGING | Facility: HOSPITAL | Age: 62
End: 2022-01-01

## 2022-01-01 ENCOUNTER — PRE-ADMISSION TESTING (OUTPATIENT)
Dept: PREADMISSION TESTING | Facility: HOSPITAL | Age: 62
End: 2022-01-01

## 2022-01-01 ENCOUNTER — PREP FOR SURGERY (OUTPATIENT)
Dept: OTHER | Facility: HOSPITAL | Age: 62
End: 2022-01-01

## 2022-01-01 ENCOUNTER — TELEPHONE (OUTPATIENT)
Dept: PULMONOLOGY | Facility: CLINIC | Age: 62
End: 2022-01-01

## 2022-01-01 ENCOUNTER — OFFICE VISIT (OUTPATIENT)
Dept: CARDIAC SURGERY | Facility: CLINIC | Age: 62
End: 2022-01-01

## 2022-01-01 ENCOUNTER — OFFICE VISIT (OUTPATIENT)
Dept: INTERNAL MEDICINE | Facility: CLINIC | Age: 62
End: 2022-01-01

## 2022-01-01 ENCOUNTER — HOSPITAL ENCOUNTER (OUTPATIENT)
Dept: GENERAL RADIOLOGY | Facility: HOSPITAL | Age: 62
Discharge: HOME | End: 2022-06-01

## 2022-01-01 ENCOUNTER — HOSPITAL ENCOUNTER (OUTPATIENT)
Dept: CT IMAGING | Facility: HOSPITAL | Age: 62
Discharge: HOME | End: 2022-05-04
Admitting: INTERNAL MEDICINE

## 2022-01-01 ENCOUNTER — TRANSITIONAL CARE MANAGEMENT TELEPHONE ENCOUNTER (OUTPATIENT)
Dept: CALL CENTER | Facility: HOSPITAL | Age: 62
End: 2022-01-01

## 2022-01-01 ENCOUNTER — ANESTHESIA EVENT (OUTPATIENT)
Dept: PERIOP | Facility: HOSPITAL | Age: 62
End: 2022-01-01

## 2022-01-01 ENCOUNTER — TELEPHONE (OUTPATIENT)
Dept: CARDIAC SURGERY | Facility: CLINIC | Age: 62
End: 2022-01-01

## 2022-01-01 ENCOUNTER — TELEPHONE (OUTPATIENT)
Dept: INFUSION THERAPY | Facility: HOSPITAL | Age: 62
End: 2022-01-01

## 2022-01-01 ENCOUNTER — NURSE NAVIGATOR (OUTPATIENT)
Dept: ONCOLOGY | Facility: CLINIC | Age: 62
End: 2022-01-01

## 2022-01-01 ENCOUNTER — TELEPHONE (OUTPATIENT)
Dept: CARDIOLOGY | Facility: CLINIC | Age: 62
End: 2022-01-01

## 2022-01-01 ENCOUNTER — ANESTHESIA (OUTPATIENT)
Dept: PERIOP | Facility: HOSPITAL | Age: 62
End: 2022-01-01

## 2022-01-01 VITALS
OXYGEN SATURATION: 95 % | WEIGHT: 181.8 LBS | DIASTOLIC BLOOD PRESSURE: 66 MMHG | HEART RATE: 92 BPM | BODY MASS INDEX: 29.22 KG/M2 | HEIGHT: 66 IN | SYSTOLIC BLOOD PRESSURE: 122 MMHG

## 2022-01-01 VITALS
BODY MASS INDEX: 28.77 KG/M2 | WEIGHT: 179 LBS | HEIGHT: 66 IN | HEART RATE: 73 BPM | RESPIRATION RATE: 16 BRPM | DIASTOLIC BLOOD PRESSURE: 78 MMHG | SYSTOLIC BLOOD PRESSURE: 140 MMHG | TEMPERATURE: 97.1 F | OXYGEN SATURATION: 96 %

## 2022-01-01 VITALS
BODY MASS INDEX: 28.31 KG/M2 | WEIGHT: 176.13 LBS | RESPIRATION RATE: 18 BRPM | HEART RATE: 89 BPM | OXYGEN SATURATION: 95 % | HEIGHT: 66 IN | SYSTOLIC BLOOD PRESSURE: 136 MMHG | DIASTOLIC BLOOD PRESSURE: 70 MMHG | TEMPERATURE: 97.3 F

## 2022-01-01 VITALS
OXYGEN SATURATION: 94 % | DIASTOLIC BLOOD PRESSURE: 78 MMHG | HEIGHT: 66 IN | RESPIRATION RATE: 14 BRPM | TEMPERATURE: 97.9 F | WEIGHT: 173.5 LBS | HEART RATE: 72 BPM | SYSTOLIC BLOOD PRESSURE: 134 MMHG | BODY MASS INDEX: 27.88 KG/M2

## 2022-01-01 VITALS
HEART RATE: 60 BPM | RESPIRATION RATE: 16 BRPM | DIASTOLIC BLOOD PRESSURE: 80 MMHG | TEMPERATURE: 98.2 F | WEIGHT: 174 LBS | OXYGEN SATURATION: 97 % | HEIGHT: 66 IN | BODY MASS INDEX: 27.97 KG/M2 | SYSTOLIC BLOOD PRESSURE: 118 MMHG

## 2022-01-01 VITALS
TEMPERATURE: 98.7 F | WEIGHT: 178 LBS | OXYGEN SATURATION: 97 % | BODY MASS INDEX: 28.61 KG/M2 | RESPIRATION RATE: 16 BRPM | HEART RATE: 92 BPM | DIASTOLIC BLOOD PRESSURE: 64 MMHG | HEIGHT: 66 IN | SYSTOLIC BLOOD PRESSURE: 130 MMHG

## 2022-01-01 VITALS
RESPIRATION RATE: 18 BRPM | SYSTOLIC BLOOD PRESSURE: 150 MMHG | HEIGHT: 65 IN | OXYGEN SATURATION: 97 % | HEART RATE: 97 BPM | DIASTOLIC BLOOD PRESSURE: 99 MMHG | TEMPERATURE: 97 F | BODY MASS INDEX: 29.59 KG/M2 | WEIGHT: 177.6 LBS

## 2022-01-01 VITALS
SYSTOLIC BLOOD PRESSURE: 116 MMHG | TEMPERATURE: 97.1 F | BODY MASS INDEX: 28.61 KG/M2 | DIASTOLIC BLOOD PRESSURE: 81 MMHG | HEIGHT: 66 IN | OXYGEN SATURATION: 99 % | WEIGHT: 178 LBS | HEART RATE: 90 BPM

## 2022-01-01 VITALS
SYSTOLIC BLOOD PRESSURE: 138 MMHG | DIASTOLIC BLOOD PRESSURE: 70 MMHG | HEART RATE: 96 BPM | BODY MASS INDEX: 28.93 KG/M2 | HEIGHT: 66 IN | RESPIRATION RATE: 16 BRPM | WEIGHT: 180 LBS | OXYGEN SATURATION: 96 % | TEMPERATURE: 98.4 F

## 2022-01-01 VITALS
SYSTOLIC BLOOD PRESSURE: 158 MMHG | HEIGHT: 66 IN | WEIGHT: 179 LBS | TEMPERATURE: 97.7 F | OXYGEN SATURATION: 99 % | HEART RATE: 80 BPM | BODY MASS INDEX: 28.77 KG/M2 | DIASTOLIC BLOOD PRESSURE: 94 MMHG

## 2022-01-01 VITALS
OXYGEN SATURATION: 99 % | BODY MASS INDEX: 28.19 KG/M2 | HEART RATE: 94 BPM | SYSTOLIC BLOOD PRESSURE: 136 MMHG | DIASTOLIC BLOOD PRESSURE: 80 MMHG | HEIGHT: 66 IN | TEMPERATURE: 97.5 F | WEIGHT: 175.4 LBS

## 2022-01-01 VITALS — OXYGEN SATURATION: 96 % | HEIGHT: 66 IN | BODY MASS INDEX: 29.05 KG/M2 | WEIGHT: 180.78 LBS

## 2022-01-01 DIAGNOSIS — C34.90 MALIGNANT NEOPLASM OF LUNG, UNSPECIFIED LATERALITY, UNSPECIFIED PART OF LUNG: Primary | ICD-10-CM

## 2022-01-01 DIAGNOSIS — R91.8 LUNG MASS: Primary | ICD-10-CM

## 2022-01-01 DIAGNOSIS — K44.9 GASTROESOPHAGEAL REFLUX DISEASE WITH HIATAL HERNIA: Primary | ICD-10-CM

## 2022-01-01 DIAGNOSIS — R91.1 LUNG NODULE: ICD-10-CM

## 2022-01-01 DIAGNOSIS — Z72.0 TOBACCO ABUSE: ICD-10-CM

## 2022-01-01 DIAGNOSIS — E78.00 HYPERCHOLESTEREMIA: Primary | ICD-10-CM

## 2022-01-01 DIAGNOSIS — R91.8 LUNG MASS: ICD-10-CM

## 2022-01-01 DIAGNOSIS — E03.9 ACQUIRED HYPOTHYROIDISM: Chronic | ICD-10-CM

## 2022-01-01 DIAGNOSIS — G47.33 OSA ON CPAP: Primary | ICD-10-CM

## 2022-01-01 DIAGNOSIS — R91.1 LUNG NODULE: Primary | ICD-10-CM

## 2022-01-01 DIAGNOSIS — K21.9 GASTROESOPHAGEAL REFLUX DISEASE WITH HIATAL HERNIA: Primary | ICD-10-CM

## 2022-01-01 DIAGNOSIS — R07.81 RIB PAIN: Primary | ICD-10-CM

## 2022-01-01 DIAGNOSIS — Z01.812 BLOOD TESTS PRIOR TO TREATMENT OR PROCEDURE: Primary | ICD-10-CM

## 2022-01-01 DIAGNOSIS — G47.33 OSA ON CPAP: ICD-10-CM

## 2022-01-01 DIAGNOSIS — E78.00 HYPERCHOLESTEREMIA: ICD-10-CM

## 2022-01-01 DIAGNOSIS — E03.9 ACQUIRED HYPOTHYROIDISM: ICD-10-CM

## 2022-01-01 DIAGNOSIS — Z12.5 ENCOUNTER FOR SCREENING FOR MALIGNANT NEOPLASM OF PROSTATE: ICD-10-CM

## 2022-01-01 DIAGNOSIS — M62.838 MUSCLE SPASM: ICD-10-CM

## 2022-01-01 DIAGNOSIS — Z72.0 TOBACCO ABUSE: Primary | ICD-10-CM

## 2022-01-01 DIAGNOSIS — M19.90 ARTHRITIS: ICD-10-CM

## 2022-01-01 DIAGNOSIS — E78.00 HYPERCHOLESTEROLEMIA: ICD-10-CM

## 2022-01-01 DIAGNOSIS — Z90.2 S/P LOBECTOMY OF LUNG: ICD-10-CM

## 2022-01-01 DIAGNOSIS — Z01.818 PRE-OP TESTING: Primary | ICD-10-CM

## 2022-01-01 DIAGNOSIS — Z99.89 OSA ON CPAP: Primary | ICD-10-CM

## 2022-01-01 DIAGNOSIS — R91.8 ABNORMAL CT SCAN OF LUNG: ICD-10-CM

## 2022-01-01 DIAGNOSIS — L30.8 OTHER ECZEMA: ICD-10-CM

## 2022-01-01 DIAGNOSIS — Z99.89 OSA ON CPAP: ICD-10-CM

## 2022-01-01 DIAGNOSIS — M54.50 ACUTE RIGHT-SIDED LOW BACK PAIN WITHOUT SCIATICA: ICD-10-CM

## 2022-01-01 DIAGNOSIS — R07.9 CHEST PAIN SYNDROME: Primary | ICD-10-CM

## 2022-01-01 DIAGNOSIS — E78.5 DYSLIPIDEMIA: ICD-10-CM

## 2022-01-01 DIAGNOSIS — R07.81 RIB PAIN: ICD-10-CM

## 2022-01-01 DIAGNOSIS — N28.9 ACUTE RENAL INSUFFICIENCY: ICD-10-CM

## 2022-01-01 DIAGNOSIS — G89.18 PAIN AT SURGICAL SITE: ICD-10-CM

## 2022-01-01 DIAGNOSIS — R79.9 ABNORMAL FINDING OF BLOOD CHEMISTRY, UNSPECIFIED: ICD-10-CM

## 2022-01-01 DIAGNOSIS — I10 BENIGN HYPERTENSION: Primary | ICD-10-CM

## 2022-01-01 DIAGNOSIS — J43.1 PANLOBULAR EMPHYSEMA: ICD-10-CM

## 2022-01-01 DIAGNOSIS — I49.5 SICK SINUS SYNDROME: ICD-10-CM

## 2022-01-01 DIAGNOSIS — Z01.818 PRE-OP TESTING: ICD-10-CM

## 2022-01-01 DIAGNOSIS — I10 BENIGN HYPERTENSION: ICD-10-CM

## 2022-01-01 DIAGNOSIS — R63.4 WEIGHT LOSS: ICD-10-CM

## 2022-01-01 LAB
A PHAGOCYTOPH IGG TITR SER IF: NEGATIVE {TITER}
A PHAGOCYTOPH IGM TITR SER IF: NEGATIVE {TITER}
ABO GROUP BLD: NORMAL
ABO GROUP BLD: NORMAL
ALBUMIN SERPL-MCNC: 4.2 G/DL (ref 3.8–4.8)
ALBUMIN SERPL-MCNC: 4.3 G/DL (ref 3.5–5.2)
ALBUMIN/GLOB SERPL: 1.8 {RATIO} (ref 1.2–2.2)
ALP SERPL-CCNC: 102 U/L (ref 39–117)
ALP SERPL-CCNC: 107 IU/L (ref 44–121)
ALT SERPL W P-5'-P-CCNC: 9 U/L (ref 1–41)
ALT SERPL-CCNC: 10 IU/L (ref 0–44)
ANION GAP SERPL CALCULATED.3IONS-SCNC: 10 MMOL/L (ref 5–15)
ANION GAP SERPL CALCULATED.3IONS-SCNC: 13 MMOL/L (ref 5–15)
ANION GAP SERPL CALCULATED.3IONS-SCNC: 9 MMOL/L (ref 5–15)
AST SERPL-CCNC: 18 IU/L (ref 0–40)
AST SERPL-CCNC: 19 U/L (ref 1–40)
B BURGDOR DNA SPEC QL NAA+PROBE: NEGATIVE
BASOPHILS # BLD AUTO: 0.04 10*3/MM3 (ref 0–0.2)
BASOPHILS # BLD AUTO: 0.05 10*3/MM3 (ref 0–0.2)
BASOPHILS # BLD AUTO: 0.07 10*3/MM3 (ref 0–0.2)
BASOPHILS # BLD AUTO: 0.08 10*3/MM3 (ref 0–0.2)
BASOPHILS # BLD AUTO: 0.1 10*3/MM3 (ref 0–0.2)
BASOPHILS # BLD AUTO: 0.1 X10E3/UL (ref 0–0.2)
BASOPHILS NFR BLD AUTO: 0.2 % (ref 0–1.5)
BASOPHILS NFR BLD AUTO: 0.6 % (ref 0–1.5)
BASOPHILS NFR BLD AUTO: 0.6 % (ref 0–1.5)
BASOPHILS NFR BLD AUTO: 1 %
BASOPHILS NFR BLD AUTO: 1 % (ref 0–1.5)
BASOPHILS NFR BLD AUTO: 1.1 % (ref 0–1.5)
BH BB BLOOD EXPIRATION DATE: NORMAL
BH BB BLOOD EXPIRATION DATE: NORMAL
BH BB BLOOD TYPE BARCODE: 5100
BH BB BLOOD TYPE BARCODE: 5100
BH BB DISPENSE STATUS: NORMAL
BH BB DISPENSE STATUS: NORMAL
BH BB PRODUCT CODE: NORMAL
BH BB PRODUCT CODE: NORMAL
BH BB UNIT NUMBER: NORMAL
BH BB UNIT NUMBER: NORMAL
BILIRUB CONJ SERPL-MCNC: <0.2 MG/DL (ref 0–0.3)
BILIRUB INDIRECT SERPL-MCNC: NORMAL MG/DL
BILIRUB SERPL-MCNC: 0.2 MG/DL (ref 0–1.2)
BILIRUB SERPL-MCNC: 0.3 MG/DL (ref 0–1.2)
BLD GP AB SCN SERPL QL: NEGATIVE
BUN SERPL-MCNC: 18 MG/DL (ref 8–23)
BUN SERPL-MCNC: 20 MG/DL (ref 8–27)
BUN SERPL-MCNC: 21 MG/DL (ref 8–23)
BUN SERPL-MCNC: 22 MG/DL (ref 8–23)
BUN SERPL-MCNC: 23 MG/DL (ref 8–23)
BUN SERPL-MCNC: 26 MG/DL (ref 8–23)
BUN/CREAT SERPL: 16 (ref 10–24)
BUN/CREAT SERPL: 16.5 (ref 7–25)
BUN/CREAT SERPL: 16.9 (ref 7–25)
BUN/CREAT SERPL: 18.6 (ref 7–25)
BUN/CREAT SERPL: 19.3 (ref 7–25)
BUN/CREAT SERPL: 20.2 (ref 7–25)
CALCIUM SERPL-MCNC: 8.9 MG/DL (ref 8.6–10.2)
CALCIUM SPEC-SCNC: 8.4 MG/DL (ref 8.6–10.5)
CALCIUM SPEC-SCNC: 8.6 MG/DL (ref 8.6–10.5)
CALCIUM SPEC-SCNC: 8.8 MG/DL (ref 8.6–10.5)
CALCIUM SPEC-SCNC: 8.9 MG/DL (ref 8.6–10.5)
CALCIUM SPEC-SCNC: 9.1 MG/DL (ref 8.6–10.5)
CCP IGA+IGG SERPL IA-ACNC: 4 UNITS (ref 0–19)
CHLORIDE SERPL-SCNC: 100 MMOL/L (ref 98–107)
CHLORIDE SERPL-SCNC: 100 MMOL/L (ref 98–107)
CHLORIDE SERPL-SCNC: 102 MMOL/L (ref 98–107)
CHLORIDE SERPL-SCNC: 103 MMOL/L (ref 98–107)
CHLORIDE SERPL-SCNC: 107 MMOL/L (ref 98–107)
CHLORIDE SERPL-SCNC: 98 MMOL/L (ref 96–106)
CHOLEST SERPL-MCNC: 137 MG/DL (ref 100–199)
CO2 SERPL-SCNC: 19 MMOL/L (ref 22–29)
CO2 SERPL-SCNC: 21 MMOL/L (ref 20–29)
CO2 SERPL-SCNC: 24 MMOL/L (ref 22–29)
CO2 SERPL-SCNC: 25 MMOL/L (ref 22–29)
CO2 SERPL-SCNC: 27 MMOL/L (ref 22–29)
CO2 SERPL-SCNC: 29 MMOL/L (ref 22–29)
CREAT SERPL-MCNC: 1.09 MG/DL (ref 0.76–1.27)
CREAT SERPL-MCNC: 1.13 MG/DL (ref 0.76–1.27)
CREAT SERPL-MCNC: 1.14 MG/DL (ref 0.76–1.27)
CREAT SERPL-MCNC: 1.22 MG/DL (ref 0.76–1.27)
CREAT SERPL-MCNC: 1.3 MG/DL (ref 0.76–1.27)
CREAT SERPL-MCNC: 1.35 MG/DL (ref 0.76–1.27)
CROSSMATCH INTERPRETATION: NORMAL
CROSSMATCH INTERPRETATION: NORMAL
CRP SERPL-MCNC: 30 MG/L (ref 0–10)
CYTO UR: NORMAL
CYTO UR: NORMAL
DEPRECATED RDW RBC AUTO: 40.1 FL (ref 37–54)
DEPRECATED RDW RBC AUTO: 46.7 FL (ref 37–54)
DEPRECATED RDW RBC AUTO: 47.5 FL (ref 37–54)
DEPRECATED RDW RBC AUTO: 48 FL (ref 37–54)
DEPRECATED RDW RBC AUTO: 49.9 FL (ref 37–54)
E CHAFFEENSIS IGG TITR SER IF: NEGATIVE {TITER}
E CHAFFEENSIS IGM TITR SER IF: NEGATIVE {TITER}
EGFRCR SERPLBLD CKD-EPI 2021: 59.4 ML/MIN/1.73
EGFRCR SERPLBLD CKD-EPI 2021: 62.1 ML/MIN/1.73
EGFRCR SERPLBLD CKD-EPI 2021: 67 ML/MIN/1.73
EGFRCR SERPLBLD CKD-EPI 2021: 72.7 ML/MIN/1.73
EGFRCR SERPLBLD CKD-EPI 2021: 73.9 ML/MIN/1.73
EGFRCR SERPLBLD CKD-EPI 2021: 76.7 ML/MIN/1.73
EOSINOPHIL # BLD AUTO: 0 10*3/MM3 (ref 0–0.4)
EOSINOPHIL # BLD AUTO: 0.08 10*3/MM3 (ref 0–0.4)
EOSINOPHIL # BLD AUTO: 0.1 X10E3/UL (ref 0–0.4)
EOSINOPHIL # BLD AUTO: 0.2 10*3/MM3 (ref 0–0.4)
EOSINOPHIL # BLD AUTO: 0.25 10*3/MM3 (ref 0–0.4)
EOSINOPHIL # BLD AUTO: 0.33 10*3/MM3 (ref 0–0.4)
EOSINOPHIL NFR BLD AUTO: 0 % (ref 0.3–6.2)
EOSINOPHIL NFR BLD AUTO: 0.6 % (ref 0.3–6.2)
EOSINOPHIL NFR BLD AUTO: 1 %
EOSINOPHIL NFR BLD AUTO: 1.9 % (ref 0.3–6.2)
EOSINOPHIL NFR BLD AUTO: 3.4 % (ref 0.3–6.2)
EOSINOPHIL NFR BLD AUTO: 4.1 % (ref 0.3–6.2)
ERYTHROCYTE [DISTWIDTH] IN BLOOD BY AUTOMATED COUNT: 12.5 % (ref 11.6–15.4)
ERYTHROCYTE [DISTWIDTH] IN BLOOD BY AUTOMATED COUNT: 12.6 % (ref 12.3–15.4)
ERYTHROCYTE [DISTWIDTH] IN BLOOD BY AUTOMATED COUNT: 14 % (ref 12.3–15.4)
ERYTHROCYTE [DISTWIDTH] IN BLOOD BY AUTOMATED COUNT: 14.3 % (ref 12.3–15.4)
ERYTHROCYTE [DISTWIDTH] IN BLOOD BY AUTOMATED COUNT: 14.4 % (ref 12.3–15.4)
ERYTHROCYTE [DISTWIDTH] IN BLOOD BY AUTOMATED COUNT: 14.6 % (ref 12.3–15.4)
ERYTHROCYTE [SEDIMENTATION RATE] IN BLOOD BY WESTERGREN METHOD: 11 MM/HR (ref 0–30)
GLOBULIN SER CALC-MCNC: 2.3 G/DL (ref 1.5–4.5)
GLUCOSE BLDC GLUCOMTR-MCNC: 94 MG/DL (ref 70–130)
GLUCOSE SERPL-MCNC: 100 MG/DL (ref 65–99)
GLUCOSE SERPL-MCNC: 100 MG/DL (ref 65–99)
GLUCOSE SERPL-MCNC: 108 MG/DL (ref 65–99)
GLUCOSE SERPL-MCNC: 118 MG/DL (ref 65–99)
GLUCOSE SERPL-MCNC: 153 MG/DL (ref 65–99)
GLUCOSE SERPL-MCNC: 93 MG/DL (ref 65–99)
HBA1C MFR BLD: 5.7 % (ref 4.8–5.6)
HBA1C MFR BLD: 5.7 % (ref 4.8–5.6)
HCT VFR BLD AUTO: 38.6 % (ref 37.5–51)
HCT VFR BLD AUTO: 39.2 % (ref 37.5–51)
HCT VFR BLD AUTO: 40.7 % (ref 37.5–51)
HCT VFR BLD AUTO: 41.4 % (ref 37.5–51)
HCT VFR BLD AUTO: 42.6 % (ref 37.5–51)
HCT VFR BLD AUTO: 43.3 % (ref 37.5–51)
HCT VFR BLD AUTO: 45 % (ref 37.5–51)
HDLC SERPL-MCNC: 42 MG/DL
HGB BLD-MCNC: 13.1 G/DL (ref 13–17.7)
HGB BLD-MCNC: 13.2 G/DL (ref 13–17.7)
HGB BLD-MCNC: 13.4 G/DL (ref 13–17.7)
HGB BLD-MCNC: 13.4 G/DL (ref 13–17.7)
HGB BLD-MCNC: 14.4 G/DL (ref 13–17.7)
HGB BLD-MCNC: 14.4 G/DL (ref 13–17.7)
HGB BLD-MCNC: 15 G/DL (ref 13–17.7)
IMM GRANULOCYTES # BLD AUTO: 0 X10E3/UL (ref 0–0.1)
IMM GRANULOCYTES # BLD AUTO: 0.02 10*3/MM3 (ref 0–0.05)
IMM GRANULOCYTES # BLD AUTO: 0.02 10*3/MM3 (ref 0–0.05)
IMM GRANULOCYTES # BLD AUTO: 0.03 10*3/MM3 (ref 0–0.05)
IMM GRANULOCYTES # BLD AUTO: 0.05 10*3/MM3 (ref 0–0.05)
IMM GRANULOCYTES # BLD AUTO: 0.07 10*3/MM3 (ref 0–0.05)
IMM GRANULOCYTES NFR BLD AUTO: 0 %
IMM GRANULOCYTES NFR BLD AUTO: 0.2 % (ref 0–0.5)
IMM GRANULOCYTES NFR BLD AUTO: 0.3 % (ref 0–0.5)
IMM GRANULOCYTES NFR BLD AUTO: 0.3 % (ref 0–0.5)
IMM GRANULOCYTES NFR BLD AUTO: 0.4 % (ref 0–0.5)
IMM GRANULOCYTES NFR BLD AUTO: 0.4 % (ref 0–0.5)
INR PPP: 0.93 (ref 0.84–1.13)
INR PPP: 0.97 (ref 0.84–1.13)
LAB AP CASE REPORT: NORMAL
LAB AP CASE REPORT: NORMAL
LAB AP CLINICAL INFORMATION: NORMAL
LAB AP CLINICAL INFORMATION: NORMAL
LAB AP DIAGNOSIS COMMENT: NORMAL
LDLC SERPL CALC-MCNC: 80 MG/DL (ref 0–99)
LYMPHOCYTES # BLD AUTO: 0.38 10*3/MM3 (ref 0.7–3.1)
LYMPHOCYTES # BLD AUTO: 1.2 X10E3/UL (ref 0.7–3.1)
LYMPHOCYTES # BLD AUTO: 1.47 10*3/MM3 (ref 0.7–3.1)
LYMPHOCYTES # BLD AUTO: 1.57 10*3/MM3 (ref 0.7–3.1)
LYMPHOCYTES # BLD AUTO: 1.62 10*3/MM3 (ref 0.7–3.1)
LYMPHOCYTES # BLD AUTO: 1.77 10*3/MM3 (ref 0.7–3.1)
LYMPHOCYTES NFR BLD AUTO: 13 % (ref 19.6–45.3)
LYMPHOCYTES NFR BLD AUTO: 14.1 % (ref 19.6–45.3)
LYMPHOCYTES NFR BLD AUTO: 17 %
LYMPHOCYTES NFR BLD AUTO: 2.2 % (ref 19.6–45.3)
LYMPHOCYTES NFR BLD AUTO: 21.3 % (ref 19.6–45.3)
LYMPHOCYTES NFR BLD AUTO: 21.9 % (ref 19.6–45.3)
Lab: NORMAL
MAGNESIUM SERPL-MCNC: 1.8 MG/DL (ref 1.6–2.4)
MAGNESIUM SERPL-MCNC: 2.1 MG/DL (ref 1.6–2.4)
MCH RBC QN AUTO: 29.4 PG (ref 26.6–33)
MCH RBC QN AUTO: 29.8 PG (ref 26.6–33)
MCH RBC QN AUTO: 30.1 PG (ref 26.6–33)
MCH RBC QN AUTO: 30.1 PG (ref 26.6–33)
MCH RBC QN AUTO: 30.5 PG (ref 26.6–33)
MCH RBC QN AUTO: 30.6 PG (ref 26.6–33)
MCHC RBC AUTO-ENTMCNC: 31.6 G/DL (ref 31.5–35.7)
MCHC RBC AUTO-ENTMCNC: 32.9 G/DL (ref 31.5–35.7)
MCHC RBC AUTO-ENTMCNC: 33.3 G/DL (ref 31.5–35.7)
MCHC RBC AUTO-ENTMCNC: 33.3 G/DL (ref 31.5–35.7)
MCHC RBC AUTO-ENTMCNC: 34.2 G/DL (ref 31.5–35.7)
MCHC RBC AUTO-ENTMCNC: 34.2 G/DL (ref 31.5–35.7)
MCV RBC AUTO: 87.1 FL (ref 79–97)
MCV RBC AUTO: 89.1 FL (ref 79–97)
MCV RBC AUTO: 90 FL (ref 79–97)
MCV RBC AUTO: 91.5 FL (ref 79–97)
MCV RBC AUTO: 91.9 FL (ref 79–97)
MCV RBC AUTO: 93 FL (ref 79–97)
MICROALBUMIN UR-MCNC: 29.4 UG/ML
MONOCYTES # BLD AUTO: 0.6 X10E3/UL (ref 0.1–0.9)
MONOCYTES # BLD AUTO: 0.61 10*3/MM3 (ref 0.1–0.9)
MONOCYTES # BLD AUTO: 0.64 10*3/MM3 (ref 0.1–0.9)
MONOCYTES # BLD AUTO: 0.8 10*3/MM3 (ref 0.1–0.9)
MONOCYTES # BLD AUTO: 0.85 10*3/MM3 (ref 0.1–0.9)
MONOCYTES # BLD AUTO: 1.12 10*3/MM3 (ref 0.1–0.9)
MONOCYTES NFR BLD AUTO: 10.5 % (ref 5–12)
MONOCYTES NFR BLD AUTO: 3.5 % (ref 5–12)
MONOCYTES NFR BLD AUTO: 7.7 % (ref 5–12)
MONOCYTES NFR BLD AUTO: 8.7 % (ref 5–12)
MONOCYTES NFR BLD AUTO: 9 %
MONOCYTES NFR BLD AUTO: 9 % (ref 5–12)
NEUTROPHILS # BLD AUTO: 5 X10E3/UL (ref 1.4–7)
NEUTROPHILS NFR BLD AUTO: 16.46 10*3/MM3 (ref 1.7–7)
NEUTROPHILS NFR BLD AUTO: 4.8 10*3/MM3 (ref 1.7–7)
NEUTROPHILS NFR BLD AUTO: 5.05 10*3/MM3 (ref 1.7–7)
NEUTROPHILS NFR BLD AUTO: 62.7 % (ref 42.7–76)
NEUTROPHILS NFR BLD AUTO: 65.2 % (ref 42.7–76)
NEUTROPHILS NFR BLD AUTO: 7.84 10*3/MM3 (ref 1.7–7)
NEUTROPHILS NFR BLD AUTO: 72 %
NEUTROPHILS NFR BLD AUTO: 75 % (ref 42.7–76)
NEUTROPHILS NFR BLD AUTO: 76.4 % (ref 42.7–76)
NEUTROPHILS NFR BLD AUTO: 9.48 10*3/MM3 (ref 1.7–7)
NEUTROPHILS NFR BLD AUTO: 93.7 % (ref 42.7–76)
NRBC BLD AUTO-RTO: 0 /100 WBC (ref 0–0.2)
PATH REPORT.FINAL DX SPEC: NORMAL
PATH REPORT.FINAL DX SPEC: NORMAL
PATH REPORT.GROSS SPEC: NORMAL
PATH REPORT.GROSS SPEC: NORMAL
PHOSPHATE SERPL-MCNC: 3.2 MG/DL (ref 2.5–4.5)
PLATELET # BLD AUTO: 183 10*3/MM3 (ref 140–450)
PLATELET # BLD AUTO: 187 10*3/MM3 (ref 140–450)
PLATELET # BLD AUTO: 193 10*3/MM3 (ref 140–450)
PLATELET # BLD AUTO: 207 X10E3/UL (ref 150–450)
PLATELET # BLD AUTO: 219 10*3/MM3 (ref 140–450)
PLATELET # BLD AUTO: 274 10*3/MM3 (ref 140–450)
PMV BLD AUTO: 9 FL (ref 6–12)
PMV BLD AUTO: 9.5 FL (ref 6–12)
PMV BLD AUTO: 9.5 FL (ref 6–12)
PMV BLD AUTO: 9.8 FL (ref 6–12)
PMV BLD AUTO: 9.9 FL (ref 6–12)
POTASSIUM SERPL-SCNC: 3.5 MMOL/L (ref 3.5–5.2)
POTASSIUM SERPL-SCNC: 4.2 MMOL/L (ref 3.5–5.2)
POTASSIUM SERPL-SCNC: 4.3 MMOL/L (ref 3.5–5.2)
POTASSIUM SERPL-SCNC: 4.5 MMOL/L (ref 3.5–5.2)
POTASSIUM SERPL-SCNC: 4.5 MMOL/L (ref 3.5–5.2)
POTASSIUM SERPL-SCNC: 4.7 MMOL/L (ref 3.5–5.2)
PROT SERPL-MCNC: 6.5 G/DL (ref 6–8.5)
PROT SERPL-MCNC: 6.9 G/DL (ref 6–8.5)
PROTHROMBIN TIME: 12.3 SECONDS (ref 11.4–14.4)
PROTHROMBIN TIME: 12.8 SECONDS (ref 11.4–14.4)
PSA SERPL-MCNC: 0.5 NG/ML (ref 0–4)
QT INTERVAL: 362 MS
QT INTERVAL: 374 MS
QTC INTERVAL: 403 MS
QTC INTERVAL: 459 MS
R RICKETTSI IGG SER QL IA: NEGATIVE
R RICKETTSI IGM SER-ACNC: 0.52 INDEX (ref 0–0.89)
RBC # BLD AUTO: 4.33 10*6/MM3 (ref 4.14–5.8)
RBC # BLD AUTO: 4.45 10*6/MM3 (ref 4.14–5.8)
RBC # BLD AUTO: 4.45 10*6/MM3 (ref 4.14–5.8)
RBC # BLD AUTO: 4.5 10*6/MM3 (ref 4.14–5.8)
RBC # BLD AUTO: 4.71 10*6/MM3 (ref 4.14–5.8)
RBC # BLD AUTO: 4.99 X10E6/UL (ref 4.14–5.8)
RH BLD: POSITIVE
RH BLD: POSITIVE
RHEUMATOID FACT SERPL-ACNC: <10 IU/ML
SARS-COV-2 RNA PNL SPEC NAA+PROBE: NOT DETECTED
SODIUM SERPL-SCNC: 134 MMOL/L (ref 136–145)
SODIUM SERPL-SCNC: 135 MMOL/L (ref 134–144)
SODIUM SERPL-SCNC: 136 MMOL/L (ref 136–145)
SODIUM SERPL-SCNC: 138 MMOL/L (ref 136–145)
SODIUM SERPL-SCNC: 139 MMOL/L (ref 136–145)
SODIUM SERPL-SCNC: 141 MMOL/L (ref 136–145)
T&S EXPIRATION DATE: NORMAL
T4 FREE SERPL-MCNC: 2.07 NG/DL (ref 0.82–1.77)
TRIGL SERPL-MCNC: 77 MG/DL (ref 0–149)
TSH SERPL DL<=0.005 MIU/L-ACNC: 0.38 UIU/ML (ref 0.45–4.5)
UNIT  ABO: NORMAL
UNIT  ABO: NORMAL
UNIT  RH: NORMAL
UNIT  RH: NORMAL
URATE SERPL-MCNC: 5.7 MG/DL (ref 3.8–8.4)
VIT B12 SERPL-MCNC: 673 PG/ML (ref 232–1245)
VIT B6 SERPL-MCNC: 6 UG/L (ref 3.4–65.2)
VLDLC SERPL CALC-MCNC: 15 MG/DL (ref 5–40)
WBC # BLD AUTO: 7 X10E3/UL (ref 3.4–10.8)
WBC NRBC COR # BLD: 10.44 10*3/MM3 (ref 3.4–10.8)
WBC NRBC COR # BLD: 12.42 10*3/MM3 (ref 3.4–10.8)
WBC NRBC COR # BLD: 17.56 10*3/MM3 (ref 3.4–10.8)
WBC NRBC COR # BLD: 7.36 10*3/MM3 (ref 3.4–10.8)
WBC NRBC COR # BLD: 8.07 10*3/MM3 (ref 3.4–10.8)

## 2022-01-01 PROCEDURE — 71045 X-RAY EXAM CHEST 1 VIEW: CPT

## 2022-01-01 PROCEDURE — 94761 N-INVAS EAR/PLS OXIMETRY MLT: CPT

## 2022-01-01 PROCEDURE — 99231 SBSQ HOSP IP/OBS SF/LOW 25: CPT | Performed by: NURSE PRACTITIONER

## 2022-01-01 PROCEDURE — 94799 UNLISTED PULMONARY SVC/PX: CPT

## 2022-01-01 PROCEDURE — 99214 OFFICE O/P EST MOD 30 MIN: CPT | Performed by: NURSE PRACTITIONER

## 2022-01-01 PROCEDURE — 78815 PET IMAGE W/CT SKULL-THIGH: CPT

## 2022-01-01 PROCEDURE — 94010 BREATHING CAPACITY TEST: CPT

## 2022-01-01 PROCEDURE — 86900 BLOOD TYPING SEROLOGIC ABO: CPT

## 2022-01-01 PROCEDURE — 93296 REM INTERROG EVL PM/IDS: CPT | Performed by: INTERNAL MEDICINE

## 2022-01-01 PROCEDURE — 80048 BASIC METABOLIC PNL TOTAL CA: CPT | Performed by: PHYSICIAN ASSISTANT

## 2022-01-01 PROCEDURE — 83735 ASSAY OF MAGNESIUM: CPT | Performed by: THORACIC SURGERY (CARDIOTHORACIC VASCULAR SURGERY)

## 2022-01-01 PROCEDURE — 0 LIDOCAINE 1 % SOLUTION: Performed by: RADIOLOGY

## 2022-01-01 PROCEDURE — 25010000002 ONDANSETRON PER 1 MG: Performed by: PHYSICIAN ASSISTANT

## 2022-01-01 PROCEDURE — 85610 PROTHROMBIN TIME: CPT | Performed by: RADIOLOGY

## 2022-01-01 PROCEDURE — 83036 HEMOGLOBIN GLYCOSYLATED A1C: CPT

## 2022-01-01 PROCEDURE — 32663 THORACOSCOPY W/LOBECTOMY: CPT | Performed by: PHYSICIAN ASSISTANT

## 2022-01-01 PROCEDURE — 88309 TISSUE EXAM BY PATHOLOGIST: CPT | Performed by: THORACIC SURGERY (CARDIOTHORACIC VASCULAR SURGERY)

## 2022-01-01 PROCEDURE — 85025 COMPLETE CBC W/AUTO DIFF WBC: CPT

## 2022-01-01 PROCEDURE — 84100 ASSAY OF PHOSPHORUS: CPT | Performed by: NURSE PRACTITIONER

## 2022-01-01 PROCEDURE — 88333 PATH CONSLTJ SURG CYTO XM 1: CPT | Performed by: THORACIC SURGERY (CARDIOTHORACIC VASCULAR SURGERY)

## 2022-01-01 PROCEDURE — 25010000002 NEOSTIGMINE 10 MG/10ML SOLUTION: Performed by: ANESTHESIOLOGY

## 2022-01-01 PROCEDURE — C9803 HOPD COVID-19 SPEC COLLECT: HCPCS

## 2022-01-01 PROCEDURE — 99024 POSTOP FOLLOW-UP VISIT: CPT | Performed by: PHYSICIAN ASSISTANT

## 2022-01-01 PROCEDURE — 25010000002 FENTANYL CITRATE (PF) 50 MCG/ML SOLUTION

## 2022-01-01 PROCEDURE — 25010000002 FENTANYL CITRATE (PF) 50 MCG/ML SOLUTION: Performed by: RADIOLOGY

## 2022-01-01 PROCEDURE — 25010000002 MORPHINE PER 10 MG: Performed by: STUDENT IN AN ORGANIZED HEALTH CARE EDUCATION/TRAINING PROGRAM

## 2022-01-01 PROCEDURE — 88307 TISSUE EXAM BY PATHOLOGIST: CPT | Performed by: INTERNAL MEDICINE

## 2022-01-01 PROCEDURE — 88341 IMHCHEM/IMCYTCHM EA ADD ANTB: CPT | Performed by: INTERNAL MEDICINE

## 2022-01-01 PROCEDURE — 93294 REM INTERROG EVL PM/LDLS PM: CPT | Performed by: INTERNAL MEDICINE

## 2022-01-01 PROCEDURE — 25010000002 HEPARIN (PORCINE) PER 1000 UNITS: Performed by: STUDENT IN AN ORGANIZED HEALTH CARE EDUCATION/TRAINING PROGRAM

## 2022-01-01 PROCEDURE — A9552 F18 FDG: HCPCS | Performed by: NURSE PRACTITIONER

## 2022-01-01 PROCEDURE — 25010000002 MAGNESIUM SULFATE 2 GM/50ML SOLUTION: Performed by: NURSE PRACTITIONER

## 2022-01-01 PROCEDURE — 99213 OFFICE O/P EST LOW 20 MIN: CPT | Performed by: INTERNAL MEDICINE

## 2022-01-01 PROCEDURE — 85025 COMPLETE CBC W/AUTO DIFF WBC: CPT | Performed by: INTERNAL MEDICINE

## 2022-01-01 PROCEDURE — 0BTD4ZZ RESECTION OF RIGHT MIDDLE LUNG LOBE, PERCUTANEOUS ENDOSCOPIC APPROACH: ICD-10-PCS | Performed by: THORACIC SURGERY (CARDIOTHORACIC VASCULAR SURGERY)

## 2022-01-01 PROCEDURE — 71046 X-RAY EXAM CHEST 2 VIEWS: CPT

## 2022-01-01 PROCEDURE — 94010 BREATHING CAPACITY TEST: CPT | Performed by: INTERNAL MEDICINE

## 2022-01-01 PROCEDURE — 71250 CT THORAX DX C-: CPT

## 2022-01-01 PROCEDURE — 93010 ELECTROCARDIOGRAM REPORT: CPT | Performed by: INTERNAL MEDICINE

## 2022-01-01 PROCEDURE — 25010000002 CEFAZOLIN IN DEXTROSE 2-4 GM/100ML-% SOLUTION: Performed by: PHYSICIAN ASSISTANT

## 2022-01-01 PROCEDURE — 99214 OFFICE O/P EST MOD 30 MIN: CPT | Performed by: INTERNAL MEDICINE

## 2022-01-01 PROCEDURE — 36415 COLL VENOUS BLD VENIPUNCTURE: CPT

## 2022-01-01 PROCEDURE — 07B74ZZ EXCISION OF THORAX LYMPHATIC, PERCUTANEOUS ENDOSCOPIC APPROACH: ICD-10-PCS | Performed by: THORACIC SURGERY (CARDIOTHORACIC VASCULAR SURGERY)

## 2022-01-01 PROCEDURE — 80048 BASIC METABOLIC PNL TOTAL CA: CPT | Performed by: INTERNAL MEDICINE

## 2022-01-01 PROCEDURE — 85014 HEMATOCRIT: CPT | Performed by: STUDENT IN AN ORGANIZED HEALTH CARE EDUCATION/TRAINING PROGRAM

## 2022-01-01 PROCEDURE — 99204 OFFICE O/P NEW MOD 45 MIN: CPT | Performed by: THORACIC SURGERY (CARDIOTHORACIC VASCULAR SURGERY)

## 2022-01-01 PROCEDURE — 93280 PM DEVICE PROGR EVAL DUAL: CPT | Performed by: INTERNAL MEDICINE

## 2022-01-01 PROCEDURE — 99232 SBSQ HOSP IP/OBS MODERATE 35: CPT | Performed by: INTERNAL MEDICINE

## 2022-01-01 PROCEDURE — 25010000002 DEXAMETHASONE PER 1 MG: Performed by: ANESTHESIOLOGY

## 2022-01-01 PROCEDURE — 74018 RADEX ABDOMEN 1 VIEW: CPT

## 2022-01-01 PROCEDURE — 85025 COMPLETE CBC W/AUTO DIFF WBC: CPT | Performed by: RADIOLOGY

## 2022-01-01 PROCEDURE — 83735 ASSAY OF MAGNESIUM: CPT | Performed by: NURSE PRACTITIONER

## 2022-01-01 PROCEDURE — 99024 POSTOP FOLLOW-UP VISIT: CPT | Performed by: STUDENT IN AN ORGANIZED HEALTH CARE EDUCATION/TRAINING PROGRAM

## 2022-01-01 PROCEDURE — 88331 PATH CONSLTJ SURG 1 BLK 1SPC: CPT | Performed by: THORACIC SURGERY (CARDIOTHORACIC VASCULAR SURGERY)

## 2022-01-01 PROCEDURE — 25010000002 MORPHINE PER 10 MG: Performed by: THORACIC SURGERY (CARDIOTHORACIC VASCULAR SURGERY)

## 2022-01-01 PROCEDURE — C2618 PROBE/NEEDLE, CRYO: HCPCS | Performed by: THORACIC SURGERY (CARDIOTHORACIC VASCULAR SURGERY)

## 2022-01-01 PROCEDURE — 99204 OFFICE O/P NEW MOD 45 MIN: CPT | Performed by: INTERNAL MEDICINE

## 2022-01-01 PROCEDURE — 25010000002 CEFAZOLIN IN DEXTROSE 2-4 GM/100ML-% SOLUTION

## 2022-01-01 PROCEDURE — 25010000002 FUROSEMIDE PER 20 MG: Performed by: PHYSICIAN ASSISTANT

## 2022-01-01 PROCEDURE — A9270 NON-COVERED ITEM OR SERVICE: HCPCS | Performed by: THORACIC SURGERY (CARDIOTHORACIC VASCULAR SURGERY)

## 2022-01-01 PROCEDURE — 94060 EVALUATION OF WHEEZING: CPT | Performed by: INTERNAL MEDICINE

## 2022-01-01 PROCEDURE — 25010000002 HYDROMORPHONE 1 MG/ML SOLUTION

## 2022-01-01 PROCEDURE — 80048 BASIC METABOLIC PNL TOTAL CA: CPT

## 2022-01-01 PROCEDURE — 25010000002 HYDROMORPHONE PER 4 MG: Performed by: ANESTHESIOLOGY

## 2022-01-01 PROCEDURE — 94060 EVALUATION OF WHEEZING: CPT

## 2022-01-01 PROCEDURE — 25010000002 PROPOFOL 10 MG/ML EMULSION: Performed by: ANESTHESIOLOGY

## 2022-01-01 PROCEDURE — 25010000002 PROCHLORPERAZINE 10 MG/2ML SOLUTION: Performed by: NURSE PRACTITIONER

## 2022-01-01 PROCEDURE — 86901 BLOOD TYPING SEROLOGIC RH(D): CPT

## 2022-01-01 PROCEDURE — 25010000002 MIDAZOLAM PER 1 MG: Performed by: RADIOLOGY

## 2022-01-01 PROCEDURE — 93005 ELECTROCARDIOGRAM TRACING: CPT

## 2022-01-01 PROCEDURE — 71046 X-RAY EXAM CHEST 2 VIEWS: CPT | Performed by: NURSE PRACTITIONER

## 2022-01-01 PROCEDURE — 25010000002 ONDANSETRON PER 1 MG: Performed by: ANESTHESIOLOGY

## 2022-01-01 PROCEDURE — 94664 DEMO&/EVAL PT USE INHALER: CPT

## 2022-01-01 PROCEDURE — 63710000001 ALBUTEROL SULFATE HFA 108 (90 BASE) MCG/ACT AEROSOL SOLUTION 8.5 G INHALER: Performed by: THORACIC SURGERY (CARDIOTHORACIC VASCULAR SURGERY)

## 2022-01-01 PROCEDURE — 25010000002 BUPRENORPHINE PER 0.1 MG: Performed by: ANESTHESIOLOGY

## 2022-01-01 PROCEDURE — 94640 AIRWAY INHALATION TREATMENT: CPT

## 2022-01-01 PROCEDURE — 99024 POSTOP FOLLOW-UP VISIT: CPT

## 2022-01-01 PROCEDURE — 32663 THORACOSCOPY W/LOBECTOMY: CPT | Performed by: THORACIC SURGERY (CARDIOTHORACIC VASCULAR SURGERY)

## 2022-01-01 PROCEDURE — U0004 COV-19 TEST NON-CDC HGH THRU: HCPCS

## 2022-01-01 PROCEDURE — 88305 TISSUE EXAM BY PATHOLOGIST: CPT | Performed by: THORACIC SURGERY (CARDIOTHORACIC VASCULAR SURGERY)

## 2022-01-01 PROCEDURE — 31622 DX BRONCHOSCOPE/WASH: CPT | Performed by: THORACIC SURGERY (CARDIOTHORACIC VASCULAR SURGERY)

## 2022-01-01 PROCEDURE — 94726 PLETHYSMOGRAPHY LUNG VOLUMES: CPT | Performed by: INTERNAL MEDICINE

## 2022-01-01 PROCEDURE — 88312 SPECIAL STAINS GROUP 1: CPT | Performed by: THORACIC SURGERY (CARDIOTHORACIC VASCULAR SURGERY)

## 2022-01-01 PROCEDURE — 85018 HEMOGLOBIN: CPT | Performed by: STUDENT IN AN ORGANIZED HEALTH CARE EDUCATION/TRAINING PROGRAM

## 2022-01-01 PROCEDURE — 80048 BASIC METABOLIC PNL TOTAL CA: CPT | Performed by: RADIOLOGY

## 2022-01-01 PROCEDURE — 0 MEPERIDINE PER 100 MG

## 2022-01-01 PROCEDURE — 0BJ08ZZ INSPECTION OF TRACHEOBRONCHIAL TREE, VIA NATURAL OR ARTIFICIAL OPENING ENDOSCOPIC: ICD-10-PCS | Performed by: THORACIC SURGERY (CARDIOTHORACIC VASCULAR SURGERY)

## 2022-01-01 PROCEDURE — 94726 PLETHYSMOGRAPHY LUNG VOLUMES: CPT

## 2022-01-01 PROCEDURE — 94729 DIFFUSING CAPACITY: CPT | Performed by: INTERNAL MEDICINE

## 2022-01-01 PROCEDURE — 99024 POSTOP FOLLOW-UP VISIT: CPT | Performed by: NURSE PRACTITIONER

## 2022-01-01 PROCEDURE — 85610 PROTHROMBIN TIME: CPT

## 2022-01-01 PROCEDURE — 32674 THORACOSCOPY LYMPH NODE EXC: CPT | Performed by: PHYSICIAN ASSISTANT

## 2022-01-01 PROCEDURE — 85025 COMPLETE CBC W/AUTO DIFF WBC: CPT | Performed by: PHYSICIAN ASSISTANT

## 2022-01-01 PROCEDURE — 80076 HEPATIC FUNCTION PANEL: CPT

## 2022-01-01 PROCEDURE — 32674 THORACOSCOPY LYMPH NODE EXC: CPT | Performed by: THORACIC SURGERY (CARDIOTHORACIC VASCULAR SURGERY)

## 2022-01-01 PROCEDURE — 25010000002 FENTANYL CITRATE (PF) 50 MCG/ML SOLUTION: Performed by: ANESTHESIOLOGY

## 2022-01-01 PROCEDURE — 0 FLUDEOXYGLUCOSE F18 SOLUTION: Performed by: NURSE PRACTITIONER

## 2022-01-01 PROCEDURE — 86923 COMPATIBILITY TEST ELECTRIC: CPT

## 2022-01-01 PROCEDURE — 82962 GLUCOSE BLOOD TEST: CPT

## 2022-01-01 PROCEDURE — 88342 IMHCHEM/IMCYTCHM 1ST ANTB: CPT | Performed by: INTERNAL MEDICINE

## 2022-01-01 PROCEDURE — 94729 DIFFUSING CAPACITY: CPT

## 2022-01-01 PROCEDURE — 86850 RBC ANTIBODY SCREEN: CPT

## 2022-01-01 PROCEDURE — 99152 MOD SED SAME PHYS/QHP 5/>YRS: CPT

## 2022-01-01 PROCEDURE — 25010000002 HEPARIN (PORCINE) PER 1000 UNITS: Performed by: PHYSICIAN ASSISTANT

## 2022-01-01 PROCEDURE — 85025 COMPLETE CBC W/AUTO DIFF WBC: CPT | Performed by: NURSE PRACTITIONER

## 2022-01-01 PROCEDURE — 99153 MOD SED SAME PHYS/QHP EA: CPT

## 2022-01-01 PROCEDURE — 25010000002 KETOROLAC TROMETHAMINE PER 15 MG

## 2022-01-01 DEVICE — BLACK INTELLIGENT RELOAD
Type: IMPLANTABLE DEVICE | Site: LUNG | Status: FUNCTIONAL
Brand: TRI-STAPLE 2.0

## 2022-01-01 DEVICE — ARTICULATING RELOAD WITH TRI-STAPLE TECHNOLOGY
Type: IMPLANTABLE DEVICE | Site: LUNG | Status: FUNCTIONAL
Brand: ENDO GIA

## 2022-01-01 DEVICE — ARTICULATION RELOAD WITH TRI-STAPLE TECHNOLOGY
Type: IMPLANTABLE DEVICE | Site: LUNG | Status: FUNCTIONAL
Brand: ENDO GIA

## 2022-01-01 DEVICE — CURVED TIP INTELLIGENT RELOAD AND INTRODUCER
Type: IMPLANTABLE DEVICE | Site: LUNG | Status: FUNCTIONAL
Brand: TRI-STAPLE 2.0

## 2022-01-01 RX ORDER — MORPHINE SULFATE 30 MG/1
30 TABLET, FILM COATED, EXTENDED RELEASE ORAL 2 TIMES DAILY
Status: DISCONTINUED | OUTPATIENT
Start: 2022-01-01 | End: 2022-01-01 | Stop reason: HOSPADM

## 2022-01-01 RX ORDER — FENTANYL CITRATE 50 UG/ML
INJECTION, SOLUTION INTRAMUSCULAR; INTRAVENOUS
Status: COMPLETED | OUTPATIENT
Start: 2022-01-01 | End: 2022-01-01

## 2022-01-01 RX ORDER — SODIUM CHLORIDE 0.9 % (FLUSH) 0.9 %
10 SYRINGE (ML) INJECTION EVERY 12 HOURS SCHEDULED
Status: DISCONTINUED | OUTPATIENT
Start: 2022-01-01 | End: 2022-01-01 | Stop reason: HOSPADM

## 2022-01-01 RX ORDER — ACETAMINOPHEN 500 MG
1000 TABLET ORAL ONCE
Status: CANCELLED | OUTPATIENT
Start: 2022-01-01 | End: 2022-01-01

## 2022-01-01 RX ORDER — LIDOCAINE 50 MG/G
1 PATCH TOPICAL EVERY 24 HOURS
Qty: 30 PATCH | Refills: 11 | Status: SHIPPED | OUTPATIENT
Start: 2022-01-01

## 2022-01-01 RX ORDER — FENTANYL CITRATE 50 UG/ML
INJECTION, SOLUTION INTRAMUSCULAR; INTRAVENOUS AS NEEDED
Status: DISCONTINUED | OUTPATIENT
Start: 2022-01-01 | End: 2022-01-01 | Stop reason: SURG

## 2022-01-01 RX ORDER — MAGNESIUM SULFATE HEPTAHYDRATE 40 MG/ML
2 INJECTION, SOLUTION INTRAVENOUS ONCE
Status: COMPLETED | OUTPATIENT
Start: 2022-01-01 | End: 2022-01-01

## 2022-01-01 RX ORDER — SODIUM CHLORIDE, SODIUM LACTATE, POTASSIUM CHLORIDE, CALCIUM CHLORIDE 600; 310; 30; 20 MG/100ML; MG/100ML; MG/100ML; MG/100ML
9 INJECTION, SOLUTION INTRAVENOUS CONTINUOUS
Status: CANCELLED | OUTPATIENT
Start: 2022-01-01

## 2022-01-01 RX ORDER — ROCURONIUM BROMIDE 10 MG/ML
INJECTION, SOLUTION INTRAVENOUS AS NEEDED
Status: DISCONTINUED | OUTPATIENT
Start: 2022-01-01 | End: 2022-01-01 | Stop reason: SURG

## 2022-01-01 RX ORDER — SODIUM CHLORIDE, SODIUM LACTATE, POTASSIUM CHLORIDE, CALCIUM CHLORIDE 600; 310; 30; 20 MG/100ML; MG/100ML; MG/100ML; MG/100ML
INJECTION, SOLUTION INTRAVENOUS CONTINUOUS PRN
Status: DISCONTINUED | OUTPATIENT
Start: 2022-01-01 | End: 2022-01-01

## 2022-01-01 RX ORDER — CHLORHEXIDINE GLUCONATE 500 MG/1
1 CLOTH TOPICAL EVERY 12 HOURS PRN
Status: CANCELLED | OUTPATIENT
Start: 2022-01-01

## 2022-01-01 RX ORDER — MORPHINE SULFATE 2 MG/ML
2 INJECTION, SOLUTION INTRAMUSCULAR; INTRAVENOUS EVERY 4 HOURS PRN
Status: DISCONTINUED | OUTPATIENT
Start: 2022-01-01 | End: 2022-01-01 | Stop reason: HOSPADM

## 2022-01-01 RX ORDER — FENTANYL CITRATE 50 UG/ML
INJECTION, SOLUTION INTRAMUSCULAR; INTRAVENOUS
Status: DISPENSED
Start: 2022-01-01 | End: 2022-01-01

## 2022-01-01 RX ORDER — ONDANSETRON 4 MG/1
4 TABLET, FILM COATED ORAL EVERY 6 HOURS PRN
Status: DISCONTINUED | OUTPATIENT
Start: 2022-01-01 | End: 2022-01-01 | Stop reason: HOSPADM

## 2022-01-01 RX ORDER — HEPARIN SODIUM 5000 [USP'U]/ML
5000 INJECTION, SOLUTION INTRAVENOUS; SUBCUTANEOUS EVERY 12 HOURS SCHEDULED
Status: DISCONTINUED | OUTPATIENT
Start: 2022-01-01 | End: 2022-01-01 | Stop reason: HOSPADM

## 2022-01-01 RX ORDER — MAGNESIUM HYDROXIDE 1200 MG/15ML
LIQUID ORAL AS NEEDED
Status: DISCONTINUED | OUTPATIENT
Start: 2022-01-01 | End: 2022-01-01 | Stop reason: HOSPADM

## 2022-01-01 RX ORDER — ACETAMINOPHEN 500 MG
1000 TABLET ORAL EVERY 8 HOURS SCHEDULED
Status: DISCONTINUED | OUTPATIENT
Start: 2022-01-01 | End: 2022-01-01 | Stop reason: HOSPADM

## 2022-01-01 RX ORDER — MEPERIDINE HYDROCHLORIDE 25 MG/ML
INJECTION INTRAMUSCULAR; INTRAVENOUS; SUBCUTANEOUS
Status: COMPLETED
Start: 2022-01-01 | End: 2022-01-01

## 2022-01-01 RX ORDER — AMOXICILLIN 250 MG
2 CAPSULE ORAL NIGHTLY
Status: DISCONTINUED | OUTPATIENT
Start: 2022-01-01 | End: 2022-01-01 | Stop reason: HOSPADM

## 2022-01-01 RX ORDER — DOCUSATE SODIUM 100 MG/1
200 CAPSULE, LIQUID FILLED ORAL 2 TIMES DAILY
Status: DISCONTINUED | OUTPATIENT
Start: 2022-01-01 | End: 2022-01-01 | Stop reason: HOSPADM

## 2022-01-01 RX ORDER — MIDAZOLAM HYDROCHLORIDE 1 MG/ML
1 INJECTION INTRAMUSCULAR; INTRAVENOUS
Status: DISCONTINUED | OUTPATIENT
Start: 2022-01-01 | End: 2022-01-01 | Stop reason: HOSPADM

## 2022-01-01 RX ORDER — LIDOCAINE HYDROCHLORIDE 10 MG/ML
10 INJECTION, SOLUTION INFILTRATION; PERINEURAL ONCE
Status: COMPLETED | OUTPATIENT
Start: 2022-01-01 | End: 2022-01-01

## 2022-01-01 RX ORDER — BISACODYL 5 MG/1
10 TABLET, DELAYED RELEASE ORAL DAILY PRN
Status: DISCONTINUED | OUTPATIENT
Start: 2022-01-01 | End: 2022-01-01 | Stop reason: HOSPADM

## 2022-01-01 RX ORDER — FAMOTIDINE 10 MG/ML
20 INJECTION, SOLUTION INTRAVENOUS ONCE
Status: CANCELLED | OUTPATIENT
Start: 2022-01-01 | End: 2022-01-01

## 2022-01-01 RX ORDER — ONDANSETRON 2 MG/ML
INJECTION INTRAMUSCULAR; INTRAVENOUS AS NEEDED
Status: DISCONTINUED | OUTPATIENT
Start: 2022-01-01 | End: 2022-01-01 | Stop reason: SURG

## 2022-01-01 RX ORDER — TIZANIDINE 4 MG/1
4 TABLET ORAL EVERY 6 HOURS PRN
Status: DISCONTINUED | OUTPATIENT
Start: 2022-01-01 | End: 2022-01-01 | Stop reason: HOSPADM

## 2022-01-01 RX ORDER — ACETAMINOPHEN 500 MG
1000 TABLET ORAL ONCE
Status: COMPLETED | OUTPATIENT
Start: 2022-01-01 | End: 2022-01-01

## 2022-01-01 RX ORDER — BISACODYL 10 MG
10 SUPPOSITORY, RECTAL RECTAL DAILY PRN
Status: DISCONTINUED | OUTPATIENT
Start: 2022-01-01 | End: 2022-01-01 | Stop reason: SDUPTHER

## 2022-01-01 RX ORDER — ONDANSETRON 2 MG/ML
4 INJECTION INTRAMUSCULAR; INTRAVENOUS EVERY 6 HOURS PRN
Status: DISCONTINUED | OUTPATIENT
Start: 2022-01-01 | End: 2022-01-01 | Stop reason: HOSPADM

## 2022-01-01 RX ORDER — TAMSULOSIN HYDROCHLORIDE 0.4 MG/1
0.4 CAPSULE ORAL DAILY
Status: DISCONTINUED | OUTPATIENT
Start: 2022-01-01 | End: 2022-01-01 | Stop reason: HOSPADM

## 2022-01-01 RX ORDER — LEVOTHYROXINE SODIUM 0.1 MG/1
100 TABLET ORAL DAILY
Status: DISCONTINUED | OUTPATIENT
Start: 2022-01-01 | End: 2022-01-01 | Stop reason: HOSPADM

## 2022-01-01 RX ORDER — IPRATROPIUM BROMIDE AND ALBUTEROL SULFATE 2.5; .5 MG/3ML; MG/3ML
3 SOLUTION RESPIRATORY (INHALATION) EVERY 4 HOURS PRN
Status: DISCONTINUED | OUTPATIENT
Start: 2022-01-01 | End: 2022-01-01 | Stop reason: HOSPADM

## 2022-01-01 RX ORDER — FUROSEMIDE 10 MG/ML
40 INJECTION INTRAMUSCULAR; INTRAVENOUS ONCE
Status: COMPLETED | OUTPATIENT
Start: 2022-01-01 | End: 2022-01-01

## 2022-01-01 RX ORDER — TRIAMCINOLONE ACETONIDE 5 MG/G
1 OINTMENT TOPICAL 2 TIMES DAILY
Qty: 15 G | Refills: 4 | Status: SHIPPED | OUTPATIENT
Start: 2022-01-01 | End: 2022-01-01

## 2022-01-01 RX ORDER — KETOROLAC TROMETHAMINE 30 MG/ML
30 INJECTION, SOLUTION INTRAMUSCULAR; INTRAVENOUS ONCE
Status: CANCELLED | OUTPATIENT
Start: 2022-01-01 | End: 2022-01-01

## 2022-01-01 RX ORDER — LEVOTHYROXINE SODIUM 0.1 MG/1
TABLET ORAL
Qty: 90 TABLET | Refills: 3 | Status: SHIPPED | OUTPATIENT
Start: 2022-01-01 | End: 2022-01-01 | Stop reason: SDUPTHER

## 2022-01-01 RX ORDER — LOVASTATIN 20 MG/1
20 TABLET ORAL NIGHTLY
Qty: 90 TABLET | Refills: 3 | Status: SHIPPED | OUTPATIENT
Start: 2022-01-01

## 2022-01-01 RX ORDER — DEXAMETHASONE SODIUM PHOSPHATE 10 MG/ML
INJECTION INTRAMUSCULAR; INTRAVENOUS AS NEEDED
Status: DISCONTINUED | OUTPATIENT
Start: 2022-01-01 | End: 2022-01-01 | Stop reason: SURG

## 2022-01-01 RX ORDER — GLYCOPYRROLATE 0.2 MG/ML
INJECTION INTRAMUSCULAR; INTRAVENOUS AS NEEDED
Status: DISCONTINUED | OUTPATIENT
Start: 2022-01-01 | End: 2022-01-01 | Stop reason: SURG

## 2022-01-01 RX ORDER — LEVOTHYROXINE SODIUM 0.1 MG/1
100 TABLET ORAL DAILY
Qty: 90 TABLET | Refills: 3 | Status: SHIPPED | OUTPATIENT
Start: 2022-01-01

## 2022-01-01 RX ORDER — NEOSTIGMINE METHYLSULFATE 1 MG/ML
INJECTION, SOLUTION INTRAVENOUS AS NEEDED
Status: DISCONTINUED | OUTPATIENT
Start: 2022-01-01 | End: 2022-01-01 | Stop reason: SURG

## 2022-01-01 RX ORDER — CYCLOBENZAPRINE HCL 10 MG
TABLET ORAL
Qty: 120 TABLET | Refills: 11 | OUTPATIENT
Start: 2022-01-01

## 2022-01-01 RX ORDER — MIDAZOLAM HYDROCHLORIDE 1 MG/ML
INJECTION INTRAMUSCULAR; INTRAVENOUS
Status: DISPENSED
Start: 2022-01-01 | End: 2022-01-01

## 2022-01-01 RX ORDER — LIDOCAINE HYDROCHLORIDE 20 MG/ML
INJECTION, SOLUTION INFILTRATION; PERINEURAL AS NEEDED
Status: DISCONTINUED | OUTPATIENT
Start: 2022-01-01 | End: 2022-01-01 | Stop reason: SURG

## 2022-01-01 RX ORDER — IPRATROPIUM BROMIDE AND ALBUTEROL SULFATE 2.5; .5 MG/3ML; MG/3ML
3 SOLUTION RESPIRATORY (INHALATION)
Status: DISCONTINUED | OUTPATIENT
Start: 2022-01-01 | End: 2022-01-01 | Stop reason: HOSPADM

## 2022-01-01 RX ORDER — MIDAZOLAM HYDROCHLORIDE 1 MG/ML
INJECTION INTRAMUSCULAR; INTRAVENOUS
Status: COMPLETED | OUTPATIENT
Start: 2022-01-01 | End: 2022-01-01

## 2022-01-01 RX ORDER — SODIUM CHLORIDE 9 MG/ML
9 INJECTION, SOLUTION INTRAVENOUS ONCE
Status: COMPLETED | OUTPATIENT
Start: 2022-01-01 | End: 2022-01-01

## 2022-01-01 RX ORDER — MORPHINE SULFATE 4 MG/ML
2 INJECTION, SOLUTION INTRAMUSCULAR; INTRAVENOUS
Status: CANCELLED | OUTPATIENT
Start: 2022-01-01

## 2022-01-01 RX ORDER — KETAMINE HCL IN NACL, ISO-OSM 100MG/10ML
SYRINGE (ML) INJECTION AS NEEDED
Status: DISCONTINUED | OUTPATIENT
Start: 2022-01-01 | End: 2022-01-01 | Stop reason: SURG

## 2022-01-01 RX ORDER — ASPIRIN 81 MG/1
81 TABLET ORAL DAILY
Status: DISCONTINUED | OUTPATIENT
Start: 2022-01-01 | End: 2022-01-01 | Stop reason: HOSPADM

## 2022-01-01 RX ORDER — BUMETANIDE 0.25 MG/ML
2 INJECTION INTRAMUSCULAR; INTRAVENOUS ONCE
Status: COMPLETED | OUTPATIENT
Start: 2022-01-01 | End: 2022-01-01

## 2022-01-01 RX ORDER — BUPIVACAINE HYDROCHLORIDE AND EPINEPHRINE 5; 5 MG/ML; UG/ML
INJECTION, SOLUTION PERINEURAL AS NEEDED
Status: DISCONTINUED | OUTPATIENT
Start: 2022-01-01 | End: 2022-01-01 | Stop reason: HOSPADM

## 2022-01-01 RX ORDER — SODIUM CHLORIDE 0.9 % (FLUSH) 0.9 %
3 SYRINGE (ML) INJECTION EVERY 12 HOURS SCHEDULED
Status: DISCONTINUED | OUTPATIENT
Start: 2022-01-01 | End: 2022-01-01 | Stop reason: HOSPADM

## 2022-01-01 RX ORDER — GABAPENTIN 100 MG/1
100 CAPSULE ORAL ONCE
Status: CANCELLED | OUTPATIENT
Start: 2022-01-01 | End: 2022-01-01

## 2022-01-01 RX ORDER — PANTOPRAZOLE SODIUM 40 MG/1
40 TABLET, DELAYED RELEASE ORAL EVERY MORNING
Refills: 3 | Status: DISCONTINUED | OUTPATIENT
Start: 2022-01-01 | End: 2022-01-01 | Stop reason: HOSPADM

## 2022-01-01 RX ORDER — ALBUTEROL SULFATE 90 UG/1
2 AEROSOL, METERED RESPIRATORY (INHALATION)
Status: DISCONTINUED | OUTPATIENT
Start: 2022-01-01 | End: 2022-01-01 | Stop reason: HOSPADM

## 2022-01-01 RX ORDER — ATORVASTATIN CALCIUM 10 MG/1
10 TABLET, FILM COATED ORAL NIGHTLY
Refills: 3 | Status: DISCONTINUED | OUTPATIENT
Start: 2022-01-01 | End: 2022-01-01 | Stop reason: HOSPADM

## 2022-01-01 RX ORDER — SODIUM CHLORIDE 0.9 % (FLUSH) 0.9 %
10 SYRINGE (ML) INJECTION AS NEEDED
Status: DISCONTINUED | OUTPATIENT
Start: 2022-01-01 | End: 2022-01-01 | Stop reason: HOSPADM

## 2022-01-01 RX ORDER — LEVOTHYROXINE SODIUM 0.1 MG/1
100 TABLET ORAL DAILY
Qty: 90 TABLET | Refills: 3 | Status: SHIPPED | OUTPATIENT
Start: 2022-01-01 | End: 2022-01-01

## 2022-01-01 RX ORDER — PROCHLORPERAZINE EDISYLATE 5 MG/ML
5 INJECTION INTRAMUSCULAR; INTRAVENOUS ONCE
Status: COMPLETED | OUTPATIENT
Start: 2022-01-01 | End: 2022-01-01

## 2022-01-01 RX ORDER — CHLORHEXIDINE GLUCONATE 500 MG/1
1 CLOTH TOPICAL EVERY 12 HOURS PRN
Status: DISCONTINUED | OUTPATIENT
Start: 2022-01-01 | End: 2022-01-01 | Stop reason: HOSPADM

## 2022-01-01 RX ORDER — DEXMEDETOMIDINE HYDROCHLORIDE 100 UG/ML
INJECTION, SOLUTION INTRAVENOUS AS NEEDED
Status: DISCONTINUED | OUTPATIENT
Start: 2022-01-01 | End: 2022-01-01 | Stop reason: SURG

## 2022-01-01 RX ORDER — LEVOTHYROXINE SODIUM 0.1 MG/1
TABLET ORAL
Qty: 90 TABLET | Refills: 0 | Status: SHIPPED | OUTPATIENT
Start: 2022-01-01 | End: 2022-01-01 | Stop reason: SDUPTHER

## 2022-01-01 RX ORDER — BISACODYL 10 MG
10 SUPPOSITORY, RECTAL RECTAL DAILY PRN
Status: DISCONTINUED | OUTPATIENT
Start: 2022-01-01 | End: 2022-01-01 | Stop reason: HOSPADM

## 2022-01-01 RX ORDER — SODIUM CHLORIDE 9 MG/ML
9 INJECTION, SOLUTION INTRAVENOUS ONCE
Status: CANCELLED | OUTPATIENT
Start: 2022-01-01

## 2022-01-01 RX ORDER — GABAPENTIN 100 MG/1
100 CAPSULE ORAL 3 TIMES DAILY
Status: DISCONTINUED | OUTPATIENT
Start: 2022-01-01 | End: 2022-01-01 | Stop reason: HOSPADM

## 2022-01-01 RX ORDER — POLYETHYLENE GLYCOL 3350 17 G/17G
17 POWDER, FOR SOLUTION ORAL DAILY
Status: DISCONTINUED | OUTPATIENT
Start: 2022-01-01 | End: 2022-01-01 | Stop reason: HOSPADM

## 2022-01-01 RX ORDER — SODIUM CHLORIDE 0.9 % (FLUSH) 0.9 %
10 SYRINGE (ML) INJECTION EVERY 8 HOURS SCHEDULED
Status: DISCONTINUED | OUTPATIENT
Start: 2022-01-01 | End: 2022-01-01 | Stop reason: HOSPADM

## 2022-01-01 RX ORDER — SODIUM CHLORIDE 9 MG/ML
30 INJECTION, SOLUTION INTRAVENOUS CONTINUOUS
Status: DISCONTINUED | OUTPATIENT
Start: 2022-01-01 | End: 2022-01-01

## 2022-01-01 RX ORDER — GABAPENTIN 100 MG/1
100 CAPSULE ORAL ONCE
Status: COMPLETED | OUTPATIENT
Start: 2022-01-01 | End: 2022-01-01

## 2022-01-01 RX ORDER — DOCUSATE SODIUM 100 MG/1
200 CAPSULE, LIQUID FILLED ORAL 2 TIMES DAILY
COMMUNITY

## 2022-01-01 RX ORDER — CEFAZOLIN SODIUM 2 G/100ML
2 INJECTION, SOLUTION INTRAVENOUS ONCE
Status: COMPLETED | OUTPATIENT
Start: 2022-01-01 | End: 2022-01-01

## 2022-01-01 RX ORDER — TIZANIDINE 4 MG/1
4 TABLET ORAL EVERY 6 HOURS PRN
Qty: 120 TABLET | Refills: 11 | Status: SHIPPED | OUTPATIENT
Start: 2022-01-01

## 2022-01-01 RX ORDER — LOVASTATIN 20 MG/1
20 TABLET ORAL NIGHTLY
Qty: 90 TABLET | Refills: 3 | Status: SHIPPED | OUTPATIENT
Start: 2022-01-01 | End: 2022-01-01 | Stop reason: SDUPTHER

## 2022-01-01 RX ORDER — OXYCODONE HYDROCHLORIDE 5 MG/1
10 TABLET ORAL 2 TIMES DAILY
Status: DISCONTINUED | OUTPATIENT
Start: 2022-01-01 | End: 2022-01-01 | Stop reason: HOSPADM

## 2022-01-01 RX ORDER — SODIUM CHLORIDE, SODIUM LACTATE, POTASSIUM CHLORIDE, CALCIUM CHLORIDE 600; 310; 30; 20 MG/100ML; MG/100ML; MG/100ML; MG/100ML
INJECTION, SOLUTION INTRAVENOUS CONTINUOUS PRN
Status: DISCONTINUED | OUTPATIENT
Start: 2022-01-01 | End: 2022-01-01 | Stop reason: SURG

## 2022-01-01 RX ORDER — FAMOTIDINE 20 MG/1
20 TABLET, FILM COATED ORAL ONCE
Status: COMPLETED | OUTPATIENT
Start: 2022-01-01 | End: 2022-01-01

## 2022-01-01 RX ORDER — LIDOCAINE 50 MG/G
1 PATCH TOPICAL EVERY 24 HOURS
Qty: 30 PATCH | Refills: 11 | Status: SHIPPED | OUTPATIENT
Start: 2022-01-01 | End: 2022-01-01 | Stop reason: SDUPTHER

## 2022-01-01 RX ORDER — DOCUSATE SODIUM 100 MG/1
100 CAPSULE, LIQUID FILLED ORAL 2 TIMES DAILY PRN
Status: DISCONTINUED | OUTPATIENT
Start: 2022-01-01 | End: 2022-01-01 | Stop reason: HOSPADM

## 2022-01-01 RX ORDER — BUPRENORPHINE HYDROCHLORIDE 0.32 MG/ML
INJECTION INTRAMUSCULAR; INTRAVENOUS AS NEEDED
Status: DISCONTINUED | OUTPATIENT
Start: 2022-01-01 | End: 2022-01-01 | Stop reason: SURG

## 2022-01-01 RX ORDER — HYDROCODONE BITARTRATE AND ACETAMINOPHEN 5; 325 MG/1; MG/1
1 TABLET ORAL EVERY 4 HOURS PRN
Status: CANCELLED | OUTPATIENT
Start: 2022-01-01 | End: 2022-01-01

## 2022-01-01 RX ORDER — MEPERIDINE HYDROCHLORIDE 25 MG/ML
12.5 INJECTION INTRAMUSCULAR; INTRAVENOUS; SUBCUTANEOUS
Status: DISCONTINUED | OUTPATIENT
Start: 2022-01-01 | End: 2022-01-01 | Stop reason: HOSPADM

## 2022-01-01 RX ORDER — MULTIVITAMIN WITH IRON
1 TABLET ORAL DAILY
Qty: 90 TABLET | Refills: 3 | Status: SHIPPED | OUTPATIENT
Start: 2022-01-01 | End: 2022-01-01

## 2022-01-01 RX ORDER — PROPOFOL 10 MG/ML
VIAL (ML) INTRAVENOUS AS NEEDED
Status: DISCONTINUED | OUTPATIENT
Start: 2022-01-01 | End: 2022-01-01 | Stop reason: SURG

## 2022-01-01 RX ORDER — HYDROMORPHONE HYDROCHLORIDE 1 MG/ML
0.5 INJECTION, SOLUTION INTRAMUSCULAR; INTRAVENOUS; SUBCUTANEOUS
Status: DISCONTINUED | OUTPATIENT
Start: 2022-01-01 | End: 2022-01-01 | Stop reason: HOSPADM

## 2022-01-01 RX ORDER — LIDOCAINE HYDROCHLORIDE 10 MG/ML
0.5 INJECTION, SOLUTION EPIDURAL; INFILTRATION; INTRACAUDAL; PERINEURAL ONCE AS NEEDED
Status: COMPLETED | OUTPATIENT
Start: 2022-01-01 | End: 2022-01-01

## 2022-01-01 RX ORDER — KETOROLAC TROMETHAMINE 30 MG/ML
30 INJECTION, SOLUTION INTRAMUSCULAR; INTRAVENOUS ONCE
Status: COMPLETED | OUTPATIENT
Start: 2022-01-01 | End: 2022-01-01

## 2022-01-01 RX ORDER — FENTANYL CITRATE 50 UG/ML
50 INJECTION, SOLUTION INTRAMUSCULAR; INTRAVENOUS
Status: DISCONTINUED | OUTPATIENT
Start: 2022-01-01 | End: 2022-01-01 | Stop reason: HOSPADM

## 2022-01-01 RX ORDER — OMEPRAZOLE 40 MG/1
40 CAPSULE, DELAYED RELEASE ORAL DAILY
Qty: 90 CAPSULE | Refills: 3 | Status: SHIPPED | OUTPATIENT
Start: 2022-01-01

## 2022-01-01 RX ORDER — ALLOPURINOL 100 MG/1
100 TABLET ORAL DAILY
Qty: 90 TABLET | Refills: 3 | Status: SHIPPED | OUTPATIENT
Start: 2022-01-01

## 2022-01-01 RX ORDER — LEVOTHYROXINE SODIUM 0.1 MG/1
TABLET ORAL
Qty: 90 TABLET | Refills: 3 | Status: SHIPPED | OUTPATIENT
Start: 2022-01-01 | End: 2022-01-01

## 2022-01-01 RX ORDER — FENTANYL CITRATE 50 UG/ML
INJECTION, SOLUTION INTRAMUSCULAR; INTRAVENOUS
Status: COMPLETED
Start: 2022-01-01 | End: 2022-01-01

## 2022-01-01 RX ORDER — CEFAZOLIN SODIUM 2 G/100ML
2 INJECTION, SOLUTION INTRAVENOUS EVERY 8 HOURS
Status: COMPLETED | OUTPATIENT
Start: 2022-01-01 | End: 2022-01-01

## 2022-01-01 RX ORDER — AMOXICILLIN 250 MG
2 CAPSULE ORAL 2 TIMES DAILY PRN
Status: DISCONTINUED | OUTPATIENT
Start: 2022-01-01 | End: 2022-01-01 | Stop reason: HOSPADM

## 2022-01-01 RX ORDER — ALLOPURINOL 100 MG/1
100 TABLET ORAL DAILY
Status: DISCONTINUED | OUTPATIENT
Start: 2022-01-01 | End: 2022-01-01 | Stop reason: HOSPADM

## 2022-01-01 RX ORDER — MORPHINE SULFATE 1 MG/ML
2 INJECTION, SOLUTION EPIDURAL; INTRATHECAL; INTRAVENOUS EVERY 4 HOURS PRN
Status: DISCONTINUED | OUTPATIENT
Start: 2022-01-01 | End: 2022-01-01 | Stop reason: CLARIF

## 2022-01-01 RX ADMIN — FENTANYL CITRATE 50 MCG: 50 INJECTION, SOLUTION INTRAMUSCULAR; INTRAVENOUS at 11:27

## 2022-01-01 RX ADMIN — MUPIROCIN 1 APPLICATION: 20 OINTMENT TOPICAL at 12:58

## 2022-01-01 RX ADMIN — PANTOPRAZOLE SODIUM 40 MG: 40 TABLET, DELAYED RELEASE ORAL at 08:26

## 2022-01-01 RX ADMIN — BUPRENORPHINE HYDROCHLORIDE 0.15 MCG: 0.32 INJECTION INTRAMUSCULAR; INTRAVENOUS at 16:59

## 2022-01-01 RX ADMIN — SODIUM CHLORIDE, POTASSIUM CHLORIDE, SODIUM LACTATE AND CALCIUM CHLORIDE: 600; 310; 30; 20 INJECTION, SOLUTION INTRAVENOUS at 15:01

## 2022-01-01 RX ADMIN — IPRATROPIUM BROMIDE AND ALBUTEROL SULFATE 3 ML: .5; 3 SOLUTION RESPIRATORY (INHALATION) at 07:42

## 2022-01-01 RX ADMIN — PANTOPRAZOLE SODIUM 40 MG: 40 TABLET, DELAYED RELEASE ORAL at 08:43

## 2022-01-01 RX ADMIN — NICARDIPINE HYDROCHLORIDE 2.5 MG/HR: 0.1 INJECTION, SOLUTION INTRAVENOUS at 01:35

## 2022-01-01 RX ADMIN — CEFAZOLIN SODIUM 2 G: 2 INJECTION, SOLUTION INTRAVENOUS at 15:11

## 2022-01-01 RX ADMIN — MAGNESIUM SULFATE HEPTAHYDRATE 2 G: 40 INJECTION, SOLUTION INTRAVENOUS at 04:27

## 2022-01-01 RX ADMIN — LEVOTHYROXINE SODIUM 100 MCG: 0.1 TABLET ORAL at 08:26

## 2022-01-01 RX ADMIN — ATORVASTATIN CALCIUM 10 MG: 10 TABLET, FILM COATED ORAL at 21:19

## 2022-01-01 RX ADMIN — Medication 50 MG: at 16:36

## 2022-01-01 RX ADMIN — MORPHINE SULFATE 30 MG: 30 TABLET, FILM COATED, EXTENDED RELEASE ORAL at 08:06

## 2022-01-01 RX ADMIN — GABAPENTIN 100 MG: 100 CAPSULE ORAL at 08:06

## 2022-01-01 RX ADMIN — BUMETANIDE 2 MG: 0.25 INJECTION INTRAMUSCULAR; INTRAVENOUS at 09:19

## 2022-01-01 RX ADMIN — Medication 10 ML: at 20:39

## 2022-01-01 RX ADMIN — FENTANYL CITRATE 25 MCG: 50 INJECTION, SOLUTION INTRAMUSCULAR; INTRAVENOUS at 11:10

## 2022-01-01 RX ADMIN — HYDROMORPHONE HYDROCHLORIDE 0.5 MG: 1 INJECTION, SOLUTION INTRAMUSCULAR; INTRAVENOUS; SUBCUTANEOUS at 19:40

## 2022-01-01 RX ADMIN — ASPIRIN 81 MG: 81 TABLET, COATED ORAL at 08:07

## 2022-01-01 RX ADMIN — GABAPENTIN 100 MG: 100 CAPSULE ORAL at 08:19

## 2022-01-01 RX ADMIN — LEVOTHYROXINE SODIUM 100 MCG: 0.1 TABLET ORAL at 08:19

## 2022-01-01 RX ADMIN — PROCHLORPERAZINE EDISYLATE 5 MG: 5 INJECTION INTRAMUSCULAR; INTRAVENOUS at 05:46

## 2022-01-01 RX ADMIN — ACETAMINOPHEN 1000 MG: 500 TABLET, FILM COATED ORAL at 21:19

## 2022-01-01 RX ADMIN — Medication 10 ML: at 05:33

## 2022-01-01 RX ADMIN — MORPHINE SULFATE 2 MG: 2 INJECTION, SOLUTION INTRAMUSCULAR; INTRAVENOUS at 03:41

## 2022-01-01 RX ADMIN — SENNOSIDES AND DOCUSATE SODIUM 2 TABLET: 50; 8.6 TABLET ORAL at 21:59

## 2022-01-01 RX ADMIN — ROCURONIUM BROMIDE 30 MG: 10 INJECTION INTRAVENOUS at 17:10

## 2022-01-01 RX ADMIN — ACETAMINOPHEN 1000 MG: 500 TABLET, FILM COATED ORAL at 06:58

## 2022-01-01 RX ADMIN — FENTANYL CITRATE 50 MCG: 50 INJECTION, SOLUTION INTRAMUSCULAR; INTRAVENOUS at 20:06

## 2022-01-01 RX ADMIN — GABAPENTIN 100 MG: 100 CAPSULE ORAL at 20:38

## 2022-01-01 RX ADMIN — ALLOPURINOL 100 MG: 100 TABLET ORAL at 08:06

## 2022-01-01 RX ADMIN — ACETAMINOPHEN 1000 MG: 500 TABLET, FILM COATED ORAL at 05:16

## 2022-01-01 RX ADMIN — OXYCODONE 10 MG: 5 TABLET ORAL at 21:18

## 2022-01-01 RX ADMIN — MEPERIDINE HYDROCHLORIDE 12.5 MG: 25 INJECTION INTRAMUSCULAR; INTRAVENOUS; SUBCUTANEOUS at 20:00

## 2022-01-01 RX ADMIN — GABAPENTIN 100 MG: 100 CAPSULE ORAL at 12:57

## 2022-01-01 RX ADMIN — ASPIRIN 81 MG: 81 TABLET, COATED ORAL at 08:26

## 2022-01-01 RX ADMIN — ACETAMINOPHEN 1000 MG: 500 TABLET, FILM COATED ORAL at 13:36

## 2022-01-01 RX ADMIN — PROPOFOL 200 MG: 10 INJECTION, EMULSION INTRAVENOUS at 15:01

## 2022-01-01 RX ADMIN — IPRATROPIUM BROMIDE AND ALBUTEROL SULFATE 3 ML: .5; 3 SOLUTION RESPIRATORY (INHALATION) at 20:13

## 2022-01-01 RX ADMIN — LEVOTHYROXINE SODIUM 100 MCG: 0.1 TABLET ORAL at 08:43

## 2022-01-01 RX ADMIN — IPRATROPIUM BROMIDE AND ALBUTEROL SULFATE 3 ML: .5; 3 SOLUTION RESPIRATORY (INHALATION) at 07:24

## 2022-01-01 RX ADMIN — IPRATROPIUM BROMIDE AND ALBUTEROL SULFATE 3 ML: .5; 3 SOLUTION RESPIRATORY (INHALATION) at 13:24

## 2022-01-01 RX ADMIN — OXYCODONE 10 MG: 5 TABLET ORAL at 08:18

## 2022-01-01 RX ADMIN — HYDROMORPHONE HYDROCHLORIDE 0.5 MG: 1 INJECTION, SOLUTION INTRAMUSCULAR; INTRAVENOUS; SUBCUTANEOUS at 19:45

## 2022-01-01 RX ADMIN — ACETAMINOPHEN 1000 MG: 500 TABLET, FILM COATED ORAL at 14:00

## 2022-01-01 RX ADMIN — ASPIRIN 81 MG: 81 TABLET, COATED ORAL at 08:43

## 2022-01-01 RX ADMIN — DOCUSATE SODIUM 200 MG: 100 CAPSULE, LIQUID FILLED ORAL at 21:18

## 2022-01-01 RX ADMIN — IPRATROPIUM BROMIDE AND ALBUTEROL SULFATE 3 ML: .5; 3 SOLUTION RESPIRATORY (INHALATION) at 15:38

## 2022-01-01 RX ADMIN — SENNOSIDES AND DOCUSATE SODIUM 2 TABLET: 50; 8.6 TABLET ORAL at 21:19

## 2022-01-01 RX ADMIN — ACETAMINOPHEN 1000 MG: 500 TABLET, FILM COATED ORAL at 20:38

## 2022-01-01 RX ADMIN — MORPHINE SULFATE 30 MG: 30 TABLET, FILM COATED, EXTENDED RELEASE ORAL at 08:19

## 2022-01-01 RX ADMIN — DOCUSATE SODIUM 200 MG: 100 CAPSULE, LIQUID FILLED ORAL at 21:59

## 2022-01-01 RX ADMIN — GABAPENTIN 100 MG: 100 CAPSULE ORAL at 16:12

## 2022-01-01 RX ADMIN — Medication 10 ML: at 08:26

## 2022-01-01 RX ADMIN — ACETAMINOPHEN 1000 MG: 500 TABLET, FILM COATED ORAL at 06:32

## 2022-01-01 RX ADMIN — HEPARIN SODIUM 5000 UNITS: 5000 INJECTION INTRAVENOUS; SUBCUTANEOUS at 08:25

## 2022-01-01 RX ADMIN — GABAPENTIN 100 MG: 100 CAPSULE ORAL at 20:56

## 2022-01-01 RX ADMIN — DOCUSATE SODIUM 200 MG: 100 CAPSULE, LIQUID FILLED ORAL at 20:36

## 2022-01-01 RX ADMIN — SODIUM CHLORIDE, POTASSIUM CHLORIDE, SODIUM LACTATE AND CALCIUM CHLORIDE: 600; 310; 30; 20 INJECTION, SOLUTION INTRAVENOUS at 17:30

## 2022-01-01 RX ADMIN — POLYETHYLENE GLYCOL 3350 17 G: 17 POWDER, FOR SOLUTION ORAL at 08:18

## 2022-01-01 RX ADMIN — OXYCODONE 10 MG: 5 TABLET ORAL at 20:38

## 2022-01-01 RX ADMIN — HEPARIN SODIUM 5000 UNITS: 5000 INJECTION INTRAVENOUS; SUBCUTANEOUS at 08:44

## 2022-01-01 RX ADMIN — FENTANYL CITRATE 100 MCG: 50 INJECTION, SOLUTION INTRAMUSCULAR; INTRAVENOUS at 15:18

## 2022-01-01 RX ADMIN — ROCURONIUM BROMIDE 20 MG: 10 INJECTION INTRAVENOUS at 16:16

## 2022-01-01 RX ADMIN — ONDANSETRON 4 MG: 2 INJECTION INTRAMUSCULAR; INTRAVENOUS at 01:42

## 2022-01-01 RX ADMIN — HEPARIN SODIUM 5000 UNITS: 5000 INJECTION INTRAVENOUS; SUBCUTANEOUS at 20:37

## 2022-01-01 RX ADMIN — ATORVASTATIN CALCIUM 10 MG: 10 TABLET, FILM COATED ORAL at 21:59

## 2022-01-01 RX ADMIN — Medication 10 ML: at 05:16

## 2022-01-01 RX ADMIN — HEPARIN SODIUM 5000 UNITS: 5000 INJECTION INTRAVENOUS; SUBCUTANEOUS at 20:55

## 2022-01-01 RX ADMIN — MORPHINE SULFATE 30 MG: 30 TABLET, FILM COATED, EXTENDED RELEASE ORAL at 20:37

## 2022-01-01 RX ADMIN — ROCURONIUM BROMIDE 50 MG: 10 INJECTION INTRAVENOUS at 15:01

## 2022-01-01 RX ADMIN — MORPHINE SULFATE 2 MG: 2 INJECTION, SOLUTION INTRAMUSCULAR; INTRAVENOUS at 11:51

## 2022-01-01 RX ADMIN — TAMSULOSIN HYDROCHLORIDE 0.4 MG: 0.4 CAPSULE ORAL at 08:26

## 2022-01-01 RX ADMIN — ALLOPURINOL 100 MG: 100 TABLET ORAL at 08:43

## 2022-01-01 RX ADMIN — Medication 10 ML: at 22:01

## 2022-01-01 RX ADMIN — MIDAZOLAM HYDROCHLORIDE 0.5 MG: 1 INJECTION, SOLUTION INTRAMUSCULAR; INTRAVENOUS at 11:10

## 2022-01-01 RX ADMIN — OXYCODONE 10 MG: 5 TABLET ORAL at 08:43

## 2022-01-01 RX ADMIN — GLYCOPYRROLATE 0.4 MG: 0.2 INJECTION INTRAMUSCULAR; INTRAVENOUS at 18:55

## 2022-01-01 RX ADMIN — ACETAMINOPHEN 1000 MG: 500 TABLET, FILM COATED ORAL at 21:59

## 2022-01-01 RX ADMIN — MORPHINE SULFATE 30 MG: 30 TABLET, FILM COATED, EXTENDED RELEASE ORAL at 22:03

## 2022-01-01 RX ADMIN — DOCUSATE SODIUM 200 MG: 100 CAPSULE, LIQUID FILLED ORAL at 08:18

## 2022-01-01 RX ADMIN — GABAPENTIN 100 MG: 100 CAPSULE ORAL at 22:00

## 2022-01-01 RX ADMIN — Medication 10 ML: at 08:44

## 2022-01-01 RX ADMIN — LIDOCAINE HYDROCHLORIDE 50 MG: 20 INJECTION, SOLUTION INFILTRATION; PERINEURAL at 15:01

## 2022-01-01 RX ADMIN — ALBUTEROL SULFATE 4 PUFF: 90 AEROSOL, METERED RESPIRATORY (INHALATION) at 14:05

## 2022-01-01 RX ADMIN — GABAPENTIN 100 MG: 100 CAPSULE ORAL at 08:43

## 2022-01-01 RX ADMIN — FUROSEMIDE 40 MG: 10 INJECTION, SOLUTION INTRAMUSCULAR; INTRAVENOUS at 12:14

## 2022-01-01 RX ADMIN — SODIUM CHLORIDE 30 ML/HR: 9 INJECTION, SOLUTION INTRAVENOUS at 21:19

## 2022-01-01 RX ADMIN — Medication 10 ML: at 06:32

## 2022-01-01 RX ADMIN — PANTOPRAZOLE SODIUM 40 MG: 40 TABLET, DELAYED RELEASE ORAL at 06:58

## 2022-01-01 RX ADMIN — LIDOCAINE HYDROCHLORIDE 10 ML: 10 INJECTION, SOLUTION INFILTRATION; PERINEURAL at 11:42

## 2022-01-01 RX ADMIN — IPRATROPIUM BROMIDE AND ALBUTEROL SULFATE 3 ML: .5; 3 SOLUTION RESPIRATORY (INHALATION) at 19:49

## 2022-01-01 RX ADMIN — LIDOCAINE HYDROCHLORIDE 0.5 ML: 10 INJECTION, SOLUTION EPIDURAL; INFILTRATION; INTRACAUDAL; PERINEURAL at 12:57

## 2022-01-01 RX ADMIN — OXYCODONE 10 MG: 5 TABLET ORAL at 08:06

## 2022-01-01 RX ADMIN — IPRATROPIUM BROMIDE AND ALBUTEROL SULFATE 3 ML: .5; 3 SOLUTION RESPIRATORY (INHALATION) at 07:09

## 2022-01-01 RX ADMIN — FAMOTIDINE 20 MG: 20 TABLET ORAL at 12:57

## 2022-01-01 RX ADMIN — GABAPENTIN 100 MG: 100 CAPSULE ORAL at 16:22

## 2022-01-01 RX ADMIN — MIDAZOLAM HYDROCHLORIDE 0.5 MG: 1 INJECTION, SOLUTION INTRAMUSCULAR; INTRAVENOUS at 11:20

## 2022-01-01 RX ADMIN — ALLOPURINOL 100 MG: 100 TABLET ORAL at 08:26

## 2022-01-01 RX ADMIN — ACETAMINOPHEN 1000 MG: 500 TABLET, FILM COATED ORAL at 05:33

## 2022-01-01 RX ADMIN — MORPHINE SULFATE 30 MG: 30 TABLET, FILM COATED, EXTENDED RELEASE ORAL at 08:43

## 2022-01-01 RX ADMIN — PANTOPRAZOLE SODIUM 40 MG: 40 TABLET, DELAYED RELEASE ORAL at 08:19

## 2022-01-01 RX ADMIN — MORPHINE SULFATE 2 MG: 2 INJECTION, SOLUTION INTRAMUSCULAR; INTRAVENOUS at 14:05

## 2022-01-01 RX ADMIN — ALLOPURINOL 100 MG: 100 TABLET ORAL at 08:19

## 2022-01-01 RX ADMIN — GABAPENTIN 100 MG: 100 CAPSULE ORAL at 21:18

## 2022-01-01 RX ADMIN — ACETAMINOPHEN 1000 MG: 500 TABLET ORAL at 12:57

## 2022-01-01 RX ADMIN — OXYCODONE 10 MG: 5 TABLET ORAL at 08:26

## 2022-01-01 RX ADMIN — ACETAMINOPHEN 1000 MG: 500 TABLET, FILM COATED ORAL at 20:56

## 2022-01-01 RX ADMIN — OXYCODONE 10 MG: 5 TABLET ORAL at 21:58

## 2022-01-01 RX ADMIN — ASPIRIN 81 MG: 81 TABLET, COATED ORAL at 08:18

## 2022-01-01 RX ADMIN — KETOROLAC TROMETHAMINE 30 MG: 30 INJECTION, SOLUTION INTRAMUSCULAR; INTRAVENOUS at 12:57

## 2022-01-01 RX ADMIN — LEVOTHYROXINE SODIUM 100 MCG: 0.1 TABLET ORAL at 08:06

## 2022-01-01 RX ADMIN — Medication 10 ML: at 13:36

## 2022-01-01 RX ADMIN — CEFAZOLIN SODIUM 2 G: 2 INJECTION, SOLUTION INTRAVENOUS at 06:58

## 2022-01-01 RX ADMIN — HYDROMORPHONE HYDROCHLORIDE 0.5 MG: 1 INJECTION, SOLUTION INTRAMUSCULAR; INTRAVENOUS; SUBCUTANEOUS at 20:37

## 2022-01-01 RX ADMIN — TAMSULOSIN HYDROCHLORIDE 0.4 MG: 0.4 CAPSULE ORAL at 08:19

## 2022-01-01 RX ADMIN — HEPARIN SODIUM 5000 UNITS: 5000 INJECTION INTRAVENOUS; SUBCUTANEOUS at 21:58

## 2022-01-01 RX ADMIN — ACETAMINOPHEN 1000 MG: 500 TABLET, FILM COATED ORAL at 14:11

## 2022-01-01 RX ADMIN — MORPHINE SULFATE 2 MG: 2 INJECTION, SOLUTION INTRAMUSCULAR; INTRAVENOUS at 09:27

## 2022-01-01 RX ADMIN — DEXMEDETOMIDINE HYDROCHLORIDE 8 MCG: 100 INJECTION, SOLUTION INTRAVENOUS at 15:01

## 2022-01-01 RX ADMIN — MORPHINE SULFATE 2 MG: 2 INJECTION, SOLUTION INTRAMUSCULAR; INTRAVENOUS at 15:53

## 2022-01-01 RX ADMIN — IPRATROPIUM BROMIDE AND ALBUTEROL SULFATE 3 ML: .5; 3 SOLUTION RESPIRATORY (INHALATION) at 16:22

## 2022-01-01 RX ADMIN — HEPARIN SODIUM 5000 UNITS: 5000 INJECTION INTRAVENOUS; SUBCUTANEOUS at 08:07

## 2022-01-01 RX ADMIN — FENTANYL CITRATE 50 MCG: 50 INJECTION, SOLUTION INTRAMUSCULAR; INTRAVENOUS at 20:10

## 2022-01-01 RX ADMIN — ONDANSETRON 4 MG: 2 INJECTION INTRAMUSCULAR; INTRAVENOUS at 17:58

## 2022-01-01 RX ADMIN — IPRATROPIUM BROMIDE AND ALBUTEROL SULFATE 3 ML: .5; 3 SOLUTION RESPIRATORY (INHALATION) at 07:14

## 2022-01-01 RX ADMIN — NICARDIPINE HYDROCHLORIDE 5 MG/HR: 25 INJECTION, SOLUTION INTRAVENOUS at 05:16

## 2022-01-01 RX ADMIN — IPRATROPIUM BROMIDE AND ALBUTEROL SULFATE 3 ML: .5; 3 SOLUTION RESPIRATORY (INHALATION) at 21:50

## 2022-01-01 RX ADMIN — OXYCODONE 10 MG: 5 TABLET ORAL at 20:56

## 2022-01-01 RX ADMIN — DEXAMETHASONE SODIUM PHOSPHATE 8 MG: 10 INJECTION INTRAMUSCULAR; INTRAVENOUS at 15:12

## 2022-01-01 RX ADMIN — MORPHINE SULFATE 30 MG: 30 TABLET, FILM COATED, EXTENDED RELEASE ORAL at 00:27

## 2022-01-01 RX ADMIN — HEPARIN SODIUM 5000 UNITS: 5000 INJECTION INTRAVENOUS; SUBCUTANEOUS at 08:19

## 2022-01-01 RX ADMIN — FENTANYL CITRATE 25 MCG: 50 INJECTION, SOLUTION INTRAMUSCULAR; INTRAVENOUS at 11:20

## 2022-01-01 RX ADMIN — GABAPENTIN 100 MG: 100 CAPSULE ORAL at 08:26

## 2022-01-01 RX ADMIN — MORPHINE SULFATE 2 MG: 2 INJECTION, SOLUTION INTRAMUSCULAR; INTRAVENOUS at 08:51

## 2022-01-01 RX ADMIN — TAMSULOSIN HYDROCHLORIDE 0.4 MG: 0.4 CAPSULE ORAL at 08:43

## 2022-01-01 RX ADMIN — NEOSTIGMINE METHYLSULFATE 3 MG: 0.5 INJECTION INTRAVENOUS at 18:55

## 2022-01-01 RX ADMIN — TAMSULOSIN HYDROCHLORIDE 0.4 MG: 0.4 CAPSULE ORAL at 08:06

## 2022-01-01 RX ADMIN — FLUDEOXYGLUCOSE F18 1 DOSE: 300 INJECTION INTRAVENOUS at 14:24

## 2022-01-01 RX ADMIN — DOCUSATE SODIUM 200 MG: 100 CAPSULE, LIQUID FILLED ORAL at 08:43

## 2022-01-01 RX ADMIN — SENNOSIDES AND DOCUSATE SODIUM 2 TABLET: 50; 8.6 TABLET ORAL at 20:37

## 2022-01-01 RX ADMIN — Medication 10 ML: at 08:18

## 2022-01-01 RX ADMIN — IPRATROPIUM BROMIDE AND ALBUTEROL SULFATE 3 ML: .5; 3 SOLUTION RESPIRATORY (INHALATION) at 15:37

## 2022-01-01 RX ADMIN — DOCUSATE SODIUM 200 MG: 100 CAPSULE, LIQUID FILLED ORAL at 08:26

## 2022-01-01 RX ADMIN — SODIUM CHLORIDE 9 ML/HR: 9 INJECTION, SOLUTION INTRAVENOUS at 12:58

## 2022-01-01 RX ADMIN — ATORVASTATIN CALCIUM 10 MG: 10 TABLET, FILM COATED ORAL at 20:55

## 2022-01-01 RX ADMIN — GABAPENTIN 100 MG: 100 CAPSULE ORAL at 15:37

## 2022-01-01 RX ADMIN — ATORVASTATIN CALCIUM 10 MG: 10 TABLET, FILM COATED ORAL at 20:36

## 2022-01-01 RX ADMIN — IPRATROPIUM BROMIDE AND ALBUTEROL SULFATE 3 ML: .5; 3 SOLUTION RESPIRATORY (INHALATION) at 12:16

## 2022-01-01 RX ADMIN — MORPHINE SULFATE 30 MG: 30 TABLET, FILM COATED, EXTENDED RELEASE ORAL at 08:26

## 2022-01-01 RX ADMIN — CEFAZOLIN SODIUM 2 G: 2 INJECTION, SOLUTION INTRAVENOUS at 23:22

## 2022-01-01 RX ADMIN — BUPRENORPHINE HYDROCHLORIDE 0.15 MCG: 0.32 INJECTION INTRAMUSCULAR; INTRAVENOUS at 17:08

## 2022-01-17 NOTE — PROGRESS NOTES
Subjective:     Encounter Date:01/18/2022      Patient ID: Terry Mcguire is a 61 y.o.  white male, retired/disabled , and resident of [place], [place].     INTERNIST:  Segundo Vo MD  [facility] CARDIOLOGIST:  Segundo Duran MD, [facility]  NEUROLOGIST:  Shukri Taylor MD  PAIN SPECIALIST:  [facility] .    Chief Complaint:   Chief Complaint   Patient presents with   • Benign hypertension     Problem List:  1. Symptomatic bradycardia:  a. Status post [employer] pacemaker implantation in 2006.  b. Subsequent [employer] Accent DR/RF 2210 pacemaker implant, 05/22/2013  c. Abnormal interrogation with intermittent atrial tachycardias and nonsustained ventricular tachycardia, March 2018, with normal nuclear stress test  d. Abnormal acceptable pacemaker interrogation, April 2019, June 2021, January 2022  2. Benign hypertension.  3. Hypercholesterolemia.  4.  Chest pain syndrome:  a. Abnormal Cardiolite, 09/26/2006, with inferior  wall defect.  b. Normal cardiac catheterization, 10/06/2006; normal EF.  c. Acceptable negative IV Lexiscan Cardiolite stress study suggestive of low probability for significant focal obstructive coronary artery disease with preserved systolic left ventricular function (LVEF 0.77), March 2018.  d. Residual class I symptoms, June 2021, January 2022  5. Ongoing tobacco abuse with chronic obstructive pulmonary disease (1.5 PPD), 1 pack/day in June 2021, January 2022  6. Obstructive sleep apnea, with CPAP.  7. Seasonal allergies/rhinitis.  8. Osteoarthritis with chronic pain issues.  9. Gastroesophageal reflux disease with hiatal hernia.  10. Hypothyroidism  on chronic supplementation.  11. Essential tremors.  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  13. Mild obesity, BMI 30.67  14. Family history of hypertension.  15. Surgical history:  a. Pacemaker implantation.  b. Left ankle surgery.  c. Multiple  "colonoscopies.  d. Herniorrhaphy.  e. Bilateral elbow surgery.  f. Bilateral carpal tunnel release.     No Known Allergies    Current Outpatient Medications   Medication Instructions   • aspirin 81 MG tablet Oral, Daily   • cyclobenzaprine (FLEXERIL) 10 mg, Oral, 4 Times Daily PRN   • levocetirizine (XYZAL) 5 mg, Oral, Daily   • levothyroxine (SYNTHROID, LEVOTHROID) 100 MCG tablet TAKE 1 TABLET DAILY. PATIENT NEEDS FOLLOW UP APPT FOR FURTHER REFILLS.   • lovastatin (MEVACOR) 20 MG tablet TAKE 1 TABLET EVERY NIGHT   • Morphine (MS CONTIN) 30 MG 12 hr tablet take 1 tablet by mouth two times a day   • naproxen (NAPROSYN) 375 MG tablet TAKE 1 TABLET BY MOUTH TWICE DAILY AS NEEDED FOR ARTHRITIS PAIN   • omeprazole (PRILOSEC) 40 mg, Oral, Daily   • oxyCODONE (ROXICODONE) 10 MG tablet Oral, 2 Times Daily   • tamsulosin (FLOMAX) 0.4 mg, Oral, Daily         HISTORY OF PRESENT ILLNESS:  Patient is here for 7-month follow-up. His brother is dying and he may have to go to [place] soon. His brother has respiratory and liver failure. He is still smoking 1ppd and his wife smokes too. He still has leg spasms and gait disturbances. He has not had any hospitalizations. Patient otherwise denies chest pain, shortness of breath, PND, edema, palpitations, syncope or presyncope at this time.      Review of Systems   All other systems reviewed and are negative.     All other systems reviewed and otherwise negative.    Procedures       Objective:       Vitals:    01/18/22 1527 01/18/22 1528   BP: 146/80 122/66   BP Location: Left arm Left arm   Patient Position: Sitting Standing   Pulse: 98 92   SpO2: 95%    Weight: 82.5 kg (181 lb 12.8 oz)    Height: 167.6 cm (66\")      Body mass index is 29.34 kg/m².  Last weight June 2021 was 186 pounds  Constitutional:       Appearance: Healthy appearance. Not in distress.   Neck:      Vascular: No JVR. JVD normal.   Pulmonary:      Effort: Pulmonary effort is normal.      Breath sounds: Decreased " breath sounds present. No wheezing. No rhonchi. No rales.   Chest:      Chest wall: Not tender to palpatation.   Cardiovascular:      PMI at left midclavicular line. Normal rate. Regular rhythm. Normal S1. Normal S2.      Murmurs: There is a grade 1/6 systolic murmur at the URSB, radiating to the neck.      No gallop. No click. No rub.   Pulses:     Dorsalis pedis: 1+ bilaterally.     Posterior tibial: 1+ bilaterally.  Edema:     Peripheral edema absent.   Abdominal:      General: Bowel sounds are normal.      Palpations: Abdomen is soft.      Tenderness: There is no abdominal tenderness.   Musculoskeletal: Normal range of motion.         General: No tenderness. Skin:     General: Skin is warm and dry.      Comments: Left precordial pacemaker site nominal, strong tobacco smell   Neurological:      General: No focal deficit present.      Mental Status: Alert and oriented to person, place and time.           Lab Review:   Lab Results   Component Value Date    GLUCOSE 109 (H) 12/31/2020    BUN 28 (H) 12/31/2020    CREATININE 1.37 (H) 12/31/2020    EGFRIFNONA 53 (L) 12/31/2020    EGFRIFAFRI 64 12/31/2020    BCR 20.4 12/31/2020    CO2 25.5 12/31/2020    CALCIUM 9.1 12/31/2020    PROTENTOTREF 6.4 12/31/2020    ALBUMIN 4.20 12/31/2020    LABIL2 1.9 12/31/2020    AST 21 12/31/2020    ALT 15 12/31/2020       Lab Results   Component Value Date    WBC 6.98 12/31/2020    HGB 16.4 12/31/2020    HCT 48.5 12/31/2020    MCV 91.2 12/31/2020     12/31/2020       Lab Results   Component Value Date    TSH 0.358 12/31/2020       Lab Results   Component Value Date    TRIG 158 (H) 12/31/2020    TRIG 174 (H) 02/25/2020     Lab Results   Component Value Date    HDL 39 (L) 12/31/2020    HDL 41 02/25/2020     Lab Results   Component Value Date    LDL 97 12/31/2020     (H) 02/25/2020 05/04/2021:  · CMP: BUN 29, creatinine 1.29, sodium 137, chloride 102, carbon dioxide 20, calcium 9.2, albumin 4.3, glucose potassium and  phosphorus not performed  · Hepatic function panel: Protein 6.7, bilirubin less than 0.2, alkaline phosphatase 110, AST 22, ALT 10  · GGT 18  · CBC: WBC 7.2, RBC 4.81, hemoglobin 14.8, hematocrit 44.5, MCV 93, MCH 30.8, MCHC 33.3, RDW 12.7, platelets 204, neutrophils 58, lymphocytes 25, monocytes 12, eos 4, basos 1      [facility] remote device check 01/06/2022: 2.17-year battery longevity, HVR triggered x3, AT/AF burden 1%, 81% atrial pacing, 2.8% ventricular pacing        [employer] pacemaker interrogation 1/18/2022: 81% atrial pacing, 2.8% ventricular pacing, 2-2.3 years battery longevity, no reprogramming, less than 1% AMS, no atrial fibrillation, 15 HVR A=V 150 bpm, longest episode 7 minutes, Implant date 5/22/2013.  Assessment:       Overall continued acceptable course with no new interim cardiopulmonary complaints with acceptable functional status on limited activity. We will defer additional diagnostic or therapeutic intervention from a cardiac perspective at this time.  I advised the patient of the risks of continuing to use tobacco, and I provided this patient with smoking cessation educational materials and discussed how to quit smoking and patient has expressed the willingness to quit.  Counseled patient for 3-5 minutes. The patient did not have atrial fibrillation on device interrogation today in office.  He had less than 1% mode switching and 15 VHR A=V at 150 bpm     Diagnosis Plan   1. Chest pain syndrome  No recurrent angina pectoris or CHF on current activity schedule; continue current treatment   2. Benign hypertension   Controlled, continue current cardiac medications   3. Dyslipidemia   No new labs to review, continue lovastatin   4. Sick sinus syndrome (HCC)   Acceptable device interrogation in office today with no reprogramming   5. NETTIE on CPAP   Encouraged continued compliance   6. Tobacco abuse   Encouraged tobacco cessation          Plan:         1. Patient to continue current medications and  close follow up with the above providers.  2. Tentative cardiology follow up in August/September 2022 in the [place] office or patient may return sooner PRN.  3. [facility] pacemaker check next visit  4. Encouraged tobacco cessation      I, Noam Foster MD, [facility], personally performed the services described in this documentation as scribed by the above named individual in my presence, and it is both accurate and complete. At 15:49 EST on 01/18/2022

## 2022-04-25 NOTE — PROGRESS NOTES
"Subjective     Patient ID: Terry Mcguire is a 61 y.o. male. Patient is here for management of multiple medical problems.     Chief Complaint   Patient presents with   • Rash     Muscle spasm      History of Present Illness   Muscle spam and pain CMT. In ribs and back.     Rash.   itchan and irritated on legs. 1% cortisone not helping.          The following portions of the patient's history were reviewed and updated as appropriate: allergies, current medications, past family history, past medical history, past social history, past surgical history and problem list.    Review of Systems    Current Outpatient Medications:   •  aspirin 81 MG tablet, Take  by mouth daily., Disp: , Rfl:   •  levocetirizine (XYZAL) 5 MG tablet, Take 5 mg by mouth Daily., Disp: , Rfl: 0  •  levothyroxine (SYNTHROID, LEVOTHROID) 100 MCG tablet, Take 1 tablet by mouth Daily., Disp: 90 tablet, Rfl: 3  •  lovastatin (MEVACOR) 20 MG tablet, TAKE 1 TABLET EVERY NIGHT, Disp: 90 tablet, Rfl: 3  •  Morphine (MS CONTIN) 30 MG 12 hr tablet, take 1 tablet by mouth two times a day, Disp: , Rfl: 0  •  naproxen (NAPROSYN) 375 MG tablet, TAKE 1 TABLET BY MOUTH TWICE DAILY AS NEEDED FOR ARTHRITIS PAIN, Disp: , Rfl:   •  omeprazole (priLOSEC) 40 MG capsule, Take 1 capsule by mouth Daily., Disp: 90 capsule, Rfl: 3  •  oxyCODONE (ROXICODONE) 10 MG tablet, Take  by mouth 2 (Two) Times a Day., Disp: , Rfl:   •  tamsulosin (FLOMAX) 0.4 MG capsule 24 hr capsule, Take 0.4 mg by mouth Daily., Disp: , Rfl:   •  tiZANidine (ZANAFLEX) 4 MG tablet, Take 1 tablet by mouth Every 6 (Six) Hours As Needed for Muscle Spasms., Disp: 120 tablet, Rfl: 11  •  triamcinolone (KENALOG) 0.5 % ointment, Apply 1 application topically to the appropriate area as directed 2 (Two) Times a Day., Disp: 15 g, Rfl: 4    Objective      Blood pressure 140/78, pulse 73, temperature 97.1 °F (36.2 °C), resp. rate 16, height 167.6 cm (66\"), weight 81.2 kg (179 lb), SpO2 96 %.    Physical " Exam     General Appearance:    Alert, cooperative, no distress, appears stated age   Head:    Normocephalic, without obvious abnormality, atraumatic   Eyes:    PERRL, conjunctiva/corneas clear, EOM's intact   Ears:    Normal TM's and external ear canals, both ears   Nose:   Nares normal, septum midline, mucosa normal, no drainage   or sinus tenderness   Throat:   Lips, mucosa, and tongue normal; teeth and gums normal   Neck:   Supple, symmetrical, trachea midline, no adenopathy;        thyroid:  No enlargement/tenderness/nodules; no carotid    bruit or JVD   Back:     Symmetric, no curvature, ROM normal, no CVA tenderness   Lungs:     Clear to auscultation bilaterally, respirations unlabored   Chest wall:    No tenderness or deformity   Heart:    Regular rate and rhythm, S1 and S2 normal, no murmur,        rub or gallop   Abdomen:     Soft, non-tender, bowel sounds active all four quadrants,     no masses, no organomegaly   Extremities:   Extremities normal, atraumatic, no cyanosis or edema   Pulses:   2+ and symmetric all extremities   Skin:   Skin color, texture, turgor normal, no rashes or lesions   Lymph nodes:   Cervical, supraclavicular, and axillary nodes normal   Neurologic:   CNII-XII intact. Normal strength, sensation and reflexes       throughout      Results for orders placed or performed in visit on 12/30/20   Lipid Panel    Specimen: Blood   Result Value Ref Range    Total Cholesterol 164 0 - 200 mg/dL    Triglycerides 158 (H) 0 - 150 mg/dL    HDL Cholesterol 39 (L) 40 - 60 mg/dL    VLDL Cholesterol [place] 28 5 - 40 mg/dL    LDL Chol Calc (NIH) 97 0 - 100 mg/dL   PSA Screen    Specimen: Blood   Result Value Ref Range    PSA 0.424 0.000 - 4.000 ng/mL   Comprehensive Metabolic Panel    Specimen: Blood   Result Value Ref Range    Glucose 109 (H) 65 - 99 mg/dL    BUN 28 (H) 8 - 23 mg/dL    Creatinine 1.37 (H) 0.76 - 1.27 mg/dL    eGFR Non African Am 53 (L) >60 mL/min/1.73    eGFR African Am 64 >60  mL/min/1.73    BUN/Creatinine Ratio 20.4 7.0 - 25.0    Sodium 141 136 - 145 mmol/L    Potassium 4.6 3.5 - 5.2 mmol/L    Chloride 108 (H) 98 - 107 mmol/L    Total CO2 25.5 22.0 - 29.0 mmol/L    Calcium 9.1 8.6 - 10.5 mg/dL    Total Protein 6.4 6.0 - 8.5 g/dL    Albumin 4.20 3.50 - 5.20 g/dL    Globulin 2.2 gm/dL    A/G Ratio 1.9 g/dL    Total Bilirubin 0.2 0.0 - 1.2 mg/dL    Alkaline Phosphatase 97 39 - 117 U/L    AST (SGOT) 21 1 - 40 U/L    ALT (SGPT) 15 1 - 41 U/L   Vitamin B12    Specimen: Blood   Result Value Ref Range    Vitamin B-12 632 211 - 946 pg/mL   TSH    Specimen: Blood   Result Value Ref Range    TSH 0.358 0.270 - 4.200 uIU/mL   T4, Free    Specimen: Blood   Result Value Ref Range    Free T4 1.50 0.93 - 1.70 ng/dL   Vitamin B6    Specimen: Blood   Result Value Ref Range    Vitamin B6 8.2 5.3 - 46.7 ug/L   CBC & Differential    Specimen: Blood   Result Value Ref Range    WBC 6.98 3.40 - 10.80 10*3/mm3    RBC 5.32 4.14 - 5.80 10*6/mm3    Hemoglobin 16.4 13.0 - 17.7 g/dL    Hematocrit 48.5 37.5 - 51.0 %    MCV 91.2 79.0 - 97.0 fL    MCH 30.8 26.6 - 33.0 pg    MCHC 33.8 31.5 - 35.7 g/dL    RDW 13.0 12.3 - 15.4 %    Platelets 207 140 - 450 10*3/mm3    Neutrophil Rel % 52.2 42.7 - 76.0 %    Lymphocyte Rel % 33.0 19.6 - 45.3 %    Monocyte Rel % 9.7 5.0 - 12.0 %    Eosinophil Rel % 3.9 0.3 - 6.2 %    Basophil Rel % 1.1 0.0 - 1.5 %    Neutrophils Absolute 3.64 1.70 - 7.00 10*3/mm3    Lymphocytes Absolute 2.30 0.70 - 3.10 10*3/mm3    Monocytes Absolute 0.68 0.10 - 0.90 10*3/mm3    Eosinophils Absolute 0.27 0.00 - 0.40 10*3/mm3    Basophils Absolute 0.08 0.00 - 0.20 10*3/mm3    Immature Granulocyte Rel % 0.1 0.0 - 0.5 %    Immature Grans Absolute 0.01 0.00 - 0.05 10*3/mm3    nRBC 0.0 0.0 - 0.2 /100 WBC         Assessment/Plan   muscl spasem in chest. Start foam rollar.  Change meds flexril to zanaflex.      Diagnoses and all orders for this visit:    1. Rib pain (Primary)  -     PSA Screen  -     Lipid Panel  -      CBC & Differential  -     Vitamin B12  -     Comprehensive Metabolic Panel  -     TSH  -     T4, Free  -     Hemoglobin A1c  -     MicroAlbumin, Urine, Random - Urine, Clean Catch  -     Vitamin B6  -     [place] Spotted Fever, IgM  -     [place] Spotted Fever, IgG  -     Uric acid  -     Lyme Disease, PCR - , Arm, Right  -     Ehrlichia Antibody Panel  -     Cyclic Citrul Peptide Antibody, IgG / IgA  -     Rheumatoid Factor  -     Sedimentation Rate  -     C-reactive Protein  -     CT Chest Without Contrast Diagnostic; Future    2. Other eczema  -     PSA Screen  -     Lipid Panel  -     CBC & Differential  -     Vitamin B12  -     Comprehensive Metabolic Panel  -     TSH  -     T4, Free  -     Hemoglobin A1c  -     MicroAlbumin, Urine, Random - Urine, Clean Catch  -     Vitamin B6  -     [place] Spotted Fever, IgM  -     [place] Spotted Fever, IgG  -     Uric acid  -     Lyme Disease, PCR - , Arm, Right  -     Ehrlichia Antibody Panel  -     Cyclic Citrul Peptide Antibody, IgG / IgA  -     Rheumatoid Factor  -     Sedimentation Rate  -     C-reactive Protein  -     CT Chest Without Contrast Diagnostic; Future    3. Acquired hypothyroidism  -     PSA Screen  -     Lipid Panel  -     CBC & Differential  -     Vitamin B12  -     Comprehensive Metabolic Panel  -     TSH  -     T4, Free  -     Hemoglobin A1c  -     MicroAlbumin, Urine, Random - Urine, Clean Catch  -     Vitamin B6  -     [place] Spotted Fever, IgM  -     [place] Spotted Fever, IgG  -     Uric acid  -     Lyme Disease, PCR - , Arm, Right  -     Ehrlichia Antibody Panel  -     Cyclic Citrul Peptide Antibody, IgG / IgA  -     Rheumatoid Factor  -     Sedimentation Rate  -     C-reactive Protein  -     CT Chest Without Contrast Diagnostic; Future    4. Arthritis  -     PSA Screen  -     Lipid Panel  -     CBC & Differential  -     Vitamin B12  -     Comprehensive Metabolic Panel  -     TSH  -     T4, Free  -     Hemoglobin A1c  -      MicroAlbumin, Urine, Random - Urine, Clean Catch  -     Vitamin B6  -     [place] Spotted Fever, IgM  -     [place] Spotted Fever, IgG  -     Uric acid  -     Lyme Disease, PCR - , Arm, Right  -     Ehrlichia Antibody Panel  -     Cyclic Citrul Peptide Antibody, IgG / IgA  -     Rheumatoid Factor  -     Sedimentation Rate  -     C-reactive Protein  -     CT Chest Without Contrast Diagnostic; Future    5. Lung nodule  -     PSA Screen  -     Lipid Panel  -     CBC & Differential  -     Vitamin B12  -     Comprehensive Metabolic Panel  -     TSH  -     T4, Free  -     Hemoglobin A1c  -     MicroAlbumin, Urine, Random - Urine, Clean Catch  -     Vitamin B6  -     [place] Spotted Fever, IgM  -     [place] Spotted Fever, IgG  -     Uric acid  -     Lyme Disease, PCR - , Arm, Right  -     Ehrlichia Antibody Panel  -     Cyclic Citrul Peptide Antibody, IgG / IgA  -     Rheumatoid Factor  -     Sedimentation Rate  -     C-reactive Protein  -     CT Chest Without Contrast Diagnostic; Future    6. Encounter for screening for malignant neoplasm of prostate   -     PSA Screen    7. Tobacco abuse  -     CT Chest Without Contrast Diagnostic; Future    8. Abnormal finding of blood chemistry, unspecified   -     Hemoglobin A1c    Other orders  -     levothyroxine (SYNTHROID, LEVOTHROID) 100 MCG tablet; Take 1 tablet by mouth Daily.  Dispense: 90 tablet; Refill: 3  -     tiZANidine (ZANAFLEX) 4 MG tablet; Take 1 tablet by mouth Every 6 (Six) Hours As Needed for Muscle Spasms.  Dispense: 120 tablet; Refill: 11  -     triamcinolone (KENALOG) 0.5 % ointment; Apply 1 application topically to the appropriate area as directed 2 (Two) Times a Day.  Dispense: 15 g; Refill: 4      Return in about 6 weeks (around 6/6/2022).          There are no Patient Instructions on file for this visit.     Segundo Vo MD    Assessment/Plan

## 2022-04-30 NOTE — PROGRESS NOTES
Please let them know the uric acid a bit high. Vit b6 to low. Allopurinol and bcomplex with c. Sent to pharmacy'

## 2022-05-11 NOTE — TELEPHONE ENCOUNTER
Rx Refill Note  Requested Prescriptions     Pending Prescriptions Disp Refills   • levothyroxine (SYNTHROID, LEVOTHROID) 100 MCG tablet [Pharmacy Med Name: LEVOTHYROXINE SODIUM 100 MCG Tablet] 90 tablet 3     Sig: TAKE 1 TABLET EVERY DAY      Last office visit with prescribing clinician: 4/25/2022      Next office visit with prescribing clinician: 6/13/2022            Dyana Mary LPN  05/11/22, 14:50 EDT

## 2022-05-13 NOTE — TELEPHONE ENCOUNTER
Caller: Terry Mcguire    Relationship: Self    Best call back number: [phone]    Requested Prescriptions:   Requested Prescriptions     Pending Prescriptions Disp Refills   • omeprazole (priLOSEC) 40 MG capsule 90 capsule 3     Sig: Take 1 capsule by mouth Daily.        Pharmacy where request should be sent:  [facility]    Additional details provided by patient: PATIENT NEEDS REFILL    Does the patient have less than a 3 day supply:  [] Yes  [x] No    Shara Bustos Rep   05/13/22 13:04 EDT

## 2022-05-13 NOTE — TELEPHONE ENCOUNTER
Rx Refill Note  Requested Prescriptions     Pending Prescriptions Disp Refills   • omeprazole (priLOSEC) 40 MG capsule 90 capsule 3     Sig: Take 1 capsule by mouth Daily.      Last office visit with prescribing clinician: 4/25/2022      Next office visit with prescribing clinician: 6/13/2022            SIDDHARTH CHÁVEZ MA  05/13/22, 14:40 EDT

## 2022-05-17 PROBLEM — R63.4 WEIGHT LOSS: Status: ACTIVE | Noted: 2022-01-01

## 2022-05-17 PROBLEM — R91.8 LUNG MASS: Status: ACTIVE | Noted: 2022-01-01

## 2022-05-17 NOTE — PROGRESS NOTES
"  PULMONARY  NOTE    Chief Complaint     Lung mass, tobacco abuse, dyspnea on exertion    History of Present Illness     61-year-old male referred for evaluation of an abnormal CT scan of the chest    He has a history of tobacco abuse which is ongoing  He has smoked up to about 1 pack cigarettes per day but is \"cutting back\"  He does not have a smoking cessation date set, yet    He has a history of pulmonary nodules that had been followed on chest imaging  It appears that on his most recent CT scan there was about 4 mm nodule that was noted    However he underwent a repeat CT scan of the chest which revealed a right middle lobe mass which was new and as noted below    He has a cough but has never had hemoptysis  He has dyspnea on exertion  He gets short of breath bending over or going up 1 flight of stairs  He has not been on any respiratory medications.    He has lost weight over the last year or so which she attributes to changing to diet pop  He is gone from 218 pounds down to 176 pounds.    Patient Active Problem List   Diagnosis   • Chronic obstructive pulmonary disease (HCC)   • Eczema   • Hypercholesterolemia   • Hyperthyroidism   • Hypothyroidism   • Nausea   • NETTIE on CPAP   • Hereditary sensorimotor neuropathy   • Cerebrovascular accident (HCC)   • Vitamin D deficiency   • Benign hypertension   • Chest pain syndrome   • Tobacco abuse   • Seasonal allergic rhinitis   • Osteoarthritis   • Gastroesophageal reflux disease with hiatal hernia   • Essential tremor   • Charcot-Jolene-Tooth disease   • Obesity   • Sick sinus syndrome (HCC)   • Skin lesion of chest wall   • Dyslipidemia   • Abnormal electrocardiogram    • RML Lung mass   • Weight loss     No Known Allergies    Current Outpatient Medications:   •  allopurinol (Zyloprim) 100 MG tablet, Take 1 tablet by mouth Daily., Disp: 90 tablet, Rfl: 3  •  aspirin 81 MG tablet, Take  by mouth daily., Disp: , Rfl:   •  B Complex-C (B-complex with vitamin C) tablet, " Take 1 tablet by mouth Daily., Disp: 90 tablet, Rfl: 3  •  levocetirizine (XYZAL) 5 MG tablet, Take 5 mg by mouth Daily., Disp: , Rfl: 0  •  levothyroxine (SYNTHROID, LEVOTHROID) 100 MCG tablet, TAKE 1 TABLET EVERY DAY, Disp: 90 tablet, Rfl: 3  •  lovastatin (MEVACOR) 20 MG tablet, TAKE 1 TABLET EVERY NIGHT, Disp: 90 tablet, Rfl: 3  •  Morphine (MS CONTIN) 30 MG 12 hr tablet, take 1 tablet by mouth two times a day, Disp: , Rfl: 0  •  naproxen (NAPROSYN) 375 MG tablet, Take 375 mg by mouth 2 (Two) Times a Day With Meals., Disp: , Rfl:   •  omeprazole (priLOSEC) 40 MG capsule, Take 1 capsule by mouth Daily., Disp: 90 capsule, Rfl: 3  •  oxyCODONE (ROXICODONE) 10 MG tablet, Take  by mouth 2 (Two) Times a Day., Disp: , Rfl:   •  tamsulosin (FLOMAX) 0.4 MG capsule 24 hr capsule, Take 0.4 mg by mouth Daily., Disp: , Rfl:   •  tiZANidine (ZANAFLEX) 4 MG tablet, Take 1 tablet by mouth Every 6 (Six) Hours As Needed for Muscle Spasms., Disp: 120 tablet, Rfl: 11  •  triamcinolone (KENALOG) 0.5 % ointment, Apply 1 application topically to the appropriate area as directed 2 (Two) Times a Day., Disp: 15 g, Rfl: 4  MEDICATION LIST AND ALLERGIES REVIEWED.    Family History   Problem Relation Age of Onset   • Dementia Father         With lewey bodies   • Cancer Mother      Social History     Tobacco Use   • Smoking status: Current Every Day Smoker     Packs/day: 1.00     Years: 46.00     Pack years: 46.00     Types: Cigarettes   • Smokeless tobacco: Never Used   Vaping Use   • Vaping Use: Never used   Substance Use Topics   • Alcohol use: Yes     Comment: once a year   • Drug use: No     Social History     Social History Narrative    Ongoing tobacco abuse    Has worked as a philip and small engine repair     FAMILY AND SOCIAL HISTORY REVIEWED.    Review of Systems  ALSO REFER TO SCANNED ROS SHEET FROM SAME DATE.    /70 (BP Location: Left arm, Patient Position: Sitting, Cuff Size: Adult)   Pulse 89   Temp 97.3 °F (36.3 °C)  "  Resp 18   Ht 167.6 cm (66\")   Wt 79.9 kg (176 lb 2 oz)   SpO2 95% Comment: room air at rest  BMI 28.43 kg/m²   Physical Exam  Vitals and nursing note reviewed.   Constitutional:       General: He is not in acute distress.     Appearance: He is well-developed. He is not diaphoretic.   HENT:      Head: Normocephalic and atraumatic.   Neck:      Thyroid: No thyromegaly.   Cardiovascular:      Rate and Rhythm: Normal rate and regular rhythm.      Heart sounds: Normal heart sounds. No murmur heard.  Pulmonary:      Effort: Pulmonary effort is normal.      Breath sounds: Normal breath sounds. No stridor.   Chest:   Breasts:      Right: No supraclavicular adenopathy.      Left: No supraclavicular adenopathy.       Lymphadenopathy:      Cervical: No cervical adenopathy.      Upper Body:      Right upper body: No supraclavicular or epitrochlear adenopathy.      Left upper body: No supraclavicular or epitrochlear adenopathy.   Skin:     General: Skin is warm and dry.   Neurological:      Mental Status: He is alert and oriented to person, place, and time.   Psychiatric:         Behavior: Behavior normal.         Results     Chest CT reviewed on PACS.  RML peripheral mass new from prior CT    Immunization History   Administered Date(s) Administered   • Flu Vaccine Quad PF >36MO 10/30/2017   • Fluzone Split Quad (Multi-dose) 10/12/2016   • Pneumococcal Polysaccharide (PPSV23) 10/12/2016     Problem List       ICD-10-CM ICD-9-CM   1. RML Lung mass  R91.8 786.6   2. Tobacco abuse  Z72.0 305.1   3. NETTIE on CPAP  G47.33 327.23    Z99.89 V46.8   4. Weight loss  R63.4 783.21       Discussion     We discussed his chest CT  Is certainly worrisome for a primary bronchogenic carcinoma  This is a peripheral lesion and should be accessible via CT FNA  He needs a PET scan, as well  Ultimately, if this appears to be localized disease, he will need PFTs    He already has an appointment to see Dr. Durant at the end of the month.  " Hopefully will have this work-up completed by the time he sees Dr. Durant.    We discussed the need for smoking cessation and briefly discussed smoking cessation strategies    Moderate level of Medical Decision Making complexity based on 1 undiagnosed new problem, independent interpretation of tests, and/or prescription drug management     Stanton Taylor MD  Note electronically signed    CC: Segundo Vo MD

## 2022-05-17 NOTE — PROGRESS NOTES
Met patient and wife in lung nodule clinic with Dr. CHINA Taylor. 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 He has lost roughly 40lb in the last year unintentionally. He does admit to changing to diet drinks but wife states he did this 3-4 years ago. He denies hemoptysis. He does have significant SOA with activity. He has history of pulmonary nodules and had repeat CT scan revealing new 3.6 x 2.5cm RML. Scans reviewed. Per Dr. Taylor PET and CT guided biopsy now. Patient is already scheduled to see Dr. Durant 05/31/2022, will try to coordinate all testing prior to this appointment. He v/u and agreeable to plan. Introduced lung navigator role and provided contact information. He knows to call with questions or concerns.

## 2022-06-01 NOTE — NURSING NOTE
CT guided right lung biopsy performed by Dr Cain. Four samples obtained and sent to the lab for testing.  Island dressing placed. Patient was sedated for 25 minutes, and was given 1 mg Versed and 100 mcg fentanyl. Patient tolerated well. Patient placed right side down with bed rest orders x 2 hours.  Post CXR ordered. Report called the nurse.

## 2022-06-01 NOTE — PRE-PROCEDURE NOTE
[facility]   Vascular Interventional Radiology  History & Physicial    Patient Name:Terry Mcguire    : 1960    MRN: [id number]    Primary Care Physician: Segundo Vo MD    Referring Physician: Stanton Taylor, *     Date of admission: 2022    Subjective     Reason for Consult: Lung biopsy    History of Present Illness     Terry Mcguire is a 61 y.o. male referred to IR as noted above.      Active Hospital Problems:  There are no active hospital problems to display for this patient.      Personal History     Past Medical History:   Diagnosis Date   • Abnormal electrocardiogram     • Benign hypertension 2016   • Bradycardia 2016   • Charcot-Jolene disease    • Chest pain syndrome 2016   • COPD (chronic obstructive pulmonary disease) ([facility])    • Essential tremor 2016   • Essential tremor    • Gastroesophageal reflux disease with hiatal hernia 2016   • Hypercholesterolemia    • Hypothyroidism    • Obesity 2016   • NTETIE on CPAP    • Osteoarthritis 2016   • Seasonal allergic rhinitis 2016   • Sick sinus syndrome (HCC) 2016   • Stroke ([facility])        Past Surgical History:   Procedure Laterality Date   • ANKLE SURGERY Left    • CARDIAC CATHETERIZATION     • CARPAL TUNNEL RELEASE Bilateral    • COLONOSCOPY      multiple   • HERNIA REPAIR     • INSERT / REPLACE / REMOVE PACEMAKER  2006    Have had it replaced once   • OTHER SURGICAL HISTORY Bilateral     Elbow surgery   • PACEMAKER IMPLANTATION     • TOOTH EXTRACTION         Family History: His family history includes Cancer in his mother; Dementia in his father.     Social History: He  reports that he has been smoking cigarettes. He has a 46.00 pack-year smoking history. He has never used smokeless tobacco. He reports current alcohol use. He reports that he does not use drugs.    Home Medications:  B-complex with vitamin C, Morphine, allopurinol, aspirin, levocetirizine, levothyroxine,  "lovastatin, naproxen, omeprazole, oxyCODONE, tamsulosin, tiZANidine, and triamcinolone    Current Medications:  •  fentaNYL citrate (PF)  •  midazolam  •  sodium chloride  •  sodium chloride     Allergies:  He has No Known Allergies.    Review of Systems    Objective     Visit Vitals  /93   Pulse 70   Temp 97 °F (36.1 °C)   Ht 165.1 cm (65\")   Wt 80.6 kg (177 lb 9.6 oz)   SpO2 95%   BMI 29.55 kg/m²        Physical Exam    A&Ox3.   Able to communicate  No Apparent Distress  Average physique        Result Review      I have personally reviewed the results from the time of this admission to 6/1/2022 11:00 EDT and agree with these findings.  [x]  Laboratory  []  Microbiology  [x]  Radiology  []  EKG/Telemetry   []  Cardiology/Vascular   []  Pathology  []  Old records  []  Other:    Most notable findings include: As noted:    Results from last 7 days   Lab Units 06/01/22  1012   INR  0.97   HEMOGLOBIN g/dL 13.4   HEMATOCRIT % 39.2   PLATELETS 10*3/mm3 274       CrCl cannot be calculated (Patient's most recent lab result is older than the maximum 30 days allowed.). No results found for: CREATININE    No results found for: COVID19     No results found for: PREGTESTUR, PREGSERUM, HCG, HCGQUANT       Assessment / Plan     Terry Mcguire is a 61 y.o. male referred to the IR service with above problem.    Plan:   As above.    Mono Cain MD   Vascular Interventional Radiology  06/01/22   11:00 AM EDT   "

## 2022-06-01 NOTE — DISCHARGE INSTRUCTIONS
Avoid any strenuous activities, pulling, tugging, straining or lifting anything over 10 pounds for the next 3 to 4 days.     You may remove the bandaid (if you have one) tomorrow and shower tomorrow; but you will need to avoid tub baths or any situation where your are submersed in water for the next 4 or 5 days to avoid the risk of infection.     DO NOT DRIVE ON THE DAY OF YOUR PROCEDURE.    If you have any problems or concern please contact your physician's office.      SEEK IMMEDIATE MEDICAL ATTENTION IF YOU HAVE SEVERE PAIN IN YOUR CHEST, BACK OR SHOULDER ON BIOPSY SIDE OR COUGH UP BRIGHT RED SPUTUM.     YOU MAY RESUME YOUR ASPIRIN TOMORROW UNLESS OTHERWISE INSTRUCTED BY YOUR PHYSICIAN.

## 2022-06-07 NOTE — PROGRESS NOTES
The patient underwent a PET CT scan  The right lung lesion is markedly, abnormally, hypermetabolic  Highly consistent with malignancy  Luckily no abnormal hypermetabolic activity elsewhere    The CT FNA unfortunately was nondiagnostic  Only normal lung tissue was identified that does not explain the abnormal right lung lesion    I discussed these results with the patient on the phone  He is scheduled to see Dr. Villa in the office    I will leave it up to Dr. Villa and the patient to decide whether they want to go directly to surgical resection versus doing a navigational bronchoscopy  The patient assured me he will keep his appointment with Dr. Villa

## 2022-06-13 NOTE — PROGRESS NOTES
Subjective     Patient ID: Terry Mcguire is a 62 y.o. male. Patient is here for management of multiple medical problems.     Chief Complaint   Patient presents with   • Chronic Kidney Disease     History of Present Illness   Lung lesion.   + pet. Neg bx.  Recommended to have resection.    Will See Dr Geiger for options    ckd        The following portions of the patient's history were reviewed and updated as appropriate: allergies, current medications, past family history, past medical history, past social history, past surgical history and problem list.    Review of Systems    Current Outpatient Medications:   •  allopurinol (Zyloprim) 100 MG tablet, Take 1 tablet by mouth Daily., Disp: 90 tablet, Rfl: 3  •  aspirin 81 MG tablet, Take  by mouth daily., Disp: , Rfl:   •  B Complex-C (B-complex with vitamin C) tablet, Take 1 tablet by mouth Daily., Disp: 90 tablet, Rfl: 3  •  levocetirizine (XYZAL) 5 MG tablet, Take 5 mg by mouth Daily., Disp: , Rfl: 0  •  levothyroxine (SYNTHROID, LEVOTHROID) 100 MCG tablet, TAKE 1 TABLET EVERY DAY, Disp: 90 tablet, Rfl: 3  •  lovastatin (MEVACOR) 20 MG tablet, TAKE 1 TABLET EVERY NIGHT, Disp: 90 tablet, Rfl: 3  •  Morphine (MS CONTIN) 30 MG 12 hr tablet, take 1 tablet by mouth two times a day, Disp: , Rfl: 0  •  naproxen (NAPROSYN) 375 MG tablet, Take 375 mg by mouth 2 (Two) Times a Day With Meals., Disp: , Rfl:   •  omeprazole (priLOSEC) 40 MG capsule, Take 1 capsule by mouth Daily., Disp: 90 capsule, Rfl: 3  •  oxyCODONE (ROXICODONE) 10 MG tablet, Take  by mouth 2 (Two) Times a Day., Disp: , Rfl:   •  tamsulosin (FLOMAX) 0.4 MG capsule 24 hr capsule, Take 0.4 mg by mouth Daily., Disp: , Rfl:   •  tiZANidine (ZANAFLEX) 4 MG tablet, Take 1 tablet by mouth Every 6 (Six) Hours As Needed for Muscle Spasms., Disp: 120 tablet, Rfl: 11  •  triamcinolone (KENALOG) 0.5 % ointment, Apply 1 application topically to the appropriate area as directed 2 (Two) Times a Day., Disp: 15 g, Rfl:  "4    Objective      Blood pressure 130/64, pulse 92, temperature 98.7 °F (37.1 °C), resp. rate 16, height 167.6 cm (66\"), weight 80.7 kg (178 lb), SpO2 97 %.    Physical Exam     General Appearance:    Alert, cooperative, no distress, appears stated age   Head:    Normocephalic, without obvious abnormality, atraumatic   Eyes:    PERRL, conjunctiva/corneas clear, EOM's intact   Ears:    Normal TM's and external ear canals, both ears   Nose:   Nares normal, septum midline, mucosa normal, no drainage   or sinus tenderness   Throat:   Lips, mucosa, and tongue normal; teeth and gums normal   Neck:   Supple, symmetrical, trachea midline, no adenopathy;        thyroid:  No enlargement/tenderness/nodules; no carotid    bruit or JVD   Back:     Symmetric, no curvature, ROM normal, no CVA tenderness   Lungs:     Clear to auscultation bilaterally, respirations unlabored   Chest wall:    No tenderness or deformity   Heart:    Regular rate and rhythm, S1 and S2 normal, no murmur,        rub or gallop   Abdomen:     Soft, non-tender, bowel sounds active all four quadrants,     no masses, no organomegaly   Extremities:   Extremities normal, atraumatic, no cyanosis or edema   Pulses:   2+ and symmetric all extremities   Skin:   Skin color, texture, turgor normal, no rashes or lesions   Lymph nodes:   Cervical, supraclavicular, and axillary nodes normal   Neurologic:   CNII-XII intact. Normal strength, sensation and reflexes       throughout      Results for orders placed or performed during the hospital encounter of 06/06/22   POC Glucose Once    Specimen: Blood   Result Value Ref Range    Glucose 94 70 - 130 mg/dL         Assessment & Plan   Lung nodule    Still smoking.   Trying to quite. Has cut down.        Diagnoses and all orders for this visit:    1. Benign hypertension (Primary)  -     Vitamin B12  -     Comprehensive Metabolic Panel  -     CBC & Differential  -     Lipid Panel  -     PSA Screen  -     TSH  -     T4, " Free    2. Hypercholesterolemia  -     Vitamin B12  -     Comprehensive Metabolic Panel  -     CBC & Differential  -     Lipid Panel  -     PSA Screen  -     TSH  -     T4, Free    3. Lung nodule  -     Vitamin B12  -     Comprehensive Metabolic Panel  -     CBC & Differential  -     Lipid Panel  -     PSA Screen  -     TSH  -     T4, Free    4. Acquired hypothyroidism  -     Vitamin B12  -     Comprehensive Metabolic Panel  -     CBC & Differential  -     Lipid Panel  -     PSA Screen  -     TSH  -     T4, Free    5. Encounter for screening for malignant neoplasm of prostate   -     PSA Screen      Return in about 3 months (around 9/13/2022).          There are no Patient Instructions on file for this visit.     Segundo Vo MD    Assessment & Plan

## 2022-06-14 NOTE — TELEPHONE ENCOUNTER
Noted; mild-moderate cardiovascular risk with continued tobacco use.  Cardiac status should be acceptable.    Thanks!

## 2022-06-14 NOTE — PROGRESS NOTES
06/14/2022  Patient Information  Terry Mcguire                                                                                          [address] [facility] [place] [address]   1960  'PCP/Referring Physician'  Segundo Vo MD  [phone]  Segundo Vo MD  [phone]  Chief Complaint   Patient presents with   • Consult     Np referred for a right lung mass,complains of some shooting pain in the right side of his chest.       History of Present Illness: 62-year-old  male with a history of hyperlipidemia, stroke, active tobacco abuse and Charcot-Jolene-Tooth disease who presents with a right lung mass.  Overall, the patient is doing well and is without complaints.  He denies cough, hemoptysis, lymphadenopathy, fevers or chills.  Around 2 months ago, the patient had an episode of right back pain radiating to the right anterior chest lasting 1 week with spontaneous resolution.  Over the past year and a half, the patient has lost approximately 40 pounds.  His only change has been converting from regular Mountain Dew to diet Mountain Dew.      Patient Active Problem List   Diagnosis   • Chronic obstructive pulmonary disease (HCC)   • Eczema   • Hypercholesterolemia   • Hyperthyroidism   • Hypothyroidism   • Nausea   • NETTIE on CPAP   • Hereditary sensorimotor neuropathy   • Cerebrovascular accident (HCC)   • Vitamin D deficiency   • Benign hypertension   • Chest pain syndrome   • Tobacco abuse   • Seasonal allergic rhinitis   • Osteoarthritis   • Gastroesophageal reflux disease with hiatal hernia   • Essential tremor   • Charcot-Jolene-Tooth disease   • Obesity   • Sick sinus syndrome (HCC)   • Skin lesion of chest wall   • Dyslipidemia   • Abnormal electrocardiogram    • RML Lung mass   • Weight loss     Past Medical History:   Diagnosis Date   • Abnormal electrocardiogram     • Benign hypertension 9/28/2016   • Bradycardia 9/28/2016   • Charcot-Jolene disease    • Chest pain syndrome 9/28/2016    • COPD (chronic obstructive pulmonary disease) ([facility])    • Essential tremor 9/28/2016   • Essential tremor    • Gastroesophageal reflux disease with hiatal hernia 9/28/2016   • Hypercholesterolemia    • Hypothyroidism    • Obesity 9/28/2016   • NETTIE on CPAP    • Osteoarthritis 9/28/2016   • Seasonal allergic rhinitis 9/28/2016   • Sick sinus syndrome (HCC) 9/28/2016   • Stroke ([facility]) 1999     Past Surgical History:   Procedure Laterality Date   • ANKLE SURGERY Left    • CARDIAC CATHETERIZATION     • CARPAL TUNNEL RELEASE Bilateral    • COLONOSCOPY      multiple   • HERNIA REPAIR     • INSERT / REPLACE / REMOVE PACEMAKER  2006    Have had it replaced once   • OTHER SURGICAL HISTORY Bilateral     Elbow surgery   • PACEMAKER IMPLANTATION     • TOOTH EXTRACTION         Current Outpatient Medications:   •  allopurinol (Zyloprim) 100 MG tablet, Take 1 tablet by mouth Daily., Disp: 90 tablet, Rfl: 3  •  aspirin 81 MG tablet, Take  by mouth daily., Disp: , Rfl:   •  levocetirizine (XYZAL) 5 MG tablet, Take 5 mg by mouth Daily., Disp: , Rfl: 0  •  levothyroxine (SYNTHROID, LEVOTHROID) 100 MCG tablet, TAKE 1 TABLET EVERY DAY, Disp: 90 tablet, Rfl: 3  •  lovastatin (MEVACOR) 20 MG tablet, TAKE 1 TABLET EVERY NIGHT, Disp: 90 tablet, Rfl: 3  •  Morphine (MS CONTIN) 30 MG 12 hr tablet, take 1 tablet by mouth two times a day, Disp: , Rfl: 0  •  naproxen (NAPROSYN) 375 MG tablet, Take 375 mg by mouth 2 (Two) Times a Day With Meals., Disp: , Rfl:   •  omeprazole (priLOSEC) 40 MG capsule, Take 1 capsule by mouth Daily., Disp: 90 capsule, Rfl: 3  •  oxyCODONE (ROXICODONE) 10 MG tablet, Take  by mouth 2 (Two) Times a Day., Disp: , Rfl:   •  tamsulosin (FLOMAX) 0.4 MG capsule 24 hr capsule, Take 0.4 mg by mouth Daily., Disp: , Rfl:   •  tiZANidine (ZANAFLEX) 4 MG tablet, Take 1 tablet by mouth Every 6 (Six) Hours As Needed for Muscle Spasms., Disp: 120 tablet, Rfl: 11  •  triamcinolone (KENALOG) 0.5 % ointment, Apply 1 application  topically to the appropriate area as directed 2 (Two) Times a Day., Disp: 15 g, Rfl: 4  •  B Complex-C (B-complex with vitamin C) tablet, Take 1 tablet by mouth Daily., Disp: 90 tablet, Rfl: 3  No Known Allergies  Social History     Socioeconomic History   • Marital status:    Tobacco Use   • Smoking status: Current Every Day Smoker     Packs/day: 1.00     Years: 46.00     Pack years: 46.00     Types: Cigarettes   • Smokeless tobacco: Never Used   Vaping Use   • Vaping Use: Never used   Substance and Sexual Activity   • Alcohol use: Yes     Comment: once a year   • Drug use: No   • Sexual activity: Defer     Family History   Problem Relation Age of Onset   • Dementia Father         With lewey bodies   • Cancer Mother      Review of Systems   Constitutional: Positive for diaphoresis, malaise/fatigue and weight loss (42lbs over the past year). Negative for chills, fever and night sweats.   HENT: Negative for hearing loss, odynophagia and sore throat.    Cardiovascular: Positive for dyspnea on exertion. Negative for chest pain, leg swelling, orthopnea and palpitations.   Respiratory: Positive for cough and sleep disturbances due to breathing. Negative for hemoptysis.    Endocrine: Negative for cold intolerance, heat intolerance, polydipsia, polyphagia and polyuria.   Hematologic/Lymphatic: Bruises/bleeds easily.   Skin: Negative for itching and rash.   Musculoskeletal: Positive for back pain, joint pain, joint swelling, neck pain and stiffness. Negative for myalgias.   Gastrointestinal: Positive for constipation. Negative for abdominal pain, diarrhea, hematemesis, hematochezia, melena, nausea and vomiting.   Genitourinary: Negative for dysuria, frequency and hematuria.   Neurological: Positive for dizziness, headaches and tremors. Negative for focal weakness, numbness and seizures.   Psychiatric/Behavioral: Negative.  Negative for suicidal ideas.   Allergic/Immunologic: Positive for environmental allergies.  "  All other systems reviewed and are negative.    Vitals:    06/14/22 1139   BP: 116/81   BP Location: Right arm   Patient Position: Sitting   Pulse: 90   Temp: 97.1 °F (36.2 °C)   SpO2: 99%   Weight: 80.7 kg (178 lb)   Height: 167.6 cm (66\")      Physical Exam  Vitals reviewed.   Constitutional:       General: He is not in acute distress.     Appearance: Normal appearance.      Comments:  male who appears stated age and is present with his wife   HENT:      Head: Normocephalic and atraumatic.      Nose: Nose normal.   Eyes:      General: No scleral icterus.  Cardiovascular:      Rate and Rhythm: Normal rate and regular rhythm.      Heart sounds: No murmur heard.    No friction rub. No gallop.   Pulmonary:      Effort: Pulmonary effort is normal. No respiratory distress.      Breath sounds: Normal breath sounds. No rhonchi.   Chest:   Breasts:      Right: No axillary adenopathy or supraclavicular adenopathy.      Left: No axillary adenopathy or supraclavicular adenopathy.       Abdominal:      General: There is no distension.      Palpations: Abdomen is soft. There is no mass.      Tenderness: There is no abdominal tenderness. There is no guarding or rebound.   Musculoskeletal:         General: No deformity.      Cervical back: Neck supple.      Right lower leg: No edema.      Left lower leg: No edema.   Lymphadenopathy:      Cervical: No cervical adenopathy.      Upper Body:      Right upper body: No supraclavicular or axillary adenopathy.      Left upper body: No supraclavicular or axillary adenopathy.   Skin:     General: Skin is warm and dry.      Coloration: Skin is not jaundiced.   Neurological:      Mental Status: He is alert and oriented to person, place, and time.      Gait: Gait normal.   Psychiatric:         Mood and Affect: Mood normal.         Behavior: Behavior normal.         Thought Content: Thought content normal.         Judgment: Judgment normal.         The ROS, past medical history, " surgical history, family history, social history and vitals were reviewed by myself and corrected as needed.      Labs/Imaging:  -Core needle biopsy of the right lung performed 6/1/2022 demonstrates benign lung parenchyma with no evidence of carcinoma.  -PET/CT performed 6/6/2022, personally reviewed, demonstrates a spiculated right upper lobe lung mass with an SUV of 8.0.  No hypermetabolic mediastinal or hilar lymphadenopathy is present.  A hypermetabolic nodule or lymph node measuring 2 cm x 0.8 cm is present along the dorsal margin of the left parotid gland.  -CT of the chest performed 5/4/2022, personally reviewed, demonstrates a 3.6 x 2.5 cm spiculated right middle lobe lung mass.  A 4 mm nodule is present in the right lower lobe that is stable in appearance.    Assessment/Plan:  62-year-old  male with a history of hyperlipidemia, stroke, active tobacco abuse and Charcot-Jolene-Tooth disease who presents with a right lung mass.  I discussed pinky the differential with the patient including malignancy versus infection.  Although his needle biopsy was negative for malignancy, imaging findings are concerning for malignancy in the setting of his tobacco abuse.  I discussed with the patient performing a bronchoscopy, right VATS, wedge resection with possible lobectomy, mediastinal lymph node dissection and intercostal nerve blocks.  The risks and benefits of surgery were discussed with the patient including pain, bleeding, infection, air leak, myocardial infarction and death.  The patient understood these risks and wished to proceed with surgery.  Full pulmonary function tests will be obtained to evaluate his parenchymal reserve.  Cardiac clearance will be obtained from his cardiologist Dr. Foster.     Patient Active Problem List   Diagnosis   • Chronic obstructive pulmonary disease (HCC)   • Eczema   • Hypercholesterolemia   • Hyperthyroidism   • Hypothyroidism   • Nausea   • NETTIE on CPAP   • Hereditary  sensorimotor neuropathy   • Cerebrovascular accident (HCC)   • Vitamin D deficiency   • Benign hypertension   • Chest pain syndrome   • Tobacco abuse   • Seasonal allergic rhinitis   • Osteoarthritis   • Gastroesophageal reflux disease with hiatal hernia   • Essential tremor   • Charcot-Jolene-Tooth disease   • Obesity   • Sick sinus syndrome (HCC)   • Skin lesion of chest wall   • Dyslipidemia   • Abnormal electrocardiogram    • RML Lung mass   • Weight loss

## 2022-06-14 NOTE — TELEPHONE ENCOUNTER
Patient needs to be scheduled for Bronch VATS with lobectomy with Dr. Durant.     Pt's LOV 1/18/22, called pt to get updates since LOV. Pt did not answer, LVM for return call.       What is patient's cardiac risk assessment?          Ok to leave note in Epic.   F# [phone]

## 2022-06-16 NOTE — PROGRESS NOTES
"[facility] Pulmonary Follow up      Chief Complaint  Lung Nodule and Follow-up    Subjective          Terry Mcguire presents to [facility] for follow-up after his visit with Dr. Taylor and Dr. Durant.  He has a right lung lesion that is hypermetabolic consistent with malignancy, he plans on undergoing VATS with wedge resection.    Today he would like to discuss his history of sleep apnea.  He does have obstructive sleep apnea and was seeing a provider in [place] but he is not sure of their name.  He has not been wearing his PAP therapy secondary to the pressures being too high and uncomfortable.  He received his machine and his supplies through [facility] [employer] [facility] in [place].  He was wearing a fullface mask.    At this time he would like to try to start wearing his CPAP again.  He does notice that he is sleep quality is improved when he was wearing his CPAP.        Objective     Vital Signs:   /80   Pulse 94   Temp 97.5 °F (36.4 °C)   Ht 167.6 cm (66\")   Wt 79.6 kg (175 lb 6.4 oz)   SpO2 99% Comment: resting, room air  BMI 28.31 kg/m²       Immunization History   Administered Date(s) Administered   • Flu Vaccine Quad PF >36MO 10/30/2017   • Fluzone Split Quad (Multi-dose) 10/12/2016   • Pneumococcal Polysaccharide (PPSV23) 10/12/2016       Physical Exam  Vitals reviewed.   Constitutional:       Appearance: He is well-developed.   HENT:      Head: Normocephalic and atraumatic.   Eyes:      Pupils: Pupils are equal, round, and reactive to light.   Cardiovascular:      Rate and Rhythm: Normal rate and regular rhythm.      Heart sounds: No murmur heard.  Pulmonary:      Effort: Pulmonary effort is normal. No respiratory distress.      Breath sounds: Normal breath sounds. No wheezing or rales.   Abdominal:      General: Bowel sounds are normal. There is no distension.      Palpations: Abdomen is soft.   Musculoskeletal:         General: Normal range of " motion.      Cervical back: Normal range of motion and neck supple.   Skin:     General: Skin is warm and dry.      Findings: No erythema.   Neurological:      Mental Status: He is alert and oriented to person, place, and time.   Psychiatric:         Behavior: Behavior normal.          Result Review :                         Assessment and Plan    Problem List Items Addressed This Visit        Pulmonary and Pneumonias    Chronic obstructive pulmonary disease (HCC)    RML Lung mass       Sleep    NETTIE on CPAP - Primary    Overview     Currently not tolerating CPAP                 He does have a plan to follow-up with wedge resection with Dr. Durant for his hypermetabolic right lung mass.  That is yet to be scheduled but is pending.    Today we did discuss his sleep apnea as well as his CPAP use.  He would like to resume his CPAP.  We will get a most recent download from [employer], he is states he has not worn his CPAP for several months now.  He is not sure where he got his sleep study possibly in [place], we will see if we can find the original sleep study as well to get a better idea on his sleep apnea.    I would like to follow-up on an overnight oximetry to see if he is having nocturnal hypoxemia as well.    He will follow-up via telemedicine visit since he lives over an hour away, once we get his CPAP information and we can get him reestablished on PAP therapy.    Follow Up     No follow-ups on file.  Patient was given instructions and counseling regarding his condition or for health maintenance advice. Please see specific information pulled into the AVS if appropriate.     I spent 35 minutes caring for Terry on this date of service. This time includes time spent by me in the following activities:preparing for the visit, reviewing tests, obtaining and/or reviewing a separately obtained history, performing a medically appropriate examination and/or evaluation , counseling and educating the  patient/family/caregiver, ordering medications, tests, or procedures, referring and communicating with other health care professionals  and documenting information in the medical record      HAILE Chacon, ACNP  [facility]  Electronically signed

## 2022-06-20 NOTE — PROGRESS NOTES
I did receive his CPAP download from July to October of last year.  He is on an auto CPAP 8-20.  At the time of that download he was using it about 5-1/2 hours but his AHI was elevated at 12.5 with quite a bit of leak.    I was not able to find his original sleep study.  I would like for him to restart his CPAP with a fullface mask as well as a follow-up in a download in 30 days.

## 2022-07-20 NOTE — TELEPHONE ENCOUNTER
Returned phone call from patient regarding CPAP supply order. Pt had originally called stating that he had not received supplies.    Original order was sent to [facility] in [place]. However , after further conversation with DME, patient had an outstanding balance therefore they would not be able to fill order.    We re-sent order to a different DME on 7/24/2022. Patient still had not received supplies on 7/20 and called clinic.    I called [facility] to check on status of order. They stated they had not received order, so I sent order again on 7/20 . I attempted to call pt to inform him of this but there was no answer and voicemail had not been set up. Will attempt to call again later.

## 2022-08-17 NOTE — PAT
An arrival time for procedure was not given during PAT visit. If patient had any questions or concerns about their arrival time, they were instructed to contact their surgeon/physician.  Additionally, if the patient referred to an arrival time that was acquired from their my chart account, patient was encouraged to verify that time with their surgeon/physician.  NO arrival times given in Pre Admission Testing Department.    Patient to apply Chlorhexadine wipes  to surgical area (as instructed) the night before procedure and the AM of procedure. Wipes provided.    Patient viewed general PAT education video as instructed in their preoperative information received from their surgeon.  Patient stated the general PAT education video was viewed in its entirety and survey completed.  Copies of PAT general education handouts (Incentive Spirometry, Meds to Beds Program, Patient Belongings, Pre-op skin preparation instructions, Blood Glucose testing, Visitor policy, Surgery FAQ, Code H) distributed to patient if not printed. Education related to the PAT pass and skin preparation for surgery (if applicable) completed in PAT as a reinforcement to PAT education video. Patient instructed to return PAT pass provided today as well as completed skin preparation sheet (if applicable) on the day of procedure.     Additionally if patient had not viewed video yet but intended to view it at home or in our waiting area, then referred them to the handout with QR code/link provided during PAT visit.  Instructed patient to complete survey after viewing the video in its entirety.  Encouraged patient/family to read PAT general education handouts thoroughly and notify PAT staff with any questions or concerns. Patient verbalized understanding of all information and priority content.    Patient directed to Radiology Department for CXR after Pre Admission Testing Appointment.     [facility] INTERROGATION ON CHART AND IN MEDIA FROM 7/20/22.   BATTERY 6 MOS. LEFT.    CARDIAC RISK MILD-MODERATE WITH CONTINUED TOBACCO USE PER DR. CALDERON ON 6/14/22.

## 2022-08-18 PROBLEM — N18.30 CKD (CHRONIC KIDNEY DISEASE) STAGE 3, GFR 30-59 ML/MIN (HCC): Chronic | Status: ACTIVE | Noted: 2022-01-01

## 2022-08-18 PROBLEM — R91.1 LUNG NODULE: Status: ACTIVE | Noted: 2022-01-01

## 2022-08-18 PROBLEM — E78.5 DYSLIPIDEMIA: Chronic | Status: ACTIVE | Noted: 2022-01-01

## 2022-08-18 PROBLEM — N18.30 CKD (CHRONIC KIDNEY DISEASE) STAGE 3, GFR 30-59 ML/MIN (HCC): Status: ACTIVE | Noted: 2022-01-01

## 2022-08-18 PROBLEM — E78.5 DYSLIPIDEMIA: Status: ACTIVE | Noted: 2022-01-01

## 2022-08-18 NOTE — ANESTHESIA POSTPROCEDURE EVALUATION
Patient: Jack AlmonteOklaunion    Procedure Summary     Date: 08/18/22 [facility] / [facility]:  [facility]    Anesthesia Start: 1456 Anesthesia Stop: 1929    Procedures:       BRONCHOSCOPY (N/A Bronchus)      THORACOSCOPY VIDEO ASSISTED WITH RIGHT MIDDLE LOBECTOMY, MEDIASTINAL LYMPH NODE DISSECTION. INTERCOSTAL NERVE BLOCKS (Right Chest) Diagnosis:       Lung mass      (Lung mass [R91.8])    Surgeons: Stanton Durant MD Provider: Alexander Gresham MD    Anesthesia Type: general ASA Status: 3          Anesthesia Type: general    Vitals  No vitals data found for the desired time range.          Post Anesthesia Care and Evaluation    Patient location during evaluation: PACU  Patient participation: complete - patient participated  Level of consciousness: awake and alert  Pain management: adequate    Airway patency: patent  Anesthetic complications: No anesthetic complications  PONV Status: none  Cardiovascular status: hemodynamically stable and acceptable  Respiratory status: nonlabored ventilation, acceptable and nasal cannula  Hydration status: acceptable

## 2022-08-18 NOTE — H&P
Pre-Op H&P    Patient Name: Jack Mcguire  : 1960  MRN: [id number]    Chief complaint: Lung mass    HPI:    Patient is a 62 y.o.male with history of tobacco use who presents with right lung mass. Patient anticipates a bronchoscopy with right video assisted thoracoscopy with right upper wedge resection, possible lobectomy, mediastinal lymph node dissection today with Dr. Durant. Needle biopsy negative for malignancy, but imaging concerning with history of tobacco abuse. No changes in symptoms or medical history since last office visit on 2022.     Denies chest pain or SOA today. Cardiac clearance obtained with Dr. Foster. Mild-moderate cardiovascular risk.        Review of Systems:  General ROS: negative for chills, fever or skin lesions;  No changes since last office visit.  Neg for recent sick exposure  Cardiovascular ROS: no chest pain or dyspnea on exertion  Respiratory ROS: no cough, shortness of breath, or wheezing    Allergies: No Known Allergies    Home Meds:    No current facility-administered medications on file prior to encounter.     Current Outpatient Medications on File Prior to Encounter   Medication Sig Dispense Refill   • allopurinol (Zyloprim) 100 MG tablet Take 1 tablet by mouth Daily. 90 tablet 3   • levocetirizine (XYZAL) 5 MG tablet Take 5 mg by mouth Daily.  0   • lovastatin (MEVACOR) 20 MG tablet TAKE 1 TABLET EVERY NIGHT (Patient taking differently: Take 20 mg by mouth Every Night.) 90 tablet 3   • Morphine (MS CONTIN) 30 MG 12 hr tablet Take 30 mg by mouth 2 (Two) Times a Day.  0   • omeprazole (priLOSEC) 40 MG capsule Take 1 capsule by mouth Daily. 90 capsule 3   • oxyCODONE (ROXICODONE) 10 MG tablet Take 10 mg by mouth 2 (Two) Times a Day.     • tamsulosin (FLOMAX) 0.4 MG capsule 24 hr capsule Take 0.4 mg by mouth Daily.     • tiZANidine (ZANAFLEX) 4 MG tablet Take 1 tablet by mouth Every 6 (Six) Hours As Needed for Muscle Spasms. 120 tablet 11   • aspirin 81 MG tablet Take  "81 mg by mouth Daily.     • naproxen (NAPROSYN) 375 MG tablet Take 375 mg by mouth 2 (Two) Times a Day As Needed for Mild Pain .         PMH:   Past Medical History:   Diagnosis Date   • Benign hypertension 09/28/2016   • Bradycardia 09/28/2016   • Charcot-Jolene disease    • Chest pain syndrome 09/28/2016   • COPD (chronic obstructive pulmonary disease) ([facility])    • Essential tremor 09/28/2016   • Gastroesophageal reflux disease with hiatal hernia 09/28/2016   • Hypercholesterolemia    • Hypothyroidism    • Lung mass     RIGHT   • Obesity 09/28/2016   • NETTIE on CPAP    • Osteoarthritis 09/28/2016   • Seasonal allergic rhinitis 09/28/2016   • Sick sinus syndrome ([facility]) 09/28/2016   • Stroke ([facility]) 1999    left side peripheral vision loss     PSH:    Past Surgical History:   Procedure Laterality Date   • ANKLE SURGERY Left    • CARDIAC CATHETERIZATION      NO INTERVENTION   • CARPAL TUNNEL RELEASE Bilateral    • COLONOSCOPY      multiple   • HERNIA REPAIR Right     inguinal   • INSERT / REPLACE / REMOVE PACEMAKER  2006    Have had it replaced once   • OTHER SURGICAL HISTORY Bilateral     Elbow surgery   • PACEMAKER IMPLANTATION      [facility]; LAST INTERROGATION 7/20/22   • TONSILLECTOMY     • TOOTH EXTRACTION         Immunization History:  Tetanus: Unknown  COVID: Not vaccinated    Social History:   Tobacco:   Social History     Tobacco Use   Smoking Status Current Every Day Smoker   • Packs/day: 0.50   • Years: 46.00   • Pack years: 23.00   • Types: Cigarettes   Smokeless Tobacco Never Used      Alcohol:     Social History     Substance and Sexual Activity   Alcohol Use Yes    Comment: once a year       Vitals:           /92 (BP Location: Right arm, Patient Position: Lying)   Pulse 72   Temp 97 °F (36.1 °C) (Temporal)   Resp 18   Ht 167.6 cm (66\")   Wt 82 kg (180 lb 12.4 oz)   SpO2 97%   BMI 29.18 kg/m²     Physical Exam:  General Appearance:    Alert, cooperative, no distress, appears stated age   Head:    " Normocephalic, without obvious abnormality, atraumatic   Lungs:     Clear to auscultation bilaterally, respirations unlabored    Heart:   Regular rate and rhythm, S1 and S2 normal, no murmur, rub    or gallop    Abdomen:    Soft, nontender.  +bowel sounds   Breast Exam:    deferred   Genitalia:    deferred   Extremities:   Extremities normal, atraumatic, no cyanosis or edema   Skin:   Skin color, texture, turgor normal, no rashes or lesions   Neurologic:   Grossly intact   Results Review  LABS:  Lab Results   Component Value Date    WBC 7.36 08/17/2022    HGB 14.4 08/17/2022    HCT 43.3 08/17/2022    MCV 91.9 08/17/2022     08/17/2022    NEUTROABS 4.80 08/17/2022    GLUCOSE 118 (H) 08/17/2022    BUN 22 08/17/2022    CREATININE 1.30 (H) 08/17/2022    EGFRIFNONA 53 (L) 12/31/2020    EGFRIFAFRI 64 12/31/2020     08/17/2022    K 4.5 08/17/2022     08/17/2022    CO2 27.0 08/17/2022    CALCIUM 9.1 08/17/2022    ALBUMIN 4.30 08/17/2022    AST 19 08/17/2022    ALT 9 08/17/2022    BILITOT 0.2 08/17/2022    INR 0.93 08/17/2022       RADIOLOGY:  Chest X-Ray PA & Lateral    Result Date: 8/17/2022  DATE OF EXAM: 8/17/2022 12:58 PM  PROCEDURE: XR CHEST PA AND LATERAL-  INDICATIONS: Pre-Op Cardiac Surgery; R91.1-Solitary pulmonary nodule  COMPARISON: 6/1/2022  TECHNIQUE: Two radiologic views of the chest, PA and lateral, were obtained.  FINDINGS: Some minimal focal opacity is present in the right midlung field, similar to comparison and correlating to the upper lung mass noted on recent PET. There is no effusion, pneumothorax or additional focal opacity. Left chest wall ICD projects unchanged.      Some minimal focal opacity is present in the right midlung field, similar to comparison and correlating to the upper lung mass noted on recent PET. There is no effusion, pneumothorax or additional focal opacity. Left chest wall ICD projects unchanged.  This report was finalized on 8/17/2022 1:50 PM by Ras  Chato.         I reviewed the patient's new clinical results.        Impression:  Right lung mass    Plan:   Bronchoscopy  Right video assisted thoracoscopy with right upper wedge resection, possible lobectomy, mediastinal lymph node dissection        Electronically signed by Lou Cole PA-C   08/18/22   1:04 PM EDT

## 2022-08-18 NOTE — ANESTHESIA PROCEDURE NOTES
Airway  Urgency: elective    Date/Time: 8/18/2022 3:09 PM  Airway not difficult    General Information and Staff    Patient location during procedure: OR  SRNA: Donavon Blakely SRNA  Indications and Patient Condition  Indications for airway management: airway protection    Preoxygenated: yes  MILS not maintained throughout  Mask difficulty assessment: 1 - vent by mask    Final Airway Details  Final airway type: endotracheal airway      Successful airway: EBT - double lumen left  Cuffed: yes   Successful intubation technique: video laryngoscopy  Endotracheal tube insertion site: oral  Blade: Enamorado  Blade size: 3  EBT DL size (fr): 39  Cormack-Lehane Classification: grade I - full view of glottis  Placement verified by: bronchoscopy, capnometry and single lung ventilation   Tracheal cuff pressure (cm H2O): 6  Bronchial cuff pressure (cm H2O): 2  Measured from: lips  ETT/EBT  to lips (cm): 29  Number of attempts at approach: 1  Assessment: lips, teeth, and gum same as pre-op

## 2022-08-18 NOTE — ANESTHESIA PREPROCEDURE EVALUATION
Anesthesia Evaluation     Patient summary reviewed and Nursing notes reviewed                Airway   Mallampati: II  TM distance: >3 FB  Neck ROM: full  No difficulty expected  Dental - normal exam   (+) upper dentures and lower dentures    Pulmonary - normal exam   (+) a smoker, COPD, sleep apnea on CPAP,   Cardiovascular - normal exam    (+) pacemaker (d/t SSS) pacemaker interrogated <1 month ago, hypertension, hyperlipidemia,     ROS comment:   Stress test 3/18: normal EF, no ischemia/low risk study    Cardiac status acceptable; mild-mod CV risk per cardiology    Neuro/Psych  (+) CVA (vision defecit on left),    GI/Hepatic/Renal/Endo    (+)  GERD,  thyroid problem hypothyroidism    Musculoskeletal (-) negative ROS    Abdominal  - normal exam    Bowel sounds: normal.   Substance History - negative use     OB/GYN negative ob/gyn ROS         Other                      Anesthesia Plan    ASA 3     general     (DLETT)  intravenous induction     Anesthetic plan, risks, benefits, and alternatives have been provided, discussed and informed consent has been obtained with: patient.    Plan discussed with CRNA.        CODE STATUS:

## 2022-08-19 NOTE — OP NOTE
DATE OF PROCEDURE: 8/18/2022     PREOPERATIVE DIAGNOSES:  1. Right middle lobe lung nodule  2. Active tobacco abuse     POSTOPERATIVE DIAGNOSES:    1. Right middle lobe lung nodule  2. Active tobacco abuse     PROCEDURES PERFORMED:    1. Bronchoscopy  2. Right video assisted thoracoscopic surgery  3. Right middle lobectomy  4. Mediastinal lymph node dissection  5. Intercostal nerve blocks with cryoablation and local    SURGEON: Stanton Durant MD       ASSISTANTS:    1. Pierre Dumont PA was responsible for performing the following activities: Retraction, Closing, Placing Dressing and Held/Positioned Camera and their skilled assistance was necessary for the success of this case.    Circulator: India Rocha RN; Nora Mehta RN  Scrub Person: Daija Duong  Nursing Assistant: Cherise Cooley PCT      ANESTHESIA: General endotracheal anesthesia with Jon Gonzalez CRNA     ESTIMATED BLOOD LOSS: 100 mL.       INDICATIONS:  62-year-old  male with a history of hyperlipidemia, stroke, active tobacco abuse and Charcot-Jolene-Tooth disease who presented with a right lung mass.   The patient was felt to be a reasonable candidate for bronchoscopy, right VATS, wedge resection with possible lobectomy, mediastinal lymph node dissection and intercostal nerve blocks.  This was tentatively felt to represent a [id number] Clinical stage IB and surgery is of curative intent.  The risks and benefits of surgery were discussed with the patient including pain, bleeding, infection, air leak, myocardial infarction and death. The patient understood these risks and wished to proceed with surgery.      DESCRIPTION OF PROCEDURE:  The patient was taken to the operating room and placed under general endotracheal anesthesia.  A double-lumen endotracheal tube was placed and position verified with the pediatric bronchoscope.  Examination of the bilateral bronchial tree down to the level of the subsegmental bronchi did not reveal any  evidence of endobronchial mass or blood.  There were minimal secretions.  The patient was placed in the left lateral decubitus position and all 4 extremities were padded and secured to prevent neurologic injury.  He was prepped and draped in the usual sterile fashion and a timeout was performed including the patient's name, procedure and antibiotic administration.  An incision was made at the seventh intercostal space in the midaxillary line.  Additional incisions were made posteriorly at the seventh intercostal space and an anterior incision at the level one interspace below the superior pulmonary vein.  The known right lung mass was present in the right middle lobe.  This was fairly close to the fissure and right upper lobe.  I was concerned about violating the tumor and compromising margins with a wedge resection.  Decision was made to proceed with right middle lobectomy.  The inferior pulmonary ligament was divided up to the inferior pulmonary vein.  Lymph nodes were identified within the ligament and sent for permanent pathology.  The middle lobe branch to the superior pulmonary vein was encircled and divided using a vascular stapler.  The middle lobe bronchus was encircled and a test clamp applied with a 45 black staple load.  Test insufflation revealed satisfactory ventilation of the upper and lower lobes.  The bronchus was divided.  The arterial branches to the middle lobes were identified, encircled and divided using a vascular staple load.  The remaining fissure was divided with additional purple staple loads.  The lobe was then externalized using a 15 mm Endo Catch bag and sent for margins.  Lymph nodes were harvested from stations 2R, 4R, 7, 9 and 11.  All involved interspaces were anesthetized using cryoablation intercostal nerve blocks within the chest under direct vision.  Intercostal nerves from T3 to T7 were cryoablated down to negative sixty degrees for two minutes each using the Cryosphere.  The  intercostal spaces were infiltrated with local anesthetic within the chest under direct vision.  The skin incisions were also infiltrated with local anesthetic.  The chest was then irrigated with warm water and test insufflation did not reveal any evidence of air leak at the bronchus.  The tissue below the parenchymal staple line had a trivial leak at the intersection between the two fissures.  Two 28 Ukrainian channel drains were placed anterior and posterior to the hilum.  These were secured using 0 silk suture.  A 0 Ethibond suture was placed in a mattress fashion for later thoracostomy site closure.  The lung was then inflated under direct vision and found to satisfactorily occupy the chest.  The anterior incision was closed with a running 2-0 Vicryl suture followed by 3-0 Vicryl dermal layer and 4-0 Monocryl subcuticular stitch.  Overlying skin glue was applied to this incision and gauze and tape to the chest tube sites.  The patient was returned to the supine position, extubated in the operating room and transported to recovery in stable condition.

## 2022-08-19 NOTE — BRIEF OP NOTE
BRONCHOSCOPY, THORACOSCOPY VIDEO ASSISTED POSSIBLE THORACOTOMY  Progress Note    Jack Maynor  8/18/2022    Pre-op Diagnosis:   Lung mass [R91.8]       Post-Op Diagnosis Codes:     * Lung mass [R91.8]    Procedure/CPT® Codes:    Procedure(s):  BRONCHOSCOPY  THORACOSCOPY VIDEO ASSISTED WITH RIGHT MIDDLE LOBECTOMY, MEDIASTINAL LYMPH NODE DISSECTION. INTERCOSTAL NERVE BLOCKS    Surgeon(s):  Stanton Durant MD    Anesthesia: General    Staff:   Circulator: India Rocha RN; Nora Mehta RN  Scrub Person: Daija Duong  Nursing Assistant: Cherise Cooley PCT  Assistant: Pierre Dumont PA  Assistant: Pierre Dumont PA      Estimated Blood Loss: 100 mL    Urine Voided: * No values recorded between 8/18/2022  2:56 PM and 8/18/2022  7:26 PM *    Specimens:                          Drains:   Urethral Catheter Silicone 16 Fr. (Active)   Daily Indications Selected surgeries ( tract, abdomen) 08/18/22 2100   Site Assessment Clean;Skin intact 08/18/22 2100   Collection Container Standard drainage bag 08/18/22 2100   Securement Method Securing device 08/18/22 2100   Catheter care complete Yes 08/18/22 2100   Output (mL) 30 mL 08/18/22 2100       Y Chest Tube 1 and 2 1 Right Pleural 28 Fr. 2 Right Pleural 28 Fr. (Active)   Function -20 cm H2O 08/18/22 2100   Patency Intervention Tip/tilt 08/18/22 2100   Left Subcutaneous Emphysema none present 08/18/22 2100   Right Subcutaneous Emphysema none present 08/18/22 2100   Safety all connections secured;suction checked 08/18/22 2100   Drainage Description 1 Sanguineous 08/18/22 2100   Air Leak/Fluctuation 1 fluctuation present;air leak not present 08/18/22 2100   Dressing Type 1 Gauze;Border Dressing 08/18/22 2100   Dressing Status 1 Dry;Intact 08/18/22 2100   Site Assessment 1 Not assessed 08/18/22 2100   Surrounding Skin 1 Unable to view 08/18/22 2100   Securement 1 tubing anchored to body distal to insertion site with tape 08/18/22 2100   Drainage Description 2  Sanguineous 08/18/22 2045   Air Leak/Fluctuation 2 air leak not present;fluctuation present;dependent drainage cleared 08/18/22 2100   Dressing Type 2 Gauze;Border Dressing 08/18/22 2100   Dressing Status 2 Intact;Dry 08/18/22 2100   Site Assessment 2 Not assessed 08/18/22 2100   Surrounding Skin 2 Unable to view 08/18/22 2100   Output (mL) 50 mL 08/18/22 2100       Findings: NSCLC, margins clear    Synoptic portion:    Intent:  curative    Surgical procedure performed:  Resection of at least one lobe or bilobectomy - Lobectomy WITH mediastinal lymph node dissection    Mediastinal lymph node stations resected:  2R Upper Paratracheal (right), 4R Lower Paratracheal (right), 7 Subcarinal and 9 Pulmonary ligament    Hilar lymph node station(s) resected:  11 Interlobar      Complications: None    Assistant: Pierre Dumont PA  was responsible for performing the following activities: Retraction, Closing, Placing Dressing and Held/Positioned Camera and their skilled assistance was necessary for the success of this case.    Stanton Durant MD     Date: 8/18/2022  Time: 21:19 EDT

## 2022-08-19 NOTE — PROGRESS NOTES
Intensive Care Admission Note     Lung mass  Status post VATS right middle lobe lobectomy with lymph node sampling and followed in the ICU for medical needs including COPD and obstructive sleep apnea in addition to a baseline chronic renal insufficiency.    History of Present Illness     Jack Mcguire is a 62 y.o. male w/ a RUL mass that was initially negative on needle biopsy but was concerning on imaging 2/t his extensive tobacco history.  Patient had cardiac clearance per Dr. Foster and went to the OR w/ Dr. Durant on 8/18/22 where he had a RML VATS w/ lobectomy & mediastinal lymph node dissection.  Surgical findings were indicative of NSCLC.  EBL ~ 100 ml.     He has been brought to the intensive care unit for postoperative care and monitoring.  The patient is arousable.  He states that he is having pain at his incision sites but otherwise is breathing without much difficulty.  Hemodynamics remained stable.  I was able to discuss his care with his wife at the bedside.       Problem List, Surgical History, Family, Social History, and ROS     RML Lung mass, status post VATS right middle lobe lobectomy, prelim pathology non-small cell carcinoma    COPD    Hypothyroidism    NETTIE on CPAP    H/O CVA    HTN    GERD    SSS s/p PM    Dyslipidemia    Stage III CKD    Lung nodule    Past Surgical History:   Procedure Laterality Date   • ANKLE SURGERY Left    • CARDIAC CATHETERIZATION      NO INTERVENTION   • CARPAL TUNNEL RELEASE Bilateral    • COLONOSCOPY      multiple   • HERNIA REPAIR Right     inguinal   • INSERT / REPLACE / REMOVE PACEMAKER  2006    Have had it replaced once   • OTHER SURGICAL HISTORY Bilateral     Elbow surgery   • PACEMAKER IMPLANTATION      [facility]; LAST INTERROGATION 7/20/22   • TONSILLECTOMY     • TOOTH EXTRACTION         No Known Allergies  No current facility-administered medications on file prior to encounter.     Current Outpatient Medications on File Prior to Encounter   Medication Sig  "  • allopurinol (Zyloprim) 100 MG tablet Take 1 tablet by mouth Daily.   • levocetirizine (XYZAL) 5 MG tablet Take 5 mg by mouth Daily.   • lovastatin (MEVACOR) 20 MG tablet TAKE 1 TABLET EVERY NIGHT (Patient taking differently: Take 20 mg by mouth Every Night.)   • Morphine (MS CONTIN) 30 MG 12 hr tablet Take 30 mg by mouth 2 (Two) Times a Day.   • omeprazole (priLOSEC) 40 MG capsule Take 1 capsule by mouth Daily.   • oxyCODONE (ROXICODONE) 10 MG tablet Take 10 mg by mouth 2 (Two) Times a Day.   • tamsulosin (FLOMAX) 0.4 MG capsule 24 hr capsule Take 0.4 mg by mouth Daily.   • tiZANidine (ZANAFLEX) 4 MG tablet Take 1 tablet by mouth Every 6 (Six) Hours As Needed for Muscle Spasms.   • aspirin 81 MG tablet Take 81 mg by mouth Daily.   • naproxen (NAPROSYN) 375 MG tablet Take 375 mg by mouth 2 (Two) Times a Day As Needed for Mild Pain .     MEDICATION LIST AND ALLERGIES REVIEWED.    Family History   Problem Relation Age of Onset   • Cancer Mother    • Charcot-Jolene-Tooth disease Mother    • Pneumonia Father    • Lung cancer Father    • Heart attack Neg Hx      Social History     Tobacco Use   • Smoking status: Current Every Day Smoker     Packs/day: 0.50     Years: 46.00     Pack years: 23.00     Types: Cigarettes   • Smokeless tobacco: Never Used   Vaping Use   • Vaping Use: Never used   Substance Use Topics   • Alcohol use: Yes     Comment: once a year   • Drug use: No     Social History     Social History Narrative    Ongoing tobacco abuse    Has worked as a philip and small engine repair     FAMILY AND SOCIAL HISTORY REVIEWED.    ROS:  See H&P  ALL OTHER SYSTEMS REVIEWED AND ARE NEGATIVE.    Physical Exam and Clinical Information   BP 99/63 (BP Location: Left arm, Patient Position: Lying)   Pulse 70   Temp 97.9 °F (36.6 °C) (Axillary)   Resp 16   Ht 167.6 cm (66\")   Wt 82 kg (180 lb 12.4 oz)   SpO2 95%   BMI 29.18 kg/m²   Physical Exam  Constitutional:       General: He is not in acute distress.     " Appearance: He is not ill-appearing or diaphoretic.   HENT:      Head: Normocephalic.      Nose: Nose normal.      Mouth/Throat:      Mouth: Mucous membranes are moist.   Eyes:      Extraocular Movements: Extraocular movements intact.      Pupils: Pupils are equal, round, and reactive to light.   Neck:      Vascular: No carotid bruit.   Cardiovascular:      Rate and Rhythm: Normal rate and regular rhythm.      Pulses: Normal pulses.      Heart sounds: Normal heart sounds.      Comments: Pacemaker over left precordium.  Pulmonary:      Effort: Pulmonary effort is normal.      Comments: Mild pleural rub on the right.  Chest tubes in place on the right.  No air leak currently.  Abdominal:      General: Abdomen is flat. Bowel sounds are normal. There is no distension.      Tenderness: There is no abdominal tenderness.   Musculoskeletal:         General: No swelling, tenderness or deformity. Normal range of motion.      Cervical back: Normal range of motion and neck supple. No rigidity or tenderness.   Skin:     General: Skin is warm.      Capillary Refill: Capillary refill takes less than 2 seconds.   Neurological:      General: No focal deficit present.      Mental Status: He is alert and oriented to person, place, and time.   Psychiatric:         Mood and Affect: Mood normal.         Results from last 7 days   Lab Units 08/17/22  1157   WBC 10*3/mm3 7.36   HEMOGLOBIN g/dL 14.4   PLATELETS 10*3/mm3 219     Results from last 7 days   Lab Units 08/17/22  1157   SODIUM mmol/L 136   POTASSIUM mmol/L 4.5   CO2 mmol/L 27.0   BUN mg/dL 22   CREATININE mg/dL 1.30*   GLUCOSE mg/dL 118*     Estimated Creatinine Clearance: 59.3 mL/min (A) (by C-G formula based on SCr of 1.3 mg/dL (H)).  Results from last 7 days   Lab Units 08/17/22  1157   HEMOGLOBIN A1C % 5.70*         No results found for: LACTATE     Images:     I reviewed the patient's results and images.     Impression     Medical Problems             Hospital Problem List      * (Principal) RML Lung mass    COPD (Chronic)    Hypothyroidism (Chronic)    Overview Deleted 8/18/2022  2:54 PM by Debra Mcfarland DNP, APRN            NETTIE on CPAP (Chronic)    Overview Addendum 6/16/2022 12:42 PM by Amelia Wilson, APRN     Currently not tolerating CPAP         H/O CVA (Chronic)    Overview Signed 8/18/2022  3:05 PM by Debra Mcfarland DNP, APRN     · 1999, with residual left-sided peripheral vision loss.          HTN (Chronic)    GERD (Chronic)    SSS s/p PM (Chronic)    Dyslipidemia (Chronic)    Stage III CKD (Chronic)                  Plan/Recommendations     - ICU admit for close observation  - post-operative care per primary  - Avoid all nephrotoxins.  - continue home medications including PPI & synthroid  -We will await staging pathology for further plan and recommendations.  - BD & prn  - nocturnal NIPPV    Terry Lozano MD, [facility]  Pulmonary and Critical Care Medicine  08/18/22 22:12 EDT         CC: Segundo Vo MD

## 2022-08-19 NOTE — PROGRESS NOTES
INTENSIVIST   PROGRESS NOTE        S     Jack 62 y.o. male is followed for: No chief complaint on file.       R91.8, RML mass, S/P Lobectomy 08/18/2022    COPD    Hypothyroidism    NETTIE on CPAP    H/O CVA    HTN    GERD    SSS s/p PM    Dyslipidemia    Stage III CKD    Lung nodule    As an Intensivist, we provide an integrated approach to the ICU patient and family, medical management of comorbid conditions, including but not limited to electrolytes, glycemic control, organ dysfunction, lead interdisciplinary rounds and coordinate the care with all other services, including those from other specialists.     Interval History:  POD: 1 Day Post-Op    No acute events overnight.     (+) Air Leak.  On ambient air.  Soreness surgical site.    No pressors/drips.    The patient qualifies to receive the vaccine, but they have not yet received it.     Temp  Min: 96.7 °F (35.9 °C)  Max: 98.6 °F (37 °C)       History     Last Reviewed by Dex Gerber MD on 8/19/2022 at  7:43 AM    Sections Reviewed    Medical, Family, Surgical, Tobacco, Alcohol, Drug Use, Sexual Activity,   Social Documentation    Problem list reviewed by Dex Gerber MD on 8/19/2022 at  7:42 AM  Medicines reviewed by Dex Gerber MD on 8/19/2022 at  7:42 AM  Allergies reviewed by Dex Gerber MD on 8/19/2022 at  7:42 AM       The patient's relevant past medical, surgical and social history were reviewed and updated in Epic as appropriate.        O     Vitals:  Temp: 98.6 °F (37 °C) (08/19/22 0400) Temp  Min: 96.7 °F (35.9 °C)  Max: 98.6 °F (37 °C)   Temp core:      BP: 140/89 (08/19/22 0630) BP  Min: 90/76  Max: 167/92   Pulse: 72 (08/19/22 0724) Pulse  Min: 69  Max: 89   Resp: 18 (08/19/22 0724) Resp  Min: 14  Max: 20   SpO2: 94 % (08/19/22 0724) SpO2  Min: 89 %  Max: 100 %   Device: humidified, nasal cannula (08/19/22 0724)    Flow Rate: 2.5 (08/19/22 0724) Flow (L/min)  Min: 2  Max: 4         08/18/22  1246   Weight: 82 kg (180 lb 12.4 oz)         Intake/Ouptut 24 hrs (7:00AM - 6:59 AM)  Intake & Output (last 3 days)       08/16 0701 08/17 0700 08/17 0701 08/18 0700 08/18 0701 08/19 0700 08/19 0701 08/20 0700    I.V. (mL/kg)   1247 (15.2)     IV Piggyback   100     Total Intake(mL/kg)   1347 (16.4)     Urine (mL/kg/hr)   965     Blood   100     Chest Tube   280     Total Output   1345     Net   +2                   Medications (drips):  niCARdipine, Last Rate: Stopped (08/19/22 0201)      Physical Examination  Telemetry:  Rhythm: paced rhythm (08/19/22 0600)         Constitutional:  No acute distress.   Cardiovascular: RRR.   Normal heart sounds.  No murmurs, gallop or rub.   Respiratory: Normal breath sounds  No adventitious sounds.   Abdominal:  Soft with no tenderness.  No distension.   No HSM.   Extremities: Warm.  Dry.  No cyanosis.  No Edema   Neurological:   Alert, Oriented, Cooperative.  Best Eye Response: 4-->(E4) spontaneous (08/19/22 0600)  Best Motor Response: 6-->(M6) obeys commands (08/19/22 0600)  Best Verbal Response: 5-->(V5) oriented (08/19/22 0600)  [place] Coma Scale Score: 15 (08/19/22 0600)     Results Reviewed:  Laboratory  Microbiology  Radiology  Pathology    Hematology:  Results from last 7 days   Lab Units 08/19/22  0303 08/17/22  1157   WBC 10*3/mm3 17.56* 7.36   HEMOGLOBIN g/dL 13.2 14.4   MCV fL 89.1 91.9   PLATELETS 10*3/mm3 193 219   NEUTROS ABS 10*3/mm3 16.46* 4.80   LYMPHS ABS 10*3/mm3 0.38* 1.57   EOS ABS 10*3/mm3 0.00 0.25       Chemistry:  Estimated Creatinine Clearance: 67.6 mL/min (by C-G formula based on SCr of 1.14 mg/dL).    Results from last 7 days   Lab Units 08/19/22  0332 08/17/22  1157   SODIUM mmol/L 134* 136   POTASSIUM mmol/L 4.3 4.5   CHLORIDE mmol/L 102 100   CO2 mmol/L 19.0* 27.0   BUN mg/dL 23 22   CREATININE mg/dL 1.14 1.30*   GLUCOSE mg/dL 153* 118*     Results from last 7 days   Lab Units 08/19/22  0332 08/17/22  1157   CALCIUM mg/dL 8.6 9.1   MAGNESIUM mg/dL 1.8  --    PHOSPHORUS mg/dL 3.2  --       COVID-19  Lab Results   Component Value Date    COVID19 Not Detected 2022     Images:  XR Chest 1 View    Result Date: 2022  Expected postoperative changes of right thoracotomy. No evidence of pneumothorax.  This report was finalized on 2022 8:45 PM by Dr. Corby Salomon MD.      Chest X-Ray PA & Lateral    Result Date: 2022  Some minimal focal opacity is present in the right midlung field, similar to comparison and correlating to the upper lung mass noted on recent PET. There is no effusion, pneumothorax or additional focal opacity. Left chest wall ICD projects unchanged.  This report was finalized on 2022 1:50 PM by Ras Reis.      Results: Reviewed.  I reviewed the patient's new laboratory and imaging results.  I independently reviewed the patient's new images.    Medications: Reviewed.    Assessment    A / P     Hospital:  LOS: 1 day   ICU: 10h      Diagnosis   • **R91.8, RML mass, S/P Lobectomy 2022 [R91.8]   • Dyslipidemia [E78.5]   • Stage III CKD [N18.30]   • GERD [K21.9, K44.9]   • SSS s/p PM [I49.5]   • HTN [I10]   • COPD [J44.9]   • NETTIE on CPAP [G47.33, Z99.89]   • Hypothyroidism [E03.9]   • H/O CVA [I63.9]     Jack is a 62 y.o. male admitted on 2022 with Lung mass [R91.8]  Lung nodule [R91.1]:    Assessment/Management/Treatment Plan:    1. RML nodule - Preliminary path suggestive of NSCLC  Procedure(s) (LRB):  BRONCHOSCOPY (N/A)  THORACOSCOPY VIDEO ASSISTED WITH RIGHT MIDDLE LOBECTOMY, MEDIASTINAL LYMPH NODE DISSECTION, INTERCOSTAL NERVE BLOCKS (Right)  Dr. Stanton Durant (Cardiothoracic Surgery)   22     Tobacco use  COPD no exacerbation    2. Cardiovascular  a. Dyslipidemia   b. HTN  c. SSS s/p PPM  3. Sleep  a. NETTIE ? Treatment with CPAP  4. GI/Hepatology  a. GERD  5. Renal  a. CKD Stage 3  6. Endocrine  a. Hypothryoridism ? Treatment with Levothyroxine  b. At risk for DM (pre-diabetes) based on his HbA1c. [HbA1C 5.7%-6.4%, eA-139 mg/dL].          Lab Results   Lab Value Date/Time    HGBA1C 5.70 (H) 08/17/2022 1157    HGBA1C 5.7 (H) 04/25/2022 1626       Diet: Diet Regular; Cardiac   Advance Directives: Code Status and Medical Interventions:   Ordered at: 08/18/22 1933     Code Status (Patient has no pulse and is not breathing):    CPR (Attempt to Resuscitate)     Medical Interventions (Patient has pulse or is breathing):    Full Support        In brief:  1. Follow final path and staging.  2. CPAP hs and prn  3. Disposition: Transfer to Telemetry Unit    Plan of care and goals reviewed during interdisciplinary rounds.  I discussed the patient's findings and my recommendations with patient and nursing staff    Level of Risk is High due to:  illness with threat to life or bodily function.     OK to floor.    Hospitalist Team will assist with medical management, and follow as Consultant, once on the Floor.    Thank you.    Time: 25 minutes, in direct patient care, with the patient and/or on the kirby coordinating care with other health care providers. (This is noncurrent time).    I have spent > 50% percent of this time, counseling and discussing management.     []  Primary Attending Intensive Care Medicine - Nutrition Support   [x]  Consultant    Pending Labs     Order Current Status    Tissue Pathology Exam In process

## 2022-08-19 NOTE — PLAN OF CARE
Goal Outcome Evaluation:  Plan of Care Reviewed With: patient, spouse        Progress: improving  Outcome Evaluation: neuro intact. 2L NC. MT 1/2 220mL. UOP 290mL, improving UOP throughout shift. WBC elevated 17.56. replacing mag. BP fluctuating during shift, started Cardene for BP parameters but patient did not tolerate. episodes of N/V, zofran no relief. Called APRN, KUB ordered and compazine given.

## 2022-08-19 NOTE — PROGRESS NOTES
Cardiothoracic Surgery Progress Note      POD # 1 s/p VATS RML lobectomy     LOS: 1 day      Subjective:  Up in chair, no acute events overnight.  Some incisional pain    Objective:  Vital Signs  Temp:  [96.7 °F (35.9 °C)-98.6 °F (37 °C)] 98.6 °F (37 °C)  Heart Rate:  [69-89] 71  Resp:  [14-20] 18  BP: ()/() 140/89    Physical Exam:   General Appearance: alert, appears stated age and cooperative   Lungs: clear to auscultation, respirations regular, respirations even and respirations unlabored   Heart: regular rhythm & normal rate, normal S1, S2, no murmur, no gallop, no rub and no click   Skin: Incision c/d/i   CT no air leak with cough  Output by Drain (mL) 08/18/22 0701 - 08/18/22 1900 08/18/22 1901 - 08/19/22 0700 08/19/22 0701 - 08/19/22 0710 Range Total   Y Chest Tube 1 and 2 1 Right Pleural 28 Fr. 2 Right Pleural 28 Fr.  280  280        Results:  Results from last 7 days   Lab Units 08/19/22  0303   WBC 10*3/mm3 17.56*   HEMOGLOBIN g/dL 13.2   HEMATOCRIT % 38.6   PLATELETS 10*3/mm3 193     Results from last 7 days   Lab Units 08/19/22  0332   SODIUM mmol/L 134*   POTASSIUM mmol/L 4.3   CHLORIDE mmol/L 102   CO2 mmol/L 19.0*   BUN mg/dL 23   CREATININE mg/dL 1.14   GLUCOSE mg/dL 153*   CALCIUM mg/dL 8.6       Assessment:  POD # 1 s/p VATS RML lobectomy    Plan:  Continue chest tubes to suction  D/C IV fluids  D/C hayden  Transfer to telemetry  Ambulate  Pulmonary toilet  Await final pathology    Stanton Durant MD  08/19/22  07:09 EDT

## 2022-08-19 NOTE — CASE MANAGEMENT/SOCIAL WORK
Discharge Planning Assessment  [facility]     Patient Name: Jack Mcguire  MRN: [id number]  Today's Date: 8/19/2022    Admit Date: 8/18/2022     Discharge Needs Assessment     Row Name 08/19/22 1042       Living Environment    People in Home spouse    Current Living Arrangements home    Primary Care Provided by self    Able to Return to Prior Arrangements yes               Discharge Plan     Row Name 08/19/22 1043       Plan    Plan Ongoing    Plan Comments Spoke with patient at bedside regarding home situation.  Mr. Mcguire reports he lives with his wife in their own home in [place] [facility] [place].  States that she will be available to assist with care, meals and housekeeping after discharge.  Patient reports he was independent of all ADL's and IADL's prior to admission and has home oxygen through [employer] that he wears 2L/NC at night.  PCP is Segundo Vo and patient is able to obtain his prescription medicaitons.  Family will provide transport home.  Case Management will follow and assist with any discharge planning needs.    Final Discharge Disposition Code 01 - home or self-care              Continued Care and Services - Admitted Since 8/18/2022    Coordination has not been started for this encounter.          Demographic Summary     Row Name 08/19/22 1042       General Information    Admission Type inpatient    Referral Source admission list    Reason for Consult discharge planning    Preferred Language English               Functional Status     Row Name 08/19/22 1042       Functional Status    Usual Activity Tolerance good       Functional Status, IADL    Medications independent    Housekeeping independent               Psychosocial    No documentation.                Abuse/Neglect    No documentation.                Legal    No documentation.                Substance Abuse    No documentation.                Patient Forms    No documentation.                   Jeanette Rios RN

## 2022-08-19 NOTE — PLAN OF CARE
Goal Outcome Evaluation:  Plan of Care Reviewed With: patient        Progress: no change  Outcome Evaluation: Pt transferred out of ICU this AM around 1000. Pt c/o pain, but refuses PRN pain meds. RN educated pt about pain meds. CT dsg changed this AM. Pt refusing to ambulate this AM, but able to ambulate this afternoon. Pt voided since removing f/c. Pt tolerating RA this shift. VSS. RN will continue to monitor.

## 2022-08-20 NOTE — PROGRESS NOTES
[facility] Medicine Services  PROGRESS NOTE    Patient Name: Jack Mcguire  : 1960  MRN: [id number]    Date of Admission: 2022  Primary Care Physician: Segundo Vo MD    Subjective   Subjective     CC:  Lung mass    HPI:  Significant pain this morning at sight of chest tube. Worse with deep breaths  Refused stool softeners as he worried about incontinence given limited mobility.    ROS:  Gen- No fevers, chills  CV- No chest pain, palpitations  Resp- No cough, dyspnea  GI- No N/V/D, abd pain    Objective   Objective     Vital Signs:   Temp:  [98.1 °F (36.7 °C)-99.1 °F (37.3 °C)] 98.5 °F (36.9 °C)  Heart Rate:  [71-86] 81  Resp:  [18-24] 20  BP: (112-171)/(70-94) 129/71  Flow (L/min):  [1] 1     Physical Exam:  Constitutional: Older male sitting up in chair, appears in pain  HENT: NCAT, mucous membranes moist  Respiratory: Clear to auscultation bilaterally, respiratory effort normal but taking shallow breaths on exam  Chest: chest tube in place with serosanguinous drainage  Cardiovascular: RRR, no murmurs, rubs, or gallops  Gastrointestinal: Soft, nontender, nondistended  Musculoskeletal: Muscle tone within normal limits, no joint effusions appreciated  Psychiatric: Appropriate affect, cooperative  Neurologic: Alert and oriented, facial movements symmetric and spontaneous movement of all 4 extremities grossly equal bilaterally, speech clear. Significant tremors appreciated in bilateral hands (chronic per patient)  Skin: No rashes      Results Reviewed: ICU notes reviewed, wbc improved today, Hbg stable and Glc well-controlled    LAB RESULTS:      Lab 22  0732 22  0303 22  1157   WBC 12.42* 17.56* 7.36   HEMOGLOBIN 13.1 13.2 14.4   HEMATOCRIT 41.4 38.6 43.3   PLATELETS 187 193 219   NEUTROS ABS 9.48* 16.46* 4.80   IMMATURE GRANS (ABS) 0.05 0.07* 0.02   LYMPHS ABS 1.62 0.38* 1.57   MONOS ABS 1.12* 0.61 0.64   EOS ABS 0.08 0.00 0.25   MCV 93.0 89.1 91.9    PROTIME  --   --  12.3         Lab 08/20/22  0732 08/19/22  0332 08/17/22  1157   SODIUM 141 134* 136   POTASSIUM 3.5 4.3 4.5   CHLORIDE 107 102 100   CO2 24.0 19.0* 27.0   ANION GAP 10.0 13.0 9.0   BUN 18 23 22   CREATININE 1.09 1.14 1.30*   EGFR 76.7 72.7 62.1   GLUCOSE 108* 153* 118*   CALCIUM 8.8 8.6 9.1   MAGNESIUM 2.1 1.8  --    PHOSPHORUS  --  3.2  --    HEMOGLOBIN A1C  --   --  5.70*         Lab 08/17/22  1157   TOTAL PROTEIN 6.9   ALBUMIN 4.30   ALT (SGPT) 9   AST (SGOT) 19   BILIRUBIN 0.2   BILIRUBIN DIRECT <0.2   ALK PHOS 102         Lab 08/17/22  1157   PROTIME 12.3   INR 0.93             Lab 08/18/22  1238 08/17/22  1157   ABO TYPING O O   RH TYPING Positive Positive   ANTIBODY SCREEN  --  Negative         Brief Urine Lab Results     None          Microbiology Results Abnormal     None          XR Chest 1 View    Result Date: 8/20/2022  DATE OF EXAM: 8/20/2022 1:11 AM  PROCEDURE: XR CHEST 1 VW-  INDICATIONS: s/p RMLobectomy; R91.1-Solitary pulmonary nodule; R91.8-Other nonspecific abnormal finding of lung field  COMPARISON: One day prior  TECHNIQUE: Single radiographic AP view of the chest was obtained.  FINDINGS: Right-sided pleural drains appear to have been removed, with some persisting linear opacity likely representing volume loss along the drain tract. There is no distinct pneumothorax. Lung volumes are diminished from comparison, with increased atelectasis. There is no significant pleural effusion. Left chest wall ICD projects in place.      Impression: Right-sided pleural drains appear to have been removed, with some persisting linear opacity likely representing volume loss along the drain tract. There is no distinct pneumothorax. Lung volumes are diminished from comparison, with increased atelectasis. There is no significant pleural effusion. Left chest wall ICD projects in place.  This report was finalized on 8/20/2022 6:05 AM by Ras Reis.      XR Chest 1 View    Result Date:  8/19/2022   DATE OF EXAM: 8/19/2022 4:08 AM  PROCEDURE: XR CHEST 1 VW-  INDICATIONS: postop; R91.1-Solitary pulmonary nodule; R91.8-Other nonspecific abnormal finding of lung field  COMPARISON: 8/18/2022 at 7:39 PM  TECHNIQUE: [Portable chest radiograph]  FINDINGS: The right chest tubes are stable in position. There is no evidence for significant pneumothorax. Residual atelectasis is noted within bilateral lung bases. The cardiac silhouette and mediastinum are stable. A left cardiac pacemaker/AICD is noted with leads intact.      Impression: 1.  Stable positioning of the right chest tubes. 2.  Mild residual atelectasis within the lung bases. 3.  No evidence for pneumothorax.  This report was finalized on 8/19/2022 9:06 AM by Tom Reed MD.      XR Chest 1 View    Result Date: 8/18/2022  DATE OF EXAM: 8/18/2022 7:36 PM  PROCEDURE: XR CHEST 1 VW-  INDICATIONS: postop; R91.1-Solitary pulmonary nodule; R91.8-Other nonspecific abnormal finding of lung field  COMPARISON: 8/17/2022  TECHNIQUE: Single radiographic AP view of the chest was obtained.  FINDINGS: Image is timed 7:39 PM 8/18/2022. Right thoracotomy tubes are seen with their tips toward the medial right apex. There is trace right midlung discoid atelectasis, and stable mild chronic-appearing left lung changes. No pneumothorax effusion or significant new pulmonary parenchymal disease is seen elsewhere. Heart and vasculature appear normal in size. Left-sided dual-lead pacemaker is again noted.      Impression: Expected postoperative changes of right thoracotomy. No evidence of pneumothorax.  This report was finalized on 8/18/2022 8:45 PM by Dr. Corby Salomon MD.      XR Abdomen KUB    Result Date: 8/19/2022  DATE OF EXAM: 8/19/2022 6:08 AM  PROCEDURE: XR ABDOMEN KUB-  INDICATIONS: n/v; R91.1-Solitary pulmonary nodule; R91.8-Other nonspecific abnormal finding of lung field  COMPARISON: No comparisons available.  TECHNIQUE: Single radiographic view of the abdomen  was obtained.  FINDINGS: There is mild-to-moderate fecal loading of the colon. The bowel gas pattern is otherwise nonobstructive. There is no overt pneumoperitoneum. Lumbar spondylosis and mild dextrocurvature is present.      Impression: There is mild-to-moderate fecal loading of the colon. The bowel gas pattern is otherwise nonobstructive. There is no overt pneumoperitoneum. Lumbar spondylosis and mild dextrocurvature is present.  This report was finalized on 8/19/2022 9:13 AM by Ras Reis.            I have reviewed the medications:  Scheduled Meds:acetaminophen, 1,000 mg, Oral, Q8H  allopurinol, 100 mg, Oral, Daily  aspirin, 81 mg, Oral, Daily  atorvastatin, 10 mg, Oral, Nightly  docusate sodium, 200 mg, Oral, BID  gabapentin, 100 mg, Oral, TID  heparin (porcine), 5,000 Units, Subcutaneous, Q12H  ipratropium-albuterol, 3 mL, Nebulization, 4x Daily - RT  levothyroxine, 100 mcg, Oral, Daily  Morphine, 30 mg, Oral, BID  oxyCODONE, 10 mg, Oral, BID  pantoprazole, 40 mg, Oral, QAM  polyethylene glycol, 17 g, Oral, Daily  senna-docusate sodium, 2 tablet, Oral, Nightly  sodium chloride, 10 mL, Intravenous, Q12H  sodium chloride, 10 mL, Intravenous, Q8H  tamsulosin, 0.4 mg, Oral, Daily      Continuous Infusions:niCARdipine, 5-15 mg/hr, Last Rate: Stopped (08/20/22 0728)      PRN Meds:.bisacodyl  •  bisacodyl  •  docusate sodium  •  ipratropium-albuterol  •  magnesium hydroxide  •  Morphine  •  ondansetron **OR** ondansetron  •  senna-docusate sodium  •  tiZANidine    Assessment & Plan   Assessment & Plan     Active Hospital Problems    Diagnosis  POA   • **R91.8, RML mass, S/P Lobectomy 08/18/2022 [R91.8]  Yes   • Dyslipidemia [E78.5]  Yes   • Stage III CKD [N18.30]  Yes   • Lung nodule [R91.1]  Yes   • GERD [K21.9, K44.9]  Yes   • SSS s/p PM [I49.5]  Yes   • HTN [I10]  Yes   • COPD [J44.9]  Yes   • NETTIE on CPAP [G47.33, Z99.89]  Not Applicable   • Hypothyroidism [E03.9]  Yes   • H/O CVA [I63.9]  Yes      Resolved  Hospital Problems   No resolved problems to display.        Brief Hospital Course to date:  Jack Mcguire is a 62 y.o. male w h/o tobacco use who presented with right lung mass. Now s/p right middle lobectomy 8/18, prelim path consistent with NSCLC    1) RML nodule, prelim path NSCLC, now s/p right middle lobectomy 8/18 with Dr. 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 Appreciate opportunity to contribute to patient's care.    DVT prophylaxis:  Medical and mechanical DVT prophylaxis orders are present.     AM-PAC 6 Clicks Score (PT): 19 (08/20/22 5015)    CODE STATUS:   Code Status and Medical Interventions:   Ordered at: 08/18/22 1933     Code Status (Patient has no pulse and is not breathing):    CPR (Attempt to Resuscitate)     Medical Interventions (Patient has pulse or is breathing):    Full Support       Claudia Glynn MD  08/20/22

## 2022-08-20 NOTE — SIGNIFICANT NOTE
"Patient calls out stating that he has chest pain. Patient states that his chest pain started in his back on the right side and has radiated to his chest on his right side, with the most pain being at his chest tube insertion sites. EKG obtained, see results. Vitals obtained, see flowsheet. Pain meds given, see Evie MATOS paged REDDY Devries. REDDY Batres called back at 1637, states, \"hook patient's chest tubes back up to -20cm of suction, give pain meds as needed.\" RN restarted patient's chest tubes back on -20cm of suction, changed chest tube dressing again, making sure all connections were tight. After, patient states that he \"feels much better.\" VSS. RN will continue to monitor.    08/20/22 1553   Pain/Comfort/Sleep   Preferred Pain Scale number (Numeric Rating Pain Scale)   (0-10) Pain Rating: Rest 10   Pain Location chest   Pain Side/Orientation right   Pain Description stabbing   Nonverbal Indicators of Pain anxious;body stiff;grimace;guarding;restless   Pain Management Interventions see MAR   POSS (Pasero Opioid-Induced Sed Scale) 1 - Awake and alert   Sleep/Rest/Relaxation awake   Coping/Psychosocial   Observed Emotional State anxious;cooperative   Verbalized Emotional State anxiety   Trust Relationship/Rapport care explained;choices provided;emotional support provided;empathic listening provided;questions answered;questions encouraged;reassurance provided;thoughts/feelings acknowledged   Family/Support Persons spouse;family   Involvement in Care at bedside;interacting with patient   Diversional Activities television   Coping/Psychosocial Interventions   Supportive Measures active listening utilized   Cognitive   Cognitive/Neuro/Behavioral WDL X;motor response   Motor Response   Motor Response general motor response   General Motor Response tremors   Respiratory   Respiratory WDL X;breath sounds;rhythm/pattern;effort/expansion   Rhythm/Pattern, Respiratory shallow;depth regular;labored   Expansion/Accessory " Muscles/Retractions abdominal muscle use   Breath Sounds   Breath Sounds All Fields;LLL;RLL   All Lung Fields Breath Sounds Anterior:;Lateral:;clear;equal bilaterally   LLL Breath Sounds diminished   RLL Breath Sounds diminished   Oxygen Therapy   Device (Oxygen Therapy) room air   ECG   Lead Monitored Lead II   Rhythm normal sinus rhythm   Frequency/Ectopy frequent;PVCs

## 2022-08-20 NOTE — PROGRESS NOTES
Cardiothoracic Surgery Progress Note      POD # 2 s/p VATS RML lobectomy     LOS: 2 days      Subjective:  Up in chair, no acute events overnight.  Some incisional pain    Objective:  Vital Signs  Temp:  [98.1 °F (36.7 °C)-99.1 °F (37.3 °C)] 98.1 °F (36.7 °C)  Heart Rate:  [70-82] 72  Resp:  [16-18] 18  BP: (137-171)/(70-94) 147/70    Physical Exam:   General Appearance: alert, appears stated age and cooperative   Lungs: clear to auscultation, respirations regular, respirations even and respirations unlabored   Heart: regular rhythm & normal rate, normal S1, S2, no murmur, no gallop, no rub and no click   Skin: Incision c/d/i   CT no air leak with cough  Output by Drain (mL) 08/19/22 0701 - 08/19/22 1900 08/19/22 1901 - 08/20/22 0700 08/20/22 0701 - 08/20/22 1007 Range Total   Y Chest Tube 1 and 2 1 Right Pleural 28 Fr. 2 Right Pleural 28 Fr. 160 170  330        Results:    Results from last 7 days   Lab Units 08/20/22  0732   WBC 10*3/mm3 12.42*   HEMOGLOBIN g/dL 13.1   HEMATOCRIT % 41.4   PLATELETS 10*3/mm3 187     Results from last 7 days   Lab Units 08/20/22  0732   SODIUM mmol/L 141   POTASSIUM mmol/L 3.5   CHLORIDE mmol/L 107   CO2 mmol/L 24.0   BUN mg/dL 18   CREATININE mg/dL 1.09   GLUCOSE mg/dL 108*   CALCIUM mg/dL 8.8       Assessment:  POD # 2 s/p VATS RML lobectomy  330 mL of drainage in 24 hours  No airleak  Plan:  Chest tubes to waterseal  And x-ray ambulate  Pulm discontinue chest tubes in a.m.  Pulmonary toilet  Await final pathology    Chente Batres PA-C  08/20/22  10:07 EDT

## 2022-08-20 NOTE — PLAN OF CARE
Goal Outcome Evaluation:  Plan of Care Reviewed With: patient        Progress: no change  Outcome Evaluation: Pt c/o pain and taking PRN pain meds this shift. CT dsg changed this shift. Pt ambulating well in [place]. Pt had one dose of IV Bumex with great urine output. Pt tolerating RA this shift after IV Bumex given. CT to water seal per REDDY Devries. VSS. RN will continue to monitor.

## 2022-08-21 NOTE — PLAN OF CARE
Goal Outcome Evaluation:              Outcome Evaluation: Pt vss, 50-75% atrial paced, room air all day, ambulated in hallway, complained of sharp chest pain in rt side where chest tubes are releived with morphine prn, Great urine output with lasix , chest tubes still in place dressing changed 100 out.

## 2022-08-21 NOTE — PROGRESS NOTES
Cardiothoracic Surgery Progress Note      POD # 3 s/p VATS RML lobectomy     LOS: 3 days      Subjective:  Up in chair, no acute events overnight.  Some incisional pain    Objective:  Vital Signs  Temp:  [98 °F (36.7 °C)-98.5 °F (36.9 °C)] 98 °F (36.7 °C)  Heart Rate:  [] 95  Resp:  [18-24] 18  BP: (129-162)/(71-94) 136/84    Physical Exam:   General Appearance: alert, appears stated age and cooperative   Lungs: clear to auscultation, respirations regular, respirations even and respirations unlabored   Heart: regular rhythm & normal rate, normal S1, S2, no murmur, no gallop, no rub and no click   Skin: Incision c/d/i   CT no air leak with cough  Output by Drain (mL) 08/20/22 0701 - 08/20/22 1900 08/20/22 1901 - 08/21/22 0700 08/21/22 0701 - 08/21/22 1447 Range Total   Y Chest Tube 1 and 2 1 Right Pleural 28 Fr. 2 Right Pleural 28 Fr. 110 110  220        Results:    Results from last 7 days   Lab Units 08/20/22  0732   WBC 10*3/mm3 12.42*   HEMOGLOBIN g/dL 13.1   HEMATOCRIT % 41.4   PLATELETS 10*3/mm3 187     Results from last 7 days   Lab Units 08/20/22  0732   SODIUM mmol/L 141   POTASSIUM mmol/L 3.5   CHLORIDE mmol/L 107   CO2 mmol/L 24.0   BUN mg/dL 18   CREATININE mg/dL 1.09   GLUCOSE mg/dL 108*   CALCIUM mg/dL 8.8       Assessment:  POD # 3 s/p VATS RML lobectomy  250 mL of drainage mL of drainage in 24 hours  No airleak  Plan:  Chest tubes to waterseal  And x-ray ambulate  Possibly discontinue chest tubes in a.m.  Pulmonary toilet  Await final pathology    Chente Batres PA-C  08/21/22  14:47 EDT

## 2022-08-21 NOTE — PROGRESS NOTES
[facility] Medicine Services  PROGRESS NOTE    Patient Name: Jack Mcguire  : 1960  MRN: [id number]    Date of Admission: 2022  Primary Care Physician: Segundo Vo MD    Subjective   Subjective     CC:  Lung mass    HPI:  Pt sitting up in the chair eating breakfast. He report pain at the chest tube site. He has not had a BM. Encouraged the patient to take the stool softeners. Will add dulcolax supp today.      ROS:  Gen- No fevers, chills  CV- No chest pain, palpitations  Resp- No cough, dyspnea  GI- No N/V/D, abd pain  MS- chest wall pain at the chest tube site, worse with deep breathing    Objective   Objective     Vital Signs:   Temp:  [98 °F (36.7 °C)-98.5 °F (36.9 °C)] 98 °F (36.7 °C)  Heart Rate:  [71-92] 77  Resp:  [18-24] 18  BP: (112-162)/(70-94) 136/84  Flow (L/min):  [1-2] 2     Physical Exam:  Constitutional: Awake, alert, NAD  HENT: NCAT, mucous membranes moist  Respiratory: Clear with diminished sounds in the bases, nonlabored respirations. Chest tube to right chest wall.    Cardiovascular: RRR, no murmurs, rubs, or gallop  Gastrointestinal: Positive bowel sounds, soft, nontender, nondistended  Musculoskeletal: No bilateral ankle edema  Psychiatric: Appropriate affect, cooperative  Neurologic: Oriented x 3, bilateral hand tremors, speech clear  Skin: No rashes    LAB RESULTS:      Lab 22  0732 22  0303 22  1157   WBC 12.42* 17.56* 7.36   HEMOGLOBIN 13.1 13.2 14.4   HEMATOCRIT 41.4 38.6 43.3   PLATELETS 187 193 219   NEUTROS ABS 9.48* 16.46* 4.80   IMMATURE GRANS (ABS) 0.05 0.07* 0.02   LYMPHS ABS 1.62 0.38* 1.57   MONOS ABS 1.12* 0.61 0.64   EOS ABS 0.08 0.00 0.25   MCV 93.0 89.1 91.9   PROTIME  --   --  12.3         Lab 22  0732 22  0332 22  1157   SODIUM 141 134* 136   POTASSIUM 3.5 4.3 4.5   CHLORIDE 107 102 100   CO2 24.0 19.0* 27.0   ANION GAP 10.0 13.0 9.0   BUN 18 23 22   CREATININE 1.09 1.14 1.30*   EGFR 76.7 72.7  62.1   GLUCOSE 108* 153* 118*   CALCIUM 8.8 8.6 9.1   MAGNESIUM 2.1 1.8  --    PHOSPHORUS  --  3.2  --    HEMOGLOBIN A1C  --   --  5.70*         Lab 08/17/22  1157   TOTAL PROTEIN 6.9   ALBUMIN 4.30   ALT (SGPT) 9   AST (SGOT) 19   BILIRUBIN 0.2   BILIRUBIN DIRECT <0.2   ALK PHOS 102         Lab 08/17/22  1157   PROTIME 12.3   INR 0.93             Lab 08/18/22  1238 08/17/22  1157   ABO TYPING O O   RH TYPING Positive Positive   ANTIBODY SCREEN  --  Negative         Brief Urine Lab Results     None          Microbiology Results Abnormal     None          XR Chest 1 View    Result Date: 8/20/2022  DATE OF EXAM: 8/20/2022 1:11 AM  PROCEDURE: XR CHEST 1 VW-  INDICATIONS: s/p RMLobectomy; R91.1-Solitary pulmonary nodule; R91.8-Other nonspecific abnormal finding of lung field  COMPARISON: One day prior  TECHNIQUE: Single radiographic AP view of the chest was obtained.  FINDINGS: Right-sided pleural drains appear to have been removed, with some persisting linear opacity likely representing volume loss along the drain tract. There is no distinct pneumothorax. Lung volumes are diminished from comparison, with increased atelectasis. There is no significant pleural effusion. Left chest wall ICD projects in place.      Impression: Right-sided pleural drains appear to have been removed, with some persisting linear opacity likely representing volume loss along the drain tract. There is no distinct pneumothorax. Lung volumes are diminished from comparison, with increased atelectasis. There is no significant pleural effusion. Left chest wall ICD projects in place.  This report was finalized on 8/20/2022 6:05 AM by Ras Reis.            I have reviewed the medications:  Scheduled Meds:acetaminophen, 1,000 mg, Oral, Q8H  allopurinol, 100 mg, Oral, Daily  aspirin, 81 mg, Oral, Daily  atorvastatin, 10 mg, Oral, Nightly  docusate sodium, 200 mg, Oral, BID  gabapentin, 100 mg, Oral, TID  heparin (porcine), 5,000 Units,  Subcutaneous, Q12H  ipratropium-albuterol, 3 mL, Nebulization, 4x Daily - RT  levothyroxine, 100 mcg, Oral, Daily  Morphine, 30 mg, Oral, BID  oxyCODONE, 10 mg, Oral, BID  pantoprazole, 40 mg, Oral, QAM  polyethylene glycol, 17 g, Oral, Daily  senna-docusate sodium, 2 tablet, Oral, Nightly  sodium chloride, 10 mL, Intravenous, Q12H  sodium chloride, 10 mL, Intravenous, Q8H  tamsulosin, 0.4 mg, Oral, Daily      Continuous Infusions:niCARdipine, 5-15 mg/hr, Last Rate: Stopped (08/20/22 0728)      PRN Meds:.bisacodyl  •  bisacodyl  •  docusate sodium  •  ipratropium-albuterol  •  magnesium hydroxide  •  Morphine  •  ondansetron **OR** ondansetron  •  senna-docusate sodium  •  tiZANidine    Assessment & Plan   Assessment & Plan     Active Hospital Problems    Diagnosis  POA   • **R91.8, RML mass, S/P Lobectomy 08/18/2022 [R91.8]  Yes   • Dyslipidemia [E78.5]  Yes   • Stage III CKD [N18.30]  Yes   • Lung nodule [R91.1]  Yes   • GERD [K21.9, K44.9]  Yes   • SSS s/p PM [I49.5]  Yes   • HTN [I10]  Yes   • COPD [J44.9]  Yes   • NETTIE on CPAP [G47.33, Z99.89]  Not Applicable   • Hypothyroidism [E03.9]  Yes   • H/O CVA [I63.9]  Yes      Resolved Hospital Problems   No resolved problems to display.        Brief Hospital Course to date:  Jack Mcguire is a 62 y.o. male w h/o tobacco use who presented with right lung mass. Now s/p right middle lobectomy 8/18, prelim path consistent with NSCLC.     1) RML nodule, prelim path NSCLC, now s/p right middle lobectomy 8/18 with Dr. Durant  -Chest tube management per primary    2) Post-operative constipation - discussed with patient's complications of ileus/severe constipation and encouraged bowel regimen use. 08/21 added Dulcolax suppository.    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 Stool softeners, supp today.     HLD  HTN  SSS s/p PPM  NETTIE - on cpap  GERD  CKDII  Hypothyroidism  Pre-DM - Glc here  within goals    Hospital medicine is available for any additional concerns per primary team. Appreciate opportunity to contribute to patient's care.    DVT prophylaxis:  Medical and mechanical DVT prophylaxis orders are present.     AM-PAC 6 Clicks Score (PT): 19 (08/20/22 2038)    CODE STATUS:   Code Status and Medical Interventions:   Ordered at: 08/18/22 1933     Code Status (Patient has no pulse and is not breathing):    CPR (Attempt to Resuscitate)     Medical Interventions (Patient has pulse or is breathing):    Full Support       Cathy Taylor, APRN  08/21/22

## 2022-08-22 NOTE — CASE MANAGEMENT/SOCIAL WORK
Continued Stay Note  [facility]     Patient Name: Jack Mcguire  MRN: [id number]  Today's Date: 8/22/2022    Admit Date: 8/18/2022     Discharge Plan     Row Name 08/22/22 1520       Plan    Plan Home    Final Discharge Disposition Code 01 - home or self-care    Final Note Areli's brother came and transport him home, so a Lyft was not needed.    Row Name 08/22/22 1230       Plan    Plan Home    Patient/Family in Agreement with Plan yes    Plan Comments Mr. Mcguire will be discharged today after his chest tubes are removed. He has made multiple attempt to contact family for a ride home and no one has been able to accomodate him. Will arrange a LYFT at the time of discharge (approved with Genevieve BUTLER Director). Case management will continue to follow.    Final Discharge Disposition Code 01 - home or self-care               Discharge Codes    No documentation.               Expected Discharge Date and Time     Expected Discharge Date Expected Discharge Time    Aug 22, 2022             Martha Veronica RN

## 2022-08-22 NOTE — DISCHARGE SUMMARY
[facility] Cardiothoracic Surgery Discharge Summary    Name:  Jack Mcguire  MRN Number:  [id number]  Date of Admission: 8/18/2022  Date of Discharge:  8/22/2022    Referred By: Stanton Durant MD  PCP: Segundo Vo MD  IP Care Team:  Patient Care Team:  Segundo Vo MD as PCP - General (Internal Medicine)  Noam Foster MD as Consulting Physician (Cardiology)  John Lockett MD (Otolaryngology)  Amelia Wilson APRN as Nurse Practitioner (Pulmonary Disease)    Discharge Diagnosis:  Past Medical History:   Diagnosis Date   • Benign hypertension 09/28/2016   • Bradycardia 09/28/2016   • Charcot-Jolene disease    • Chest pain syndrome 09/28/2016   • COPD (chronic obstructive pulmonary disease) (HCC)    • Essential tremor 09/28/2016   • Gastroesophageal reflux disease with hiatal hernia 09/28/2016   • Hypercholesterolemia    • Hypothyroidism    • Lung mass     RIGHT   • Obesity 09/28/2016   • NETTIE on CPAP    • Osteoarthritis 09/28/2016   • Seasonal allergic rhinitis 09/28/2016   • Sick sinus syndrome (HCC) 09/28/2016   • Stage III CKD 8/18/2022   • Stroke ([facility]) 1999    left side peripheral vision loss     Active Hospital Problems    Diagnosis  POA   • **R91.8, RML mass, S/P Lobectomy 08/18/2022 [R91.8]  Yes   • Dyslipidemia [E78.5]  Yes   • Stage III CKD [N18.30]  Yes   • Lung nodule [R91.1]  Yes   • GERD [K21.9, K44.9]  Yes   • SSS s/p PM [I49.5]  Yes   • HTN [I10]  Yes   • COPD [J44.9]  Yes   • NETTIE on CPAP [G47.33, Z99.89]  Not Applicable   • Hypothyroidism [E03.9]  Yes   • H/O CVA [I63.9]  Yes      Resolved Hospital Problems   No resolved problems to display.     Lung mass [R91.8]  Lung nodule [R91.1]    Procedures Performed:  Procedure(s):  BRONCHOSCOPY  THORACOSCOPY VIDEO ASSISTED WITH RIGHT MIDDLE LOBECTOMY, MEDIASTINAL LYMPH NODE DISSECTION, INTERCOSTAL NERVE BLOCKS       Operative Pathology:    Pending    History of Present Illness:    62-year-old  male with a history of  hyperlipidemia, stroke, active tobacco abuse and Charcot-Jolene-Tooth disease who presents with a right lung mass.  Overall, the patient is doing well and is without complaints.  He denies cough, hemoptysis, lymphadenopathy, fevers or chills.  Around 2 months ago, the patient had an episode of right back pain radiating to the right anterior chest lasting 1 week with spontaneous resolution.  Over the past year and a half, the patient has lost approximately 40 pounds.  His only change has been converting from regular Mountain Dew to diet Mountain Dew.    Hospital Course:  Patient was admitted to [facility] on 8/18/2022 for scheduled bronchoscopy, right VATS with right middle lobectomy, and intercostal nerve blocks with cryoablation with Dr. Durant.  Patient was extubated without any complications and was sent to ICU for further observation.  POD 1: Chest tubes were left to suction. Park catheter was removed. Transfer to telemetry orders were placed.   POD 2-3: Transferred to telemetry floor. Chest tubes were placed to water seal. No acute events.   POD 4: Chest tubes were removed. Post pull chest x-ray revealed no pneumothorax. Patient was discharged home in stable condition.    Discharge Medications:     Discharge Medications      Changes to Medications      Instructions Start Date   levothyroxine 100 MCG tablet  Commonly known as: SYNTHROID, LEVOTHROID  What changed: See the new instructions.   TAKE 1 TABLET DAILY. PATIENT NEEDS FOLLOW UP APPT FOR FURTHER REFILLS.         Continue These Medications      Instructions Start Date   allopurinol 100 MG tablet  Commonly known as: Zyloprim   100 mg, Oral, Daily      aspirin 81 MG tablet   81 mg, Oral, Daily      docusate sodium 100 MG capsule  Commonly known as: COLACE   200 mg, Oral, 2 Times Daily      levocetirizine 5 MG tablet  Commonly known as: XYZAL   5 mg, Oral, Daily      lovastatin 20 MG tablet  Commonly known as: MEVACOR   TAKE 1 TABLET EVERY  NIGHT      Morphine 30 MG 12 hr tablet  Commonly known as: MS CONTIN   30 mg, Oral, 2 Times Daily      naproxen 375 MG tablet  Commonly known as: NAPROSYN   375 mg, Oral, 2 Times Daily PRN      omeprazole 40 MG capsule  Commonly known as: priLOSEC   40 mg, Oral, Daily      oxyCODONE 10 MG tablet  Commonly known as: ROXICODONE   10 mg, Oral, 2 Times Daily      tamsulosin 0.4 MG capsule 24 hr capsule  Commonly known as: FLOMAX   0.4 mg, Oral, Daily      tiZANidine 4 MG tablet  Commonly known as: ZANAFLEX   4 mg, Oral, Every 6 Hours PRN             Allergies:  No Known Allergies    Discharge Diet:  Diet Instructions     Diet: Consistent Carbohydrate, Cardiac      Discharge Diet:  Consistent Carbohydrate  Cardiac             Activity at Discharge:  Activity Instructions     Activity as Tolerated      Bathing Restrictions      Type of Restriction: Bathing    Bathing Restrictions: No Tub Bath    Driving Restrictions      Type of Restriction: Driving    Driving Restrictions: No Driving While Taking Narcotics    Lifting Restrictions      Type of Restriction: Lifting    Lifting Restrictions: Lifting Restriction (Indicate Limit)    Weight Limit (Pounds): 10    Length of Lifting Restriction: until seen at next follow-up appointment          Discharge Education:  Post-Op Education:  Patient was advised on responsible use of opioids in the post-surgical setting.  Patient was advised these medications are highly addictive.  Patient advised on proper disposal of unused opioid medication and was urged to discard any unused opioid medication.  Wound Care:  Patient educated regarding care of post-operative wounds.  Patient is to wash with plain soap and water and pat dry.  Patient is not to use salves on the incision sites.  Patient instructed regarding the signs and symptoms of infection and when to call the office with any concerns.    Follow up Appointments:   Future Appointments   Date Time Provider Department Center   9/14/2022   2:45 PM Segundo Vo MD [facility]   9/22/2022  1:45 PM Noam Foster MD [facility]     Additional Instructions for the Follow-ups that You Need to Schedule     Call MD With Problems / Concerns   As directed      Instructions:   Please call the CT Surgery office with any questions or concerns you may have [phone]    Order Comments: Instructions: Please call the CT Surgery office with any questions or concerns you may have [phone]          Discharge Follow-up with PCP   As directed       Currently Documented PCP:    Segundo Vo MD    PCP Phone Number:    [phone]     Follow Up Details: Follow-up with your PCP within 1-2 weeks         Discharge Follow-up with Specialty: Cardiothoracic Surgery   As directed      Specialty: Cardiothoracic Surgery    Follow Up Details: Follow-up within 4-6 weeks with in office CXR                 HAILE De Luna  08/22/22  14:11 EDT    Time: I spent 35 minutes on this discharge activity which included: face-to-face encounter with the patient, reviewing the data in the system, coordination of the care with the nursing staff as well as consultants, documentation, and entering orders.

## 2022-08-22 NOTE — PROGRESS NOTES
Cardiothoracic Surgery Progress Note      POD # 4 s/p VATS, RML lobectomy     LOS: 4 days      Subjective:  Incisional pain this morning. On 2L NC saturations 96%.     Objective:  Vital Signs  Temp:  [97.9 °F (36.6 °C)-99 °F (37.2 °C)] 97.9 °F (36.6 °C)  Heart Rate:  [] 77  Resp:  [14-20] 14  BP: ()/(56-90) 134/78    Physical Exam:   General Appearance: alert, appears stated age and cooperative   Lungs: clear to auscultation, respirations regular, respirations even and respirations unlabored   Heart: regular rhythm & normal rate, normal S1, S2, no murmur, no gallop, no rub and no click   Skin: Incision c/d/i   CT: 100 mL/24 hrs;  no air leak with cough  Output by Drain (mL) 08/21/22 0701 - 08/21/22 1900 08/21/22 1901 - 08/22/22 0700 08/22/22 0701 - 08/22/22 0851 Range Total   Y Chest Tube 1 and 2 1 Right Pleural 28 Fr. 2 Right Pleural 28 Fr. 100   100        Results:    Results from last 7 days   Lab Units 08/22/22  0543   WBC 10*3/mm3 8.07   HEMOGLOBIN g/dL 13.4   HEMATOCRIT % 40.7   PLATELETS 10*3/mm3 183     Results from last 7 days   Lab Units 08/22/22  0543   SODIUM mmol/L 139   POTASSIUM mmol/L 4.2   CHLORIDE mmol/L 100   CO2 mmol/L 29.0   BUN mg/dL 26*   CREATININE mg/dL 1.35*   GLUCOSE mg/dL 93   CALCIUM mg/dL 8.9       Assessment:  POD # 4 s/p VATS RML lobectomy    Plan:  D/C chest tubes and D/C home later today if postpull x-ray satisfactory  Wean oxygen as tolerated  Daily CXR  Pulmonary toilet  Await final pathology    Stanton Durant MD  08/22/22  08:51 EDT

## 2022-08-22 NOTE — CASE MANAGEMENT/SOCIAL WORK
Continued Stay Note  [facility]     Patient Name: Jack Mcguire  MRN: [id number]  Today's Date: 8/22/2022    Admit Date: 8/18/2022     Discharge Plan     Row Name 08/22/22 1230       Plan    Plan Home    Patient/Family in Agreement with Plan yes    Plan Comments Mr. Mcguire will be discharged today after his chest tubes are removed. He has made multiple attempt to contact family for a ride home and no one has been able to accommodate him. Will arrange a LYFT at the time of discharge (approved with Genevieve BUTLER Director). Case management will continue to follow.    Final Discharge Disposition Code 01 - home or self-care               Discharge Codes    No documentation.               Expected Discharge Date and Time     Expected Discharge Date Expected Discharge Time    Aug 24, 2022             Israel Monahan RN

## 2022-08-22 NOTE — OUTREACH NOTE
Prep Survey    Flowsheet Row Responses   [facility] facility patient discharged from? [place]   Is LACE score < 7 ? No   Emergency Room discharge w/ pulse ox? No   Eligibility [facility]   Date of Admission 08/18/22   Date of Discharge 08/22/22   Discharge Disposition Home or Self Care   Discharge diagnosis Lobectomy   Does the patient have one of the following disease processes/diagnoses(primary or secondary)? General Surgery   Does the patient have Home health ordered? No   Is there a DME ordered? No   Prep survey completed? Yes          KESHAWN A - Registered Nurse

## 2022-08-23 NOTE — OUTREACH NOTE
Call Center TCM Note    Flowsheet Row Responses   [facility] patient discharged from? [place]   Does the patient have one of the following disease processes/diagnoses(primary or secondary)? General Surgery   TCM attempt successful? Yes   Call start time 1327   Call end time 1331   Discharge diagnosis Lobectomy   Does the patient have all medications related to this admission filled (includes all antibiotics, pain medications, etc.) N/A   Is the patient taking all medications as directed (includes completed medication regime)? Yes   Does the patient have a follow up appointment scheduled with their surgeon? No   What is preventing the patient from scheduling follow up appointments? Waiting on return call   Nursing Interventions Educated patient on importance of making appointment, Advised patient to make appointment   Has the patient kept scheduled appointments due by today? N/A   Comments HOSP DC FU appt with PCP 8/31/22 @ 11 am   Has home health visited the patient within 72 hours of discharge? N/A   Psychosocial issues? No   Did the patient receive a copy of their discharge instructions? Yes   Nursing interventions Reviewed instructions with patient   What is the patient's perception of their health status since discharge? Improving   Nursing interventions Nurse provided patient education   Is the patient /caregiver able to teach back basic post-op care? Take showers only when approved by MD-sponge bathe until then, No tub bath, swimming, or hot tub until instructed by MD, Lifting as instructed by MD in discharge instructions, Drive as instructed by MD in discharge instructions   Is the patient/caregiver able to teach back signs and symptoms of incisional infection? Increased redness, swelling or pain at the incisonal site, Increased drainage or bleeding, Incisional warmth, Pus or odor from incision, Fever   Is the patient/caregiver able to teach back steps to recovery at home? Eat a well-balance diet,  Rest and rebuild strength, gradually increase activity, Set small, achievable goals for return to baseline health   Is the patient/caregiver able to teach back the hierarchy of who to call/visit for symptoms/problems? PCP, Specialist, Home health nurse, Urgent Care, ED, 911 Yes   TCM call completed? Yes   Wrap up additional comments Pt reports he is doing ok.           May Truong RN    8/23/2022, 13:31 EDT

## 2022-08-31 NOTE — OUTREACH NOTE
General Surgery Week 2 Survey    Flowsheet Row Responses   [facility] patient discharged from? [place]   Does the patient have one of the following disease processes/diagnoses(primary or secondary)? General Surgery   Week 2 attempt successful? No   Unsuccessful attempts Attempt 1          GRUPO LANDRY - Registered Nurse

## 2022-09-06 NOTE — OUTREACH NOTE
General Surgery Week 2 Survey    Flowsheet Row Responses   [facility] patient discharged from? [place]   Does the patient have one of the following disease processes/diagnoses(primary or secondary)? General Surgery   Week 2 attempt successful? Yes   Call start time 0946   Call end time 0953   Meds reviewed with patient/caregiver? Yes   Is the patient having any side effects they believe may be caused by any medication additions or changes? No   Does the patient have all medications related to this admission filled (includes all antibiotics, pain medications, etc.) N/A   Is the patient taking all medications as directed (includes completed medication regime)? Yes   Does the patient have a follow up appointment scheduled with their surgeon? Yes   Has the patient kept scheduled appointments due by today? Yes   Comments States has seen his PCP. States has appt with surgeon.   Has home health visited the patient within 72 hours of discharge? N/A   Psychosocial issues? No   Did the patient receive a copy of their discharge instructions? Yes   Nursing interventions Reviewed instructions with patient   What is the patient's perception of their health status since discharge? Improving   Nursing interventions Nurse provided patient education   Is the patient/caregiver able to teach back signs and symptoms of incisional infection? Increased redness, swelling or pain at the incisonal site, Incisional warmth, Fever, Increased drainage or bleeding, Pus or odor from incision   If the patient is a current smoker, are they able to teach back resources for cessation? Smoking cessation medications, Smoking cessation classes, Smoking cessation support groups, 1-275-JfreAga  [Has cut down his smoking.]   Is the patient/caregiver able to teach back the hierarchy of who to call/visit for symptoms/problems? PCP, Specialist, Home health nurse, Urgent Care, ED, 911 Yes   Week 2 call completed? Yes   Wrap up additional comments States  is overall improving, but still has pain in upper right chest where lymph nodes were removed. States only way to get relief is to lay flat. Encouraged to call surgeon for evaluation. Denies any fever or SOA.          JENIFER BARRIGA - Registered Nurse

## 2022-09-06 NOTE — TELEPHONE ENCOUNTER
Is having pain around right breast area and travels across chest, not where the actual surgical incision is.  I advised him probably related to post op surgical process. He does have an appointment next week, is this acceptable?

## 2022-09-13 NOTE — PROGRESS NOTES
[facility] Cardiothoracic Surgery Office Follow Up Note     Date of Encounter: 2022     Name: Jack Mcguire  : 1960     Referred By: No ref. provider found  PCP: Segundo Vo MD    Chief Complaint:    Chief Complaint   Patient presents with   • Post-op     Hospital follow up s/p right VAT's with right middle lobectomy.       Subjective      History of Present Illness:    Jack Mcguire is a 62 y.o. male current smoker, with a history of hyperlipidemia, stroke,, Charcot-Jolene-Tooth disease, and right lung mass s/p bronchoscopy, right VATS, right middle lobectomy, with mediastinal lymph node dissection and intercostal nerve block on 2022 by Dr. Durant.  Patient had uncomplicated postoperative course was discharged on POD #4 with no readmissions.  Pathology revealed acute and chronic bronchiectasis with adjacent and organized pneumonia with granulomatous inflammation, negative for malignancy, negative lymph nodes. Patient presents today for postoperative follow up. Patient reports stable/improving SOA/ BROUSSARD and fatigue. He denies any further weight loss, denies any hemoptysis or lymphadenopathy. Denies any fevers, chills, body aches or concerns with his incisions. He does unfortunately continue to smoke. He has not been seen again by pulmonology postoperatively. Patient reports he has been walking but has not been using his incentive spirometer.     Review of Systems:  Review of Systems   Constitutional: Negative. Negative for chills, decreased appetite, fever and malaise/fatigue.   Cardiovascular: Positive for dyspnea on exertion. Negative for chest pain, claudication, irregular heartbeat, leg swelling, near-syncope, orthopnea, palpitations and syncope.   Respiratory: Positive for cough. Negative for hemoptysis, shortness of breath, sputum production and wheezing.    Hematologic/Lymphatic: Negative for bleeding problem. Bruises/bleeds easily.   Skin: Negative.  Negative for color  change, poor wound healing and rash.   Musculoskeletal: Positive for back pain and joint pain. Negative for falls.   Gastrointestinal: Negative.  Negative for abdominal pain, constipation, diarrhea, nausea and vomiting.   Neurological: Positive for numbness. Negative for focal weakness and paresthesias.   Psychiatric/Behavioral: Negative.  Negative for depression. The patient does not have insomnia.        I have reviewed the following portions of the patient's history: allergies, current medications, past family history, past medical history, past social history, past surgical history, problem list and ROS and confirm it's accurate.    Allergies:  No Known Allergies    Medications:      Current Outpatient Medications:   •  allopurinol (Zyloprim) 100 MG tablet, Take 1 tablet by mouth Daily., Disp: 90 tablet, Rfl: 3  •  aspirin 81 MG tablet, Take 81 mg by mouth Daily., Disp: , Rfl:   •  docusate sodium (COLACE) 100 MG capsule, Take 200 mg by mouth 2 (Two) Times a Day., Disp: , Rfl:   •  levocetirizine (XYZAL) 5 MG tablet, Take 5 mg by mouth Daily., Disp: , Rfl: 0  •  levothyroxine (SYNTHROID, LEVOTHROID) 100 MCG tablet, TAKE 1 TABLET DAILY. PATIENT NEEDS FOLLOW UP APPT FOR FURTHER REFILLS. (Patient taking differently: Take 100 mcg by mouth Daily.), Disp: 90 tablet, Rfl: 3  •  lovastatin (MEVACOR) 20 MG tablet, Take 1 tablet by mouth Every Night., Disp: 90 tablet, Rfl: 3  •  Morphine (MS CONTIN) 30 MG 12 hr tablet, Take 30 mg by mouth 2 (Two) Times a Day., Disp: , Rfl: 0  •  naproxen (NAPROSYN) 375 MG tablet, Take 375 mg by mouth 2 (Two) Times a Day As Needed for Mild Pain ., Disp: , Rfl:   •  omeprazole (priLOSEC) 40 MG capsule, Take 1 capsule by mouth Daily., Disp: 90 capsule, Rfl: 3  •  oxyCODONE (ROXICODONE) 10 MG tablet, Take 10 mg by mouth 2 (Two) Times a Day., Disp: , Rfl:   •  tamsulosin (FLOMAX) 0.4 MG capsule 24 hr capsule, Take 0.4 mg by mouth Daily., Disp: , Rfl:   •  tiZANidine (ZANAFLEX) 4 MG tablet,  Take 1 tablet by mouth Every 6 (Six) Hours As Needed for Muscle Spasms., Disp: 120 tablet, Rfl: 11    History:   Past Medical History:   Diagnosis Date   • Benign hypertension 09/28/2016   • Bradycardia 09/28/2016   • Charcot-Jolene disease    • Chest pain syndrome 09/28/2016   • COPD (chronic obstructive pulmonary disease) (HCC)    • Essential tremor 09/28/2016   • Gastroesophageal reflux disease with hiatal hernia 09/28/2016   • Hypercholesterolemia    • Hypothyroidism    • Lung mass     RIGHT   • Obesity 09/28/2016   • NETTIE on CPAP    • Osteoarthritis 09/28/2016   • Seasonal allergic rhinitis 09/28/2016   • Sick sinus syndrome (HCC) 09/28/2016   • Stage III CKD 8/18/2022   • Stroke ([facility]) 1999    left side peripheral vision loss       Past Surgical History:   Procedure Laterality Date   • ANKLE SURGERY Left    • BRONCHOSCOPY N/A 8/18/2022    Procedure: BRONCHOSCOPY;  Surgeon: Stanton Durant MD;  Location: [facility] OR;  Service: Cardiothoracic;  Laterality: N/A;   • CARDIAC CATHETERIZATION      NO INTERVENTION   • CARPAL TUNNEL RELEASE Bilateral    • COLONOSCOPY      multiple   • HERNIA REPAIR Right     inguinal   • INSERT / REPLACE / REMOVE PACEMAKER  2006    Have had it replaced once   • OTHER SURGICAL HISTORY Bilateral     Elbow surgery   • PACEMAKER IMPLANTATION      [facility]; LAST INTERROGATION 7/20/22   • THORACOSCOPY Right 8/18/2022    Procedure: THORACOSCOPY VIDEO ASSISTED WITH RIGHT MIDDLE LOBECTOMY, MEDIASTINAL LYMPH NODE DISSECTION, INTERCOSTAL NERVE BLOCKS;  Surgeon: Stanton Durant MD;  Location: [facility] OR;  Service: Cardiothoracic;  Laterality: Right;   • TONSILLECTOMY     • TOOTH EXTRACTION         Social History     Socioeconomic History   • Marital status:    • Number of children: 2   Tobacco Use   • Smoking status: Current Every Day Smoker     Packs/day: 0.50     Years: 46.00     Pack years: 23.00     Types: Cigarettes   • Smokeless tobacco: Never Used   Vaping Use   • Vaping Use: Never used  "  Substance and Sexual Activity   • Alcohol use: Yes     Comment: once a year   • Drug use: No   • Sexual activity: Defer        Family History   Problem Relation Age of Onset   • Cancer Mother    • Charcot-Jolene-Tooth disease Mother    • Pneumonia Father    • Lung cancer Father    • Heart attack Neg Hx        Objective     Physical Exam:  Vitals:    09/13/22 1405   BP: 158/94   BP Location: Right arm   Patient Position: Sitting   Pulse: 80   Temp: 97.7 °F (36.5 °C)   SpO2: 99%   Weight: 81.2 kg (179 lb)   Height: 167.6 cm (66\")      Body mass index is 28.89 kg/m².    Physical Exam  Vitals and nursing note reviewed.   Constitutional:       General: He is awake.      Appearance: Normal appearance. He is well-developed.   HENT:      Head: Normocephalic and atraumatic.   Eyes:      Pupils: Pupils are equal, round, and reactive to light.   Neck:      Vascular: No carotid bruit.   Cardiovascular:      Rate and Rhythm: Normal rate and regular rhythm.      Pulses: Normal pulses.      Heart sounds: Normal heart sounds, S1 normal and S2 normal. No murmur heard.  Pulmonary:      Effort: Pulmonary effort is normal.      Breath sounds: No decreased air movement. Decreased breath sounds present.   Chest:   Breasts:      Right: No axillary adenopathy or supraclavicular adenopathy.      Left: No axillary adenopathy or supraclavicular adenopathy.       Abdominal:      Palpations: Abdomen is soft.   Musculoskeletal:         General: No swelling. Normal range of motion.      Cervical back: Normal range of motion and neck supple.      Right lower leg: No edema.      Left lower leg: No edema.   Lymphadenopathy:      Cervical: No cervical adenopathy.      Right cervical: No superficial, deep or posterior cervical adenopathy.     Left cervical: No superficial, deep or posterior cervical adenopathy.      Upper Body:      Right upper body: No supraclavicular or axillary adenopathy.      Left upper body: No supraclavicular or axillary " adenopathy.   Skin:     General: Skin is warm and dry.      Capillary Refill: Capillary refill takes less than 2 seconds.      Findings: No bruising or lesion.      Nails: There is no clubbing.      Comments: Right VATs incision: well healing with wound edges approximated, no surrounding erythema, edema, tenderness or induration on palpation  Chest tube site: closed with no surround erythema, warmth, or tenderness, sutures removed today without issue   Neurological:      General: No focal deficit present.      Mental Status: He is alert and oriented to person, place, and time. Mental status is at baseline.      GCS: GCS eye subscore is 4. GCS verbal subscore is 5. GCS motor subscore is 6.      Cranial Nerves: Cranial nerves are intact.      Sensory: Sensation is intact.      Motor: Motor function is intact.      Coordination: Coordination is intact.      Gait: Gait is intact.   Psychiatric:         Mood and Affect: Mood and affect normal. Mood is not depressed.         Speech: Speech normal.         Behavior: Behavior normal. Behavior is cooperative.         Thought Content: Thought content normal.         Cognition and Memory: Cognition normal.         Judgment: Judgment normal.         Imaging/Labs:    XR Chest 2 View  Result Date: 9/14/2022   1. Increased volume loss in the right base, which may reflect changes of right lower lobe atelectasis. These findings are new in comparison to 08/22/2022. 2. Right middle lobectomy.  This report was finalized on 9/14/2022 8:01 AM by Tiki Roberts MD.        Assessment / Plan      Assessment / Plan:  Diagnoses and all orders for this visit:    1. S/P lobectomy of lung       1. Right lung mass s/p bronchoscopy, right VATS, right middle lobectomy, with mediastinal lymph node dissection and intercostal nerve block on 8/18/2022 by Dr. Durant:   Pathology revealed acute and chronic bronchiectasis with adjacent and organized pneumonia with granulomatous inflammation, negative for  malignancy, negative lymph nodes.Patient reports stable/improving SOA/ BROUSSARD and fatigue. He denies any further weight loss, denies any hemoptysis or lymphadenopathy. Denies any fevers, chills, body aches or concerns with his incisions. He does unfortunately continue to smoke. He has not been seen again by pulmonology postoperatively. Patient reports he has been walking but has not been using his incentive spirometer. Stable CXR today with evidence of right lower lobe atelectasis and postoperative changes. Patient's incisions are well healing and sutures were removed today without issue. Patient is stable from a postop standpoint. Discussed results of pathology with patient. Will Refer back to pulmonology Dr. Taylor/ JUSTIN Wilson for follow up/further surveillance. Will plan to see the patient back on an as needed basis. We are available for any future surgical consultation as needed. Discussed the importance of smoking cessation with patient.     Patient Education:  Jack Mcguire  reports that he has been smoking cigarettes. He has a 23.00 pack-year smoking history. He has never used smokeless tobacco.. I have educated him on the risk of diseases from using tobacco products such as cancer, COPD and heart disease.     I advised him to quit and he is not willing to quit.    I spent 4 minutes counseling the patient.      Follow Up:   Return if symptoms worsen or fail to improve.   Or sooner for any further concerns or worsening sign and symptoms. If unable to reach us in the office please dial 911 or go to the nearest emergency department.      Naomi Taylor APRGWEN  [facility] Cardiothoracic Surgery

## 2022-09-14 NOTE — PROGRESS NOTES
Subjective     Patient ID: Jack Mcguire is a 62 y.o. male. Patient is here for management of multiple medical problems.     Chief Complaint   Patient presents with   • Hypertension     History of Present Illness       Pain at surgical sites.    Hypertension.        The following portions of the patient's history were reviewed and updated as appropriate: allergies, current medications, past family history, past medical history, past social history, past surgical history and problem list.    Review of Systems   Constitutional: Negative for fatigue, fever and unexpected weight change.   Psychiatric/Behavioral: Negative for self-injury and sleep disturbance. The patient is not nervous/anxious.    All other systems reviewed and are negative.      Current Outpatient Medications:   •  allopurinol (Zyloprim) 100 MG tablet, Take 1 tablet by mouth Daily., Disp: 90 tablet, Rfl: 3  •  aspirin 81 MG tablet, Take 81 mg by mouth Daily., Disp: , Rfl:   •  docusate sodium (COLACE) 100 MG capsule, Take 200 mg by mouth 2 (Two) Times a Day., Disp: , Rfl:   •  levocetirizine (XYZAL) 5 MG tablet, Take 5 mg by mouth Daily., Disp: , Rfl: 0  •  levothyroxine (SYNTHROID, LEVOTHROID) 100 MCG tablet, Take 1 tablet by mouth Daily., Disp: 90 tablet, Rfl: 3  •  lidocaine (LIDODERM) 5 %, Place 1 patch on the skin as directed by provider Daily. Remove & Discard patch within 12 hours or as directed by MD, Disp: 30 patch, Rfl: 11  •  lovastatin (MEVACOR) 20 MG tablet, Take 1 tablet by mouth Every Night., Disp: 90 tablet, Rfl: 3  •  Morphine (MS CONTIN) 30 MG 12 hr tablet, Take 30 mg by mouth 2 (Two) Times a Day., Disp: , Rfl: 0  •  naproxen (NAPROSYN) 375 MG tablet, Take 375 mg by mouth 2 (Two) Times a Day As Needed for Mild Pain ., Disp: , Rfl:   •  omeprazole (priLOSEC) 40 MG capsule, Take 1 capsule by mouth Daily., Disp: 90 capsule, Rfl: 3  •  oxyCODONE (ROXICODONE) 10 MG tablet, Take 10 mg by mouth 2 (Two) Times a Day., Disp: , Rfl:   •   "tamsulosin (FLOMAX) 0.4 MG capsule 24 hr capsule, Take 0.4 mg by mouth Daily., Disp: , Rfl:   •  tiZANidine (ZANAFLEX) 4 MG tablet, Take 1 tablet by mouth Every 6 (Six) Hours As Needed for Muscle Spasms., Disp: 120 tablet, Rfl: 11    Objective      Blood pressure 118/80, pulse 60, temperature 98.2 °F (36.8 °C), resp. rate 16, height 167.6 cm (66\"), weight 78.9 kg (174 lb), SpO2 97 %.    Physical Exam     General Appearance:    Alert, cooperative, no distress, appears stated age   Head:    Normocephalic, without obvious abnormality, atraumatic   Eyes:    PERRL, conjunctiva/corneas clear, EOM's intact   Ears:    Normal TM's and external ear canals, both ears   Nose:   Nares normal, septum midline, mucosa normal, no drainage   or sinus tenderness   Throat:   Lips, mucosa, and tongue normal; teeth and gums normal   Neck:   Supple, symmetrical, trachea midline, no adenopathy;        thyroid:  No enlargement/tenderness/nodules; no carotid    bruit or JVD   Back:     Symmetric, no curvature, ROM normal, no CVA tenderness   Lungs:     Clear to auscultation bilaterally, respirations unlabored   Chest wall:    No tenderness or deformity   Heart:    Regular rate and rhythm, S1 and S2 normal, no murmur,        rub or gallop   Abdomen:     Soft, non-tender, bowel sounds active all four quadrants,     no masses, no organomegaly   Extremities:   Extremities normal, atraumatic, no cyanosis or edema   Pulses:   2+ and symmetric all extremities   Skin:   Skin color, texture, turgor normal, no rashes or lesions   Lymph nodes:   Cervical, supraclavicular, and axillary nodes normal   Neurologic:   CNII-XII intact. Normal strength, sensation and reflexes       throughout      Results for orders placed or performed during the hospital encounter of 08/18/22   Basic Metabolic Panel    Specimen: Blood   Result Value Ref Range    Glucose 153 (H) 65 - 99 mg/dL    BUN 23 8 - 23 mg/dL    Creatinine 1.14 0.76 - 1.27 mg/dL    Sodium 134 (L) 136 - " 145 mmol/L    Potassium 4.3 3.5 - 5.2 mmol/L    Chloride 102 98 - 107 mmol/L    CO2 19.0 (L) 22.0 - 29.0 mmol/L    Calcium 8.6 8.6 - 10.5 mg/dL    BUN/Creatinine Ratio 20.2 7.0 - 25.0    Anion Gap 13.0 5.0 - 15.0 mmol/L    eGFR 72.7 >60.0 mL/min/1.73   Magnesium    Specimen: Blood   Result Value Ref Range    Magnesium 1.8 1.6 - 2.4 mg/dL   Phosphorus    Specimen: Blood   Result Value Ref Range    Phosphorus 3.2 2.5 - 4.5 mg/dL   CBC Auto Differential    Specimen: Blood   Result Value Ref Range    WBC 17.56 (H) 3.40 - 10.80 10*3/mm3    RBC 4.33 4.14 - 5.80 10*6/mm3    Hemoglobin 13.2 13.0 - 17.7 g/dL    Hematocrit 38.6 37.5 - 51.0 %    MCV 89.1 79.0 - 97.0 fL    MCH 30.5 26.6 - 33.0 pg    MCHC 34.2 31.5 - 35.7 g/dL    RDW 14.4 12.3 - 15.4 %    RDW-SD 46.7 37.0 - 54.0 fl    MPV 9.9 6.0 - 12.0 fL    Platelets 193 140 - 450 10*3/mm3    Neutrophil % 93.7 (H) 42.7 - 76.0 %    Lymphocyte % 2.2 (L) 19.6 - 45.3 %    Monocyte % 3.5 (L) 5.0 - 12.0 %    Eosinophil % 0.0 (L) 0.3 - 6.2 %    Basophil % 0.2 0.0 - 1.5 %    Immature Grans % 0.4 0.0 - 0.5 %    Neutrophils, Absolute 16.46 (H) 1.70 - 7.00 10*3/mm3    Lymphocytes, Absolute 0.38 (L) 0.70 - 3.10 10*3/mm3    Monocytes, Absolute 0.61 0.10 - 0.90 10*3/mm3    Eosinophils, Absolute 0.00 0.00 - 0.40 10*3/mm3    Basophils, Absolute 0.04 0.00 - 0.20 10*3/mm3    Immature Grans, Absolute 0.07 (H) 0.00 - 0.05 10*3/mm3    nRBC 0.0 0.0 - 0.2 /100 WBC   Basic Metabolic Panel    Specimen: Blood   Result Value Ref Range    Glucose 108 (H) 65 - 99 mg/dL    BUN 18 8 - 23 mg/dL    Creatinine 1.09 0.76 - 1.27 mg/dL    Sodium 141 136 - 145 mmol/L    Potassium 3.5 3.5 - 5.2 mmol/L    Chloride 107 98 - 107 mmol/L    CO2 24.0 22.0 - 29.0 mmol/L    Calcium 8.8 8.6 - 10.5 mg/dL    BUN/Creatinine Ratio 16.5 7.0 - 25.0    Anion Gap 10.0 5.0 - 15.0 mmol/L    eGFR 76.7 >60.0 mL/min/1.73   CBC Auto Differential    Specimen: Blood   Result Value Ref Range    WBC 12.42 (H) 3.40 - 10.80 10*3/mm3    RBC  4.45 4.14 - 5.80 10*6/mm3    Hemoglobin 13.1 13.0 - 17.7 g/dL    Hematocrit 41.4 37.5 - 51.0 %    MCV 93.0 79.0 - 97.0 fL    MCH 29.4 26.6 - 33.0 pg    MCHC 31.6 31.5 - 35.7 g/dL    RDW 14.6 12.3 - 15.4 %    RDW-SD 49.9 37.0 - 54.0 fl    MPV 9.5 6.0 - 12.0 fL    Platelets 187 140 - 450 10*3/mm3    Neutrophil % 76.4 (H) 42.7 - 76.0 %    Lymphocyte % 13.0 (L) 19.6 - 45.3 %    Monocyte % 9.0 5.0 - 12.0 %    Eosinophil % 0.6 0.3 - 6.2 %    Basophil % 0.6 0.0 - 1.5 %    Immature Grans % 0.4 0.0 - 0.5 %    Neutrophils, Absolute 9.48 (H) 1.70 - 7.00 10*3/mm3    Lymphocytes, Absolute 1.62 0.70 - 3.10 10*3/mm3    Monocytes, Absolute 1.12 (H) 0.10 - 0.90 10*3/mm3    Eosinophils, Absolute 0.08 0.00 - 0.40 10*3/mm3    Basophils, Absolute 0.07 0.00 - 0.20 10*3/mm3    Immature Grans, Absolute 0.05 0.00 - 0.05 10*3/mm3    nRBC 0.0 0.0 - 0.2 /100 WBC   Magnesium    Specimen: Blood   Result Value Ref Range    Magnesium 2.1 1.6 - 2.4 mg/dL   Hemoglobin & Hematocrit, Blood    Specimen: Blood   Result Value Ref Range    Hemoglobin 14.4 13.0 - 17.7 g/dL    Hematocrit 42.6 37.5 - 51.0 %   Basic Metabolic Panel    Specimen: Blood   Result Value Ref Range    Glucose 93 65 - 99 mg/dL    BUN 26 (H) 8 - 23 mg/dL    Creatinine 1.35 (H) 0.76 - 1.27 mg/dL    Sodium 139 136 - 145 mmol/L    Potassium 4.2 3.5 - 5.2 mmol/L    Chloride 100 98 - 107 mmol/L    CO2 29.0 22.0 - 29.0 mmol/L    Calcium 8.9 8.6 - 10.5 mg/dL    BUN/Creatinine Ratio 19.3 7.0 - 25.0    Anion Gap 10.0 5.0 - 15.0 mmol/L    eGFR 59.4 (L) >60.0 mL/min/1.73   CBC Auto Differential    Specimen: Blood   Result Value Ref Range    WBC 8.07 3.40 - 10.80 10*3/mm3    RBC 4.45 4.14 - 5.80 10*6/mm3    Hemoglobin 13.4 13.0 - 17.7 g/dL    Hematocrit 40.7 37.5 - 51.0 %    MCV 91.5 79.0 - 97.0 fL    MCH 30.1 26.6 - 33.0 pg    MCHC 32.9 31.5 - 35.7 g/dL    RDW 14.3 12.3 - 15.4 %    RDW-SD 48.0 37.0 - 54.0 fl    MPV 9.8 6.0 - 12.0 fL    Platelets 183 140 - 450 10*3/mm3    Neutrophil % 62.7 42.7 -  76.0 %    Lymphocyte % 21.9 19.6 - 45.3 %    Monocyte % 10.5 5.0 - 12.0 %    Eosinophil % 4.1 0.3 - 6.2 %    Basophil % 0.6 0.0 - 1.5 %    Immature Grans % 0.2 0.0 - 0.5 %    Neutrophils, Absolute 5.05 1.70 - 7.00 10*3/mm3    Lymphocytes, Absolute 1.77 0.70 - 3.10 10*3/mm3    Monocytes, Absolute 0.85 0.10 - 0.90 10*3/mm3    Eosinophils, Absolute 0.33 0.00 - 0.40 10*3/mm3    Basophils, Absolute 0.05 0.00 - 0.20 10*3/mm3    Immature Grans, Absolute 0.02 0.00 - 0.05 10*3/mm3    nRBC 0.0 0.0 - 0.2 /100 WBC   ECG 12 Lead   Result Value Ref Range    QT Interval 362 ms    QTC Interval 459 ms   ABO/Rh Specimen Verification    Specimen: Blood   Result Value Ref Range    ABO Type O     RH type Positive    Prepare RBC, 2 Units   Result Value Ref Range    Product Code J0241C53     Unit Number [id number]     UNIT  ABO O     UNIT  RH POS     Crossmatch Interpretation Compatible     Dispense Status RE     Blood Expiration Date 202209212359     Blood Type Barcode 5100     Product Code T1381L07     Unit Number [id number]     UNIT  ABO O     UNIT  RH POS     Crossmatch Interpretation Compatible     Dispense Status RE     Blood Expiration Date 202209212359     Blood Type Barcode 5100    Tissue Pathology Exam    Specimen: A: Lung, Right Middle Lobe; Tissue    B: Lymph Node; Tissue    C: Lymph Node; Tissue    D: Lymph Node; Tissue    E: Lymph Node; Tissue    F: Lymph Node; Tissue   Result Value Ref Range    Case Report       Surgical Pathology Report                         Case: [id number]                                  Authorizing Provider:  Stanton Durant MD         Collected:           08/18/2022 04:37 PM          Ordering Location:     [facility]   Received:            08/19/2022 06:25 AM                                 OR                                                                           Pathologist:           Minesh Mcfarland MD                                                     Specimens:    1) - Lung, Right Middle Lobe, right middle lobectomy                                                2) - Lymph Node, RIGHT LUNG STATION 9                                                               3) - Lymph Node, RIGHT LUNG STATION 11                                                              4) - Lymph Node, right lung 2R                                                                      5) - Lymph Node, RIGHT LUNG 4R                                                                       6) - Lymph Node, RIGHT LUNG STATION 7                                                      Clinical Information       Lung mass      Final Diagnosis       1.  RIGHT LUNG, MIDDLE LOBE, LOBECTOMY:  Acute and chronic bronchiectasis with adjacent organizing and organized pneumonia and granulomatous inflammation  Negative for malignancy  Three benign lymph nodes (0/3)  Acid-fast bacilli and fungal stains negative    2. LYMPH NODE, STATION 9, EXCISION:  One benign lymph node old hyalinized granuloma (0/1)  Rare fungal organisms compatible with histoplasma identified by fungal stain  Acid-fast bacilli stain negative    3. LYMPH NODE, STATION 11, EXCISION:  One benign lymph node (0/1)    4. LYMPH NODE, STATION 2R, EXCISION:  One benign lymph node (0/1)    5. LYMPH NODE, STATION 4R, EXCISION:  One benign lymph node (0/1)    6. LYMPH NODE, STATION 7, EXCISION:  One benign lymph node with old hyalinized granulomas (0/1)  Numerous fungal organisms compatible with histoplasma identified by fungal stain  Acid-fast bacilli stain negative        Intraoperative Consultation       Frozen section: Verbal report given to Dr. Durant via speakerphone on 8/18/2022 at 18:34 EDT.    FROZEN SECTION DIAGNOSIS:   -Favor non-small cell carcinoma  -Bronchial, vascular and parenchymal margins negative for tumor  (FGG)  Stains used for Immediate Evaluation are acceptable.              Gross Description       1. Lung, Right Middle Lobe.  Received fresh for  frozen section labeled right middle lobectomy is a 79.5 g, 13.5 x 8 x 3.5 cm intact lung lobe with red-purple, focally shaggy pleura with moderate anthracotic pigment.  There is a single air-filled bullous, 3.5 x 3 x 2.5 cm.  Sectioning from superior to inferior reveals a 3.5 x 2.6 x 2.2 cm ill-defined tan firm mass which abuts the overlying pleura (inked blue), is 1.4 cm from the bronchial margin, 1.2 cm from the vascular margin and 1.6 cm from the stapled parenchymal margin.  No involvement of the bronchus or pulmonary vasculature is identified.  The remaining lung parenchyma is red-pink and spongy with minimal emphysematous change.  The bronchi are patent with minimal mucus and are lined by striated epithelium.  There are a few questionable anthracotic peribronchial lymph nodes measuring up to 0.3 cm in greatest dimension.  Representative sections are submitted for frozen section now resubmitted in blocks 1 A- 1C.  Additional sections are submitted as follows: 1D-1G-mass with overlying pleura in block 1D; 1H lung parenchyma with bullous; 1I-intact peribronchial nodes.    2. Lymph Node.  Received in formalin labeled right lung station 9 is a 0.5 x 0.5 x 0.3 cm anthracotic lymph submitted in toto in block 2A.    3. Lymph Node.  Received in formalin labeled right lung station 11 are multiple pieces of black anthracotic lymph node aggregating 2 x 1.2 x 0.5 cm submitted entirely in block 3A.    4. Lymph Node.  Received in formalin labeled right lung 2R lymph node are multiple tan-pink lymph node fragments and yellow lobular adipose tissue aggregating 3.5 x 2 x 0.6 cm submitted entirely in block 4A.    5. Lymph Node.  Received in formalin labeled right lung 4R is a 3 x 3 x 0.6 cm aggregate of tan-pink lymph node fragments submitted entirely in blocks 5A-5B.    6. Lymph Node.  Received in formalin labeled right lung station 7 lymph node are multiple fragments of black anthracotic lymph node and adipose tissue  aggregating 2.8 x 2.5 x 0.6 cm submitted entirely in block 6A. HDM        Microscopic Description       Sections of the first specimen labeled right middle lobe show the ill-defined nodular area to show collapsed consolidated lung with fibrotic and lobulated fibromyxoid stroma with entrapped irregular angulated branching and budding alveoli lined by highly reactive pneumocytes there is an associated patchy variable acute and chronic inflammatory cell infiltrate including scattered histiocytes and multi nucleated histiocytes.  There are also entrapped dilated ectatic bronchi serafin lined by a marked acute and chronic inflammatory cell infiltrate with frequent multinucleate histiocytes.  The adjacent lung shows alveolar septae with variably thickened fibrotic walls lined by similar appearing highly reactive pneumocytes.  There again lobulated fibromyxoid plugs.  The alveolar lumens also show frequent pigmented macrophages.  The uninvolved lung shows mild fibrosis of the alveolar septae with free-floating fragments of septal walls and scattered bullae.  There are 3 hilar nodes identified.    Sections of the second specimen labeled station 9 lymph node shows a single lymph node with a small hyalinized nodule compatible with an old hyalinized granuloma.  No tumor or atypia is seen (0/1).  Special stain for acid-fast bacilli is negative with adequate control.  A fungal stain (GMS) show rare organisms compatible with histoplasma with adequate control.    Sections of the third specimen labeled station 11 lymph node show multiple fragments of lymph node showing scattered reactive germinal centers.  No tumor or atypia is seen (0/1)    Sections of the fourth specimen labeled station 2R lymph node show multiple fragments of lymph node with scattered reactive germinal centers.  No tumor or atypia is seen (0/1).    Sections of the fifth specimen labeled station 4R lymph node show multiple fragments of lymph node showing scattered  follicles showing reactive germinal centers with tingible body macrophages.  No tumor or atypia is seen (0/1).    Sections of the sixth specimen labeled station 7 lymph node show a lymph node with scattered follicles showing germinal centers with tingible body macrophages.  There are also scattered lobulated hyalinized nodule with central degeneration and focal calcifications compatible with old hyalinized granulomas.  No tumor or atypia is seen (0/1).  A special stain for acid-fast bacilli is negative with adequate control.  A fungal stain (GMS) show numerous organisms compatible with histoplasma with adequate control.           Assessment & Plan   Pt told bx all normal.    Still smoking.    Acute renal insuff. Only drinks soda. No water.          Diagnoses and all orders for this visit:    1. Tobacco abuse (Primary)  -     Lipid Panel  -     CBC & Differential  -     Vitamin B12  -     Comprehensive Metabolic Panel  -     TSH  -     T4, Free    2. Hypercholesteremia  -     lovastatin (MEVACOR) 20 MG tablet; Take 1 tablet by mouth Every Night.  Dispense: 90 tablet; Refill: 3  -     Lipid Panel  -     CBC & Differential  -     Vitamin B12  -     Comprehensive Metabolic Panel  -     TSH  -     T4, Free    3. Acute renal insufficiency  -     Lipid Panel  -     CBC & Differential  -     Vitamin B12  -     Comprehensive Metabolic Panel  -     TSH  -     T4, Free    4. Acquired hypothyroidism  -     levothyroxine (SYNTHROID, LEVOTHROID) 100 MCG tablet; Take 1 tablet by mouth Daily.  Dispense: 90 tablet; Refill: 3  -     Lipid Panel  -     CBC & Differential  -     Vitamin B12  -     Comprehensive Metabolic Panel  -     TSH  -     T4, Free    5. Pain at surgical site  -     Discontinue: lidocaine (LIDODERM) 5 %; Place 1 patch on the skin as directed by provider Daily. Remove & Discard patch within 12 hours or as directed by MD  Dispense: 30 patch; Refill: 11  -     lidocaine (LIDODERM) 5 %; Place 1 patch on the skin as  directed by provider Daily. Remove & Discard patch within 12 hours or as directed by MD  Dispense: 30 patch; Refill: 11  -     Lipid Panel  -     CBC & Differential  -     Vitamin B12  -     Comprehensive Metabolic Panel  -     TSH  -     T4, Free      Return in about 6 months (around 3/14/2023).          There are no Patient Instructions on file for this visit.     Segundo Vo MD    Assessment & Plan
